# Patient Record
Sex: MALE | Race: BLACK OR AFRICAN AMERICAN | Employment: OTHER
[De-identification: names, ages, dates, MRNs, and addresses within clinical notes are randomized per-mention and may not be internally consistent; named-entity substitution may affect disease eponyms.]

---

## 2017-01-27 ENCOUNTER — TELEPHONE (OUTPATIENT)
Dept: FAMILY MEDICINE CLINIC | Facility: CLINIC | Age: 37
End: 2017-01-27

## 2017-06-01 ENCOUNTER — HOSPITAL ENCOUNTER (OUTPATIENT)
Age: 37
Setting detail: SPECIMEN
Discharge: HOME | End: 2017-06-01
Payer: MEDICARE

## 2017-06-01 ENCOUNTER — OFFICE VISIT (OUTPATIENT)
Dept: FAMILY MEDICINE CLINIC | Age: 37
End: 2017-06-01
Payer: MEDICARE

## 2017-06-01 VITALS
HEIGHT: 67 IN | DIASTOLIC BLOOD PRESSURE: 98 MMHG | BODY MASS INDEX: 45.01 KG/M2 | SYSTOLIC BLOOD PRESSURE: 147 MMHG | WEIGHT: 286.8 LBS | TEMPERATURE: 98.2 F | HEART RATE: 77 BPM

## 2017-06-01 DIAGNOSIS — E11.9 TYPE 2 DIABETES MELLITUS WITHOUT COMPLICATION, WITHOUT LONG-TERM CURRENT USE OF INSULIN (HCC): ICD-10-CM

## 2017-06-01 DIAGNOSIS — I10 ESSENTIAL HYPERTENSION: ICD-10-CM

## 2017-06-01 DIAGNOSIS — L98.9 SCALP LESION: Primary | ICD-10-CM

## 2017-06-01 LAB
CHOLESTEROL/HDL RATIO: 2.7
CHOLESTEROL: 150 MG/DL
CREATININE URINE POCT: 300
HBA1C MFR BLD: 8 %
HDLC SERPL-MCNC: 56 MG/DL
LDL CHOLESTEROL: 70 MG/DL (ref 0–130)
MICROALBUMIN/CREAT 24H UR: 80 MG/G{CREAT}
MICROALBUMIN/CREAT UR-RTO: ABNORMAL
TRIGL SERPL-MCNC: 122 MG/DL
VLDLC SERPL CALC-MCNC: NORMAL MG/DL (ref 1–30)

## 2017-06-01 PROCEDURE — G8427 DOCREV CUR MEDS BY ELIG CLIN: HCPCS | Performed by: FAMILY MEDICINE

## 2017-06-01 PROCEDURE — 1036F TOBACCO NON-USER: CPT | Performed by: FAMILY MEDICINE

## 2017-06-01 PROCEDURE — 99213 OFFICE O/P EST LOW 20 MIN: CPT | Performed by: FAMILY MEDICINE

## 2017-06-01 PROCEDURE — 82043 UR ALBUMIN QUANTITATIVE: CPT | Performed by: FAMILY MEDICINE

## 2017-06-01 PROCEDURE — 3045F PR MOST RECENT HEMOGLOBIN A1C LEVEL 7.0-9.0%: CPT | Performed by: FAMILY MEDICINE

## 2017-06-01 PROCEDURE — G8417 CALC BMI ABV UP PARAM F/U: HCPCS | Performed by: FAMILY MEDICINE

## 2017-06-01 PROCEDURE — 82044 UR ALBUMIN SEMIQUANTITATIVE: CPT | Performed by: FAMILY MEDICINE

## 2017-06-01 PROCEDURE — 83036 HEMOGLOBIN GLYCOSYLATED A1C: CPT | Performed by: FAMILY MEDICINE

## 2017-06-01 RX ORDER — FUROSEMIDE 20 MG/1
1 TABLET ORAL DAILY
COMMUNITY
Start: 2017-05-25 | End: 2017-06-01

## 2017-06-01 RX ORDER — LISINOPRIL AND HYDROCHLOROTHIAZIDE 12.5; 1 MG/1; MG/1
TABLET ORAL
Qty: 30 TABLET | Refills: 3 | Status: SHIPPED | OUTPATIENT
Start: 2017-06-01 | End: 2017-07-13 | Stop reason: DRUGHIGH

## 2017-06-01 RX ORDER — GLIMEPIRIDE 4 MG/1
TABLET ORAL
Qty: 30 TABLET | Refills: 2 | Status: SHIPPED | OUTPATIENT
Start: 2017-06-01 | End: 2017-07-13 | Stop reason: DRUGHIGH

## 2017-06-01 ASSESSMENT — ENCOUNTER SYMPTOMS
ROS SKIN COMMENTS: SCALP LESION
ABDOMINAL PAIN: 0
COLOR CHANGE: 0
SHORTNESS OF BREATH: 0

## 2017-07-13 ENCOUNTER — OFFICE VISIT (OUTPATIENT)
Dept: FAMILY MEDICINE CLINIC | Age: 37
End: 2017-07-13
Payer: MEDICARE

## 2017-07-13 VITALS
TEMPERATURE: 97.7 F | DIASTOLIC BLOOD PRESSURE: 100 MMHG | HEART RATE: 90 BPM | WEIGHT: 289.6 LBS | HEIGHT: 70 IN | SYSTOLIC BLOOD PRESSURE: 149 MMHG | BODY MASS INDEX: 41.46 KG/M2

## 2017-07-13 DIAGNOSIS — E11.9 TYPE 2 DIABETES MELLITUS WITHOUT COMPLICATION, WITHOUT LONG-TERM CURRENT USE OF INSULIN (HCC): Primary | ICD-10-CM

## 2017-07-13 DIAGNOSIS — L98.9 SCALP LESION: ICD-10-CM

## 2017-07-13 DIAGNOSIS — E66.01 MORBID OBESITY, UNSPECIFIED OBESITY TYPE (HCC): ICD-10-CM

## 2017-07-13 DIAGNOSIS — I10 ESSENTIAL HYPERTENSION: ICD-10-CM

## 2017-07-13 PROCEDURE — 99213 OFFICE O/P EST LOW 20 MIN: CPT | Performed by: FAMILY MEDICINE

## 2017-07-13 PROCEDURE — 99214 OFFICE O/P EST MOD 30 MIN: CPT

## 2017-07-13 PROCEDURE — G8427 DOCREV CUR MEDS BY ELIG CLIN: HCPCS | Performed by: FAMILY MEDICINE

## 2017-07-13 PROCEDURE — G8417 CALC BMI ABV UP PARAM F/U: HCPCS | Performed by: FAMILY MEDICINE

## 2017-07-13 PROCEDURE — 1036F TOBACCO NON-USER: CPT | Performed by: FAMILY MEDICINE

## 2017-07-13 PROCEDURE — 3046F HEMOGLOBIN A1C LEVEL >9.0%: CPT | Performed by: FAMILY MEDICINE

## 2017-07-13 RX ORDER — LISINOPRIL AND HYDROCHLOROTHIAZIDE 25; 20 MG/1; MG/1
1 TABLET ORAL DAILY
Qty: 30 TABLET | Refills: 3 | Status: SHIPPED | OUTPATIENT
Start: 2017-07-13 | End: 2018-05-11 | Stop reason: SDUPTHER

## 2017-07-13 RX ORDER — GLUCOSAMINE HCL/CHONDROITIN SU 500-400 MG
1 CAPSULE ORAL 3 TIMES DAILY
Qty: 30 STRIP | Refills: 3 | Status: SHIPPED | OUTPATIENT
Start: 2017-07-13 | End: 2017-11-20 | Stop reason: ALTCHOICE

## 2017-07-13 RX ORDER — BLOOD-GLUCOSE METER
KIT MISCELLANEOUS
Qty: 1 KIT | Refills: 0 | Status: SHIPPED | OUTPATIENT
Start: 2017-07-13 | End: 2017-11-20 | Stop reason: ALTCHOICE

## 2017-07-13 RX ORDER — LISINOPRIL AND HYDROCHLOROTHIAZIDE 20; 12.5 MG/1; MG/1
1 TABLET ORAL DAILY
Qty: 30 TABLET | Refills: 3 | Status: SHIPPED | OUTPATIENT
Start: 2017-07-13 | End: 2017-07-13 | Stop reason: DRUGHIGH

## 2017-07-13 RX ORDER — GLIMEPIRIDE 4 MG/1
8 TABLET ORAL EVERY MORNING
Qty: 30 TABLET | Refills: 3 | Status: SHIPPED | OUTPATIENT
Start: 2017-07-13 | End: 2018-05-11 | Stop reason: SDUPTHER

## 2017-07-13 ASSESSMENT — ENCOUNTER SYMPTOMS
ROS SKIN COMMENTS: SCALP LESION
VOMITING: 0
TROUBLE SWALLOWING: 0
ABDOMINAL PAIN: 0
DIARRHEA: 0
COUGH: 0
NAUSEA: 0
CONSTIPATION: 0
SHORTNESS OF BREATH: 0

## 2017-07-19 ENCOUNTER — INITIAL CONSULT (OUTPATIENT)
Dept: SURGERY | Age: 37
End: 2017-07-19
Payer: MEDICARE

## 2017-07-19 VITALS
HEIGHT: 70 IN | DIASTOLIC BLOOD PRESSURE: 86 MMHG | TEMPERATURE: 97.4 F | WEIGHT: 288.4 LBS | SYSTOLIC BLOOD PRESSURE: 133 MMHG | BODY MASS INDEX: 41.29 KG/M2 | HEART RATE: 81 BPM

## 2017-07-19 DIAGNOSIS — L98.9 SCALP LESION: Primary | ICD-10-CM

## 2017-07-19 PROCEDURE — 99214 OFFICE O/P EST MOD 30 MIN: CPT

## 2017-07-19 PROCEDURE — 99203 OFFICE O/P NEW LOW 30 MIN: CPT | Performed by: PODIATRIST

## 2017-08-02 ENCOUNTER — TELEPHONE (OUTPATIENT)
Dept: SURGERY | Age: 37
End: 2017-08-02

## 2017-08-07 ENCOUNTER — TELEPHONE (OUTPATIENT)
Dept: SURGERY | Age: 37
End: 2017-08-07

## 2017-08-09 ENCOUNTER — TELEPHONE (OUTPATIENT)
Dept: SURGERY | Age: 37
End: 2017-08-09

## 2017-08-15 ENCOUNTER — OFFICE VISIT (OUTPATIENT)
Dept: FAMILY MEDICINE CLINIC | Age: 37
End: 2017-08-15
Payer: MEDICARE

## 2017-08-15 VITALS
BODY MASS INDEX: 45.2 KG/M2 | HEIGHT: 67 IN | WEIGHT: 288 LBS | TEMPERATURE: 98 F | SYSTOLIC BLOOD PRESSURE: 132 MMHG | HEART RATE: 88 BPM | DIASTOLIC BLOOD PRESSURE: 74 MMHG

## 2017-08-15 DIAGNOSIS — E66.01 MORBID OBESITY, UNSPECIFIED OBESITY TYPE (HCC): ICD-10-CM

## 2017-08-15 DIAGNOSIS — E11.9 TYPE 2 DIABETES MELLITUS WITHOUT COMPLICATION, WITHOUT LONG-TERM CURRENT USE OF INSULIN (HCC): Primary | ICD-10-CM

## 2017-08-15 DIAGNOSIS — N52.9 ERECTILE DYSFUNCTION, UNSPECIFIED ERECTILE DYSFUNCTION TYPE: ICD-10-CM

## 2017-08-15 DIAGNOSIS — I10 ESSENTIAL HYPERTENSION: ICD-10-CM

## 2017-08-15 DIAGNOSIS — Z00.00 HEALTHCARE MAINTENANCE: ICD-10-CM

## 2017-08-15 PROCEDURE — 1036F TOBACCO NON-USER: CPT | Performed by: FAMILY MEDICINE

## 2017-08-15 PROCEDURE — 3046F HEMOGLOBIN A1C LEVEL >9.0%: CPT | Performed by: FAMILY MEDICINE

## 2017-08-15 PROCEDURE — 90715 TDAP VACCINE 7 YRS/> IM: CPT | Performed by: FAMILY MEDICINE

## 2017-08-15 PROCEDURE — G8427 DOCREV CUR MEDS BY ELIG CLIN: HCPCS | Performed by: FAMILY MEDICINE

## 2017-08-15 PROCEDURE — 99214 OFFICE O/P EST MOD 30 MIN: CPT | Performed by: FAMILY MEDICINE

## 2017-08-15 PROCEDURE — G8417 CALC BMI ABV UP PARAM F/U: HCPCS | Performed by: FAMILY MEDICINE

## 2017-08-15 PROCEDURE — 99213 OFFICE O/P EST LOW 20 MIN: CPT

## 2017-08-15 RX ORDER — SILDENAFIL 50 MG/1
50 TABLET, FILM COATED ORAL PRN
Qty: 10 TABLET | Refills: 0 | Status: SHIPPED | OUTPATIENT
Start: 2017-08-15 | End: 2017-11-20 | Stop reason: ALTCHOICE

## 2017-08-15 ASSESSMENT — ENCOUNTER SYMPTOMS
SHORTNESS OF BREATH: 0
COLOR CHANGE: 0
COUGH: 0
ABDOMINAL PAIN: 0
NAUSEA: 0
VOMITING: 0
CONSTIPATION: 0
DIARRHEA: 0
TROUBLE SWALLOWING: 0

## 2017-08-21 ENCOUNTER — ANESTHESIA EVENT (OUTPATIENT)
Dept: OPERATING ROOM | Age: 37
End: 2017-08-21
Payer: MEDICARE

## 2017-08-22 ENCOUNTER — ANESTHESIA (OUTPATIENT)
Dept: OPERATING ROOM | Age: 37
End: 2017-08-22
Payer: MEDICARE

## 2017-08-22 ENCOUNTER — HOSPITAL ENCOUNTER (OUTPATIENT)
Age: 37
Setting detail: OUTPATIENT SURGERY
Discharge: HOME | End: 2017-08-22
Attending: SURGERY | Admitting: SURGERY
Payer: MEDICARE

## 2017-08-22 VITALS
WEIGHT: 292.77 LBS | RESPIRATION RATE: 14 BRPM | SYSTOLIC BLOOD PRESSURE: 127 MMHG | DIASTOLIC BLOOD PRESSURE: 85 MMHG | OXYGEN SATURATION: 99 % | HEIGHT: 67 IN | TEMPERATURE: 97 F | HEART RATE: 80 BPM | BODY MASS INDEX: 45.95 KG/M2

## 2017-08-22 VITALS — SYSTOLIC BLOOD PRESSURE: 128 MMHG | OXYGEN SATURATION: 95 % | DIASTOLIC BLOOD PRESSURE: 93 MMHG

## 2017-08-22 LAB
GFR NON-AFRICAN AMERICAN: >60 ML/MIN
GFR SERPL CREATININE-BSD FRML MDRD: >60 ML/MIN
GFR SERPL CREATININE-BSD FRML MDRD: NORMAL ML/MIN/{1.73_M2}
GLUCOSE BLD-MCNC: 181 MG/DL (ref 75–110)
GLUCOSE BLD-MCNC: 254 MG/DL (ref 74–100)
POC CHLORIDE: 103 MMOL/L (ref 98–107)
POC CREATININE: 0.87 MG/DL (ref 0.51–1.19)
POC HEMATOCRIT: 49 % (ref 41–53)
POC HEMOGLOBIN: 16.6 G/DL (ref 13.5–17.5)
POC IONIZED CALCIUM: 1.17 MMOL/L (ref 1.15–1.33)
POC POTASSIUM: 4.6 MMOL/L (ref 3.5–4.5)
POC SODIUM: 140 MMOL/L (ref 138–146)

## 2017-08-22 PROCEDURE — 85014 HEMATOCRIT: CPT

## 2017-08-22 PROCEDURE — 84132 ASSAY OF SERUM POTASSIUM: CPT

## 2017-08-22 PROCEDURE — 3700000000 HC ANESTHESIA ATTENDED CARE: Performed by: SURGERY

## 2017-08-22 PROCEDURE — 2580000003 HC RX 258: Performed by: ANESTHESIOLOGY

## 2017-08-22 PROCEDURE — 7100000010 HC PHASE II RECOVERY - FIRST 15 MIN: Performed by: SURGERY

## 2017-08-22 PROCEDURE — 2500000003 HC RX 250 WO HCPCS: Performed by: ANESTHESIOLOGY

## 2017-08-22 PROCEDURE — 3700000001 HC ADD 15 MINUTES (ANESTHESIA): Performed by: SURGERY

## 2017-08-22 PROCEDURE — 3600000002 HC SURGERY LEVEL 2 BASE: Performed by: SURGERY

## 2017-08-22 PROCEDURE — A4364 ADHESIVE, LIQUID OR EQUAL: HCPCS | Performed by: SURGERY

## 2017-08-22 PROCEDURE — 82435 ASSAY OF BLOOD CHLORIDE: CPT

## 2017-08-22 PROCEDURE — 93005 ELECTROCARDIOGRAM TRACING: CPT

## 2017-08-22 PROCEDURE — 2500000003 HC RX 250 WO HCPCS: Performed by: NURSE ANESTHETIST, CERTIFIED REGISTERED

## 2017-08-22 PROCEDURE — 3600000012 HC SURGERY LEVEL 2 ADDTL 15MIN: Performed by: SURGERY

## 2017-08-22 PROCEDURE — 82947 ASSAY GLUCOSE BLOOD QUANT: CPT

## 2017-08-22 PROCEDURE — 82330 ASSAY OF CALCIUM: CPT

## 2017-08-22 PROCEDURE — 2580000003 HC RX 258: Performed by: SURGERY

## 2017-08-22 PROCEDURE — 82565 ASSAY OF CREATININE: CPT

## 2017-08-22 PROCEDURE — 2500000003 HC RX 250 WO HCPCS: Performed by: SURGERY

## 2017-08-22 PROCEDURE — 84295 ASSAY OF SERUM SODIUM: CPT

## 2017-08-22 PROCEDURE — 7100000011 HC PHASE II RECOVERY - ADDTL 15 MIN: Performed by: SURGERY

## 2017-08-22 PROCEDURE — 88305 TISSUE EXAM BY PATHOLOGIST: CPT

## 2017-08-22 PROCEDURE — 6360000002 HC RX W HCPCS: Performed by: NURSE ANESTHETIST, CERTIFIED REGISTERED

## 2017-08-22 RX ORDER — SODIUM CHLORIDE 0.9 % (FLUSH) 0.9 %
10 SYRINGE (ML) INJECTION PRN
Status: DISCONTINUED | OUTPATIENT
Start: 2017-08-22 | End: 2017-08-22 | Stop reason: HOSPADM

## 2017-08-22 RX ORDER — DIPHENHYDRAMINE HYDROCHLORIDE 50 MG/ML
12.5 INJECTION INTRAMUSCULAR; INTRAVENOUS
Status: DISCONTINUED | OUTPATIENT
Start: 2017-08-22 | End: 2017-08-22 | Stop reason: HOSPADM

## 2017-08-22 RX ORDER — SODIUM CHLORIDE, SODIUM LACTATE, POTASSIUM CHLORIDE, CALCIUM CHLORIDE 600; 310; 30; 20 MG/100ML; MG/100ML; MG/100ML; MG/100ML
INJECTION, SOLUTION INTRAVENOUS CONTINUOUS
Status: DISCONTINUED | OUTPATIENT
Start: 2017-08-22 | End: 2017-08-22 | Stop reason: HOSPADM

## 2017-08-22 RX ORDER — SODIUM CHLORIDE 0.9 % (FLUSH) 0.9 %
10 SYRINGE (ML) INJECTION EVERY 12 HOURS SCHEDULED
Status: DISCONTINUED | OUTPATIENT
Start: 2017-08-22 | End: 2017-08-22 | Stop reason: HOSPADM

## 2017-08-22 RX ORDER — LIDOCAINE HYDROCHLORIDE 10 MG/ML
INJECTION, SOLUTION EPIDURAL; INFILTRATION; INTRACAUDAL; PERINEURAL PRN
Status: DISCONTINUED | OUTPATIENT
Start: 2017-08-22 | End: 2017-08-22 | Stop reason: SDUPTHER

## 2017-08-22 RX ORDER — BUPIVACAINE HYDROCHLORIDE 2.5 MG/ML
INJECTION, SOLUTION EPIDURAL; INFILTRATION; INTRACAUDAL PRN
Status: DISCONTINUED | OUTPATIENT
Start: 2017-08-22 | End: 2017-08-22 | Stop reason: HOSPADM

## 2017-08-22 RX ORDER — PROMETHAZINE HYDROCHLORIDE 25 MG/ML
6.25 INJECTION, SOLUTION INTRAMUSCULAR; INTRAVENOUS
Status: DISCONTINUED | OUTPATIENT
Start: 2017-08-22 | End: 2017-08-22 | Stop reason: HOSPADM

## 2017-08-22 RX ORDER — IBUPROFEN 800 MG/1
800 TABLET ORAL EVERY 6 HOURS PRN
Qty: 40 TABLET | Refills: 0 | Status: SHIPPED | OUTPATIENT
Start: 2017-08-22 | End: 2017-11-20 | Stop reason: SDUPTHER

## 2017-08-22 RX ORDER — PROPOFOL 10 MG/ML
INJECTION, EMULSION INTRAVENOUS PRN
Status: DISCONTINUED | OUTPATIENT
Start: 2017-08-22 | End: 2017-08-22 | Stop reason: SDUPTHER

## 2017-08-22 RX ORDER — MAGNESIUM HYDROXIDE 1200 MG/15ML
LIQUID ORAL CONTINUOUS PRN
Status: DISCONTINUED | OUTPATIENT
Start: 2017-08-22 | End: 2017-08-22 | Stop reason: HOSPADM

## 2017-08-22 RX ORDER — ONDANSETRON 2 MG/ML
4 INJECTION INTRAMUSCULAR; INTRAVENOUS
Status: DISCONTINUED | OUTPATIENT
Start: 2017-08-22 | End: 2017-08-22 | Stop reason: HOSPADM

## 2017-08-22 RX ORDER — FENTANYL CITRATE 50 UG/ML
INJECTION, SOLUTION INTRAMUSCULAR; INTRAVENOUS PRN
Status: DISCONTINUED | OUTPATIENT
Start: 2017-08-22 | End: 2017-08-22 | Stop reason: SDUPTHER

## 2017-08-22 RX ORDER — MIDAZOLAM HYDROCHLORIDE 1 MG/ML
INJECTION INTRAMUSCULAR; INTRAVENOUS PRN
Status: DISCONTINUED | OUTPATIENT
Start: 2017-08-22 | End: 2017-08-22 | Stop reason: SDUPTHER

## 2017-08-22 RX ORDER — LIDOCAINE HYDROCHLORIDE 10 MG/ML
1 INJECTION, SOLUTION EPIDURAL; INFILTRATION; INTRACAUDAL; PERINEURAL
Status: COMPLETED | OUTPATIENT
Start: 2017-08-22 | End: 2017-08-22

## 2017-08-22 RX ADMIN — PROPOFOL 20 MG: 10 INJECTION, EMULSION INTRAVENOUS at 13:32

## 2017-08-22 RX ADMIN — PROPOFOL 20 MG: 10 INJECTION, EMULSION INTRAVENOUS at 13:41

## 2017-08-22 RX ADMIN — MIDAZOLAM HYDROCHLORIDE 2 MG: 1 INJECTION, SOLUTION INTRAMUSCULAR; INTRAVENOUS at 13:15

## 2017-08-22 RX ADMIN — SODIUM CHLORIDE, POTASSIUM CHLORIDE, SODIUM LACTATE AND CALCIUM CHLORIDE: 600; 310; 30; 20 INJECTION, SOLUTION INTRAVENOUS at 11:55

## 2017-08-22 RX ADMIN — LIDOCAINE HYDROCHLORIDE 0.4 ML: 10 INJECTION, SOLUTION INFILTRATION; PERINEURAL at 11:44

## 2017-08-22 RX ADMIN — LIDOCAINE HYDROCHLORIDE 50 MG: 10 INJECTION, SOLUTION EPIDURAL; INFILTRATION; INTRACAUDAL; PERINEURAL at 13:25

## 2017-08-22 RX ADMIN — PROPOFOL 20 MG: 10 INJECTION, EMULSION INTRAVENOUS at 13:55

## 2017-08-22 RX ADMIN — FENTANYL CITRATE 50 MCG: 50 INJECTION INTRAMUSCULAR; INTRAVENOUS at 13:27

## 2017-08-22 RX ADMIN — PROPOFOL 20 MG: 10 INJECTION, EMULSION INTRAVENOUS at 13:34

## 2017-08-22 RX ADMIN — PROPOFOL 20 MG: 10 INJECTION, EMULSION INTRAVENOUS at 13:30

## 2017-08-22 RX ADMIN — PROPOFOL 20 MG: 10 INJECTION, EMULSION INTRAVENOUS at 14:00

## 2017-08-22 ASSESSMENT — PAIN SCALES - GENERAL
PAINLEVEL_OUTOF10: 0

## 2017-08-22 ASSESSMENT — PAIN - FUNCTIONAL ASSESSMENT: PAIN_FUNCTIONAL_ASSESSMENT: 0-10

## 2017-08-22 NOTE — BRIEF OP NOTE
Brief Postoperative Note  ______________________________________________________________    Patient: Geovany Iyer  YOB: 1980  MRN: [id number]  Date of Procedure: 8/22/2017    Pre-Op Diagnosis: RECURRENT SCALP LESION RIGHT TEMPORAL AREA    Post-Op Diagnosis: Same       Procedure(s):  EXCISION SKIN LESION SCALP RIGHT TEMPORAL AREA    Anesthesia: Monitor Anesthesia Care    Surgeon(s):  Shila Mott DO    Resident  Clem mcdonald'Caio PGY1    Staff:  Scrub Person First: Dimas Osorio     Estimated Blood Loss: less than 5ml    Complications: None    Specimens:     ID Type Source Tests Collected by Time Destination   A : RIGHT SCALP LESION Tissue Head [address], DO 8/22/2017 1342        Implants:  * No implants in log *      Drains:           Findings: surgical excision of right temporal scalp lesion, The lesion was apporxiately 5mm in height and 2mm in width on a stalk, layered closure with 4-0 Monocryl deep dermal, 4-0 Monocryl subcuticular and 5-0 simple interrupted nylon    Clem Villalobos DO  Date: 8/22/2017  Time: 2:26 PM

## 2017-08-22 NOTE — H&P
History and Physical        PATIENT NAME: Annie Kimbrough OF BIRTH: 1980      TODAY'S DATE: 8/30/17     CHIEF COMPLAINT:  Mole on my head.      HISTORY OF PRESENT ILLNESS:  This is a 40 y.o. 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 Pt states that he had the same lesion removed in 2015 and it came back approximately 9 months later. Patient states that it is non-painful. Pt states that occasionally he picks at it and it will bleed. Pt denies any purulent drainage. Pt states that \"he would like the lesion removed ASAP\".  Pt denies any N/V/F/C.      Past Medical History:     Past Medical History              Diagnosis Date    Diabetes mellitus ([facility]      Hypertension      Morbid obesity ([facility] 4/28/2016    Type 2 diabetes mellitus without complication ([facility] 7/88/6197            Past Surgical History:     Past Surgical History              Procedure Laterality Date    TONSILLECTOMY                Medications:     Current Medication       Current Outpatient Prescriptions:     glimepiride (AMARYL) 4 MG tablet, Take 2 tablets by mouth every morning, Disp: 30 tablet, Rfl: 3    SOFT TOUCH LANCETS MISC, Twice daily, Disp: 100 each, Rfl: 3    Glucose Blood (BLOOD GLUCOSE TEST STRIPS) STRP, 1 each by In Vitro route 3 times daily, Disp: 30 strip, Rfl: 3    glucose monitoring kit (FREESTYLE) monitoring kit, Check blood sugar three times daily, Disp: 1 kit, Rfl: 0    lisinopril-hydrochlorothiazide (PRINZIDE;ZESTORETIC) 20-25 MG per tablet, Take 1 tablet by mouth daily, Disp: 30 tablet, Rfl: 3    SITagliptin (JANUVIA) 100 MG tablet, take 1 tablet by mouth once daily, Disp: 30 tablet, Rfl: 3    RA ASPIRIN EC 81 MG EC tablet, take 1 tablet by mouth once daily, Disp: 30 tablet, Rfl: 3    ibuprofen (ADVIL;MOTRIN) 800 MG tablet, Take 1 tablet by mouth every 8 hours as needed for Pain, Disp: 30 tablet, Rfl: 0    acetaminophen-codeine

## 2017-08-22 NOTE — IP AVS SNAPSHOT
After Visit Summary  (Discharge Instructions)    Medication List for Home    Based on the information you provided to us as well as any changes during this visit, the following is your updated medication list.  Compare this with your prescription bottles at home. If you have any questions or concerns, contact your primary care physician's office. Daily Medication List (This medication list can be shared with any healthcare provider who is helping you manage your medications)      There are NEW medications for you. START taking them after you leave the hospital        Last Dose    Next Dose Due AM NOON PM NIGHT    ibuprofen 800 MG tablet   Commonly known as:  ADVIL   Take 1 tablet by mouth every 6 hours as needed for Pain                                           You told us you were taking these medications at home, but the amount or how often you take this medication has CHANGED        Last Dose    Next Dose Due AM NOON PM NIGHT    RA ASPIRIN EC 81 MG EC tablet   Generic drug:  aspirin   take 1 tablet by mouth once daily   What changed:  See the new instructions.                                            These are medications you told us you were taking at home, CONTINUE taking them after you leave the hospital        Last Dose    Next Dose Due AM NOON PM NIGHT    BLOOD GLUCOSE TEST STRIPS Strp   1 each by In Vitro route 3 times daily                                         glimepiride 4 MG tablet   Commonly known as:  AMARYL   Take 2 tablets by mouth every morning                                         Lancets Misc   Use once daily                                         SOFT TOUCH LANCETS Misc   Twice daily                                         LDR BLOOD GLUCOSE TRUETEST w/Device Kit   Use once daily                                         glucose monitoring kit monitoring kit   Check blood sugar three times daily lisinopril-hydrochlorothiazide 20-25 MG per tablet   Commonly known as:  PRINZIDE;ZESTORETIC   Take 1 tablet by mouth daily                                         sildenafil 50 MG tablet   Commonly known as:  VIAGRA   Take 1 tablet by mouth as needed for Erectile Dysfunction                                         SITagliptin 100 MG tablet   Commonly known as:  Per Soradha   take 1 tablet by mouth once daily                                              Where to Get Your Medications      You can get these medications from any pharmacy     Bring a paper prescription for each of these medications     ibuprofen 800 MG tablet               Allergies as of 2017     No Known Allergies      Immunizations as of 2017     Name Date Dose VIS Date Route    Pneumococcal Polysaccharide (Avtpcyggx54) 2016 0.5 mL 2015 Intramuscular    Tdap (Boostrix, Adacel) 8/15/2017 0.5 mL 2015 Intramuscular      Last Vitals          Most Recent Value    Temp  97 °F (36.1 °C)    Pulse  97    Resp  16    BP  129/83         After Visit Summary    This summary was created for you. Thank you for entrusting your care to us. The following information includes details about your hospital/visit stay along with steps you should take to help with your recovery once you leave the hospital.  In this packet, you will find information about the topics listed below:    · Instructions about your medications including a list of your home medications  · A summary of your hospital visit  · Follow-up appointments once you have left the hospital  · Your care plan at home      You may receive a survey regarding the care you received during your stay. Your input is valuable to us. We encourage you to complete and return your survey in the envelope provided. We hope you will choose us in the future for your healthcare needs.           Patient Information     Patient Name MARY Fisher 1980      Care Provided at: Name Address Phone       [address] [place] [address] [facility] [phone]            Your Visit    Here you will find information about your visit, including the reason for your visit. Please take this sheet with you when you visit your doctor or other health care provider in the future. It will help determine the best possible medical care for you at that time. If you have any questions once you leave the hospital, please call the department phone number listed below. Why you were here     Your primary diagnosis was:  Not on File      Visit Information     Date & Time Provider Department Dept. Phone    [facility] [address] [phone]       Follow-up Appointments    Below is a list of your follow-up and future appointments. This may not be a complete list as you may have made appointments directly with providers that we are not aware of or your providers may have made some for you. Please call your providers to confirm appointments. It is important to keep your appointments. Please bring your current insurance card, photo ID, co-pay, and all medication bottles to your appointment. If self-pay, payment is expected at the time of service. Follow-up Information     Follow up with Isis Antunez DO In 10 days.     Specialty:  General Surgery    Contact information:    [place] [phone]        Future Appointments     9/6/2017 9:00 AM     Appointment with TRACY Lundy[address] at [facility] ([phone])   [place] [phone]         Preventive Care        Date Due    Yearly Flu Vaccine (1) 11/15/2017 (Originally 8/1/2017)    Diabetic Foot Exam 12/15/2017 (Originally 5/26/2017)    Eye Exam By An Eye Doctor 2/15/2018 (Originally 5/26/2017)    Hemoglobin A1C (Test For Long-Term Glucose Control) 6/1/2018    Urine Check For Kidney Problems 6/1/2018 Cholesterol Screening 6/1/2018    Tetanus Combination Vaccine (2 - Td) 8/15/2027                 Care Plan Once You Return Home    This section includes instructions you will need to follow once you leave the hospital.  Your care team will discuss these with you, so you and those caring for you know how to best care for your health needs at home. This section may also include educational information about certain health topics that may be of help to you. Discharge Instructions       Patient Discharge Instructions  Discharge Date:  8/22/2017    HYGEINE: Manolo Hernan to shower, no soaking in a tub/pool until seen at your follow up appointment. Leave Skin glue on, do not peel off, let fall off on its own. Sutures to be removed in 10 days at office visit    DRIVING: No driving while taking narcotic medications or while in pain    ACTIVITY: activity as tolerated     DIET: Resume your normal diet as advised by your PCP. MEDICATIONS: Take all medications as prescribed. SPECIAL INSTRUCTIONS:     Follow up with Dr. Erik Mclain in 10-14 days, call the clinic for appointment. Call sooner if fever above 101.4 degrees Celsius, increase in swelling or redness, thick purulent discharge or pain not controlled with medications. No alcoholic beverages, no driving or operating machinery, no making important decisions for 24 hours. Children should maintain quiet play ( games, movies, books ) for 24 hours. You may have a normal diet but should eat lightly day of surgery. Drink plenty of fluids. Urinate within 8 hours after surgery, if unable to urinate call your doctor        Sumaya Davis allows you to send messages to your doctor, view your test results, renew your prescriptions, schedule appointments, view visit notes, and more. How Do I Sign Up? 1. In your Internet browser, go to [email or web address]  2. Click on the Sign Up Now link in the Sign In box.  You will see the New Member Sign Up page. 3. Enter your Yodle Access Code exactly as it appears below. You will not need to use this code after youve completed the sign-up process. If you do not sign up before the expiration date, you must request a new code. Yodle Access Code: [email or web address]  Expires: 10/14/2017  9:48 AM    4. Enter your Social Security Number ([id number]) and Date of Birth (mm/dd/yyyy) as indicated and click Submit. You will be taken to the next sign-up page. 5. Create a Platypus TVt ID. This will be your Yodle login ID and cannot be changed, so think of one that is secure and easy to remember. 6. Create a Yodle password. You can change your password at any time. 7. Enter your Password Reset Question and Answer. This can be used at a later time if you forget your password. 8. Enter your e-mail address. You will receive e-mail notification when new information is available in [address] 9. Click Sign Up. You can now view your medical record. Additional Information  If you have questions, please contact the physician practice where you receive care. Remember, [employer]le is NOT to be used for urgent needs. For medical emergencies, dial 911. For questions regarding your Yodle account call [phone]. If you have a clinical question, please call your doctor's office. View your information online  ? Review your current list of  medications, immunization, and allergies. ? Review your future test results online . ? Review your discharge instructions provided by your caregivers at discharge    Certain functionality such as prescription refills, scheduling appointments or sending messages to your provider are not activated if your provider does not use [employer] in his/her office    For questions regarding your Yodle account call [phone]. If you have a clinical question, please call your doctor's office.          The information on all pages of the After Visit Summary has been reviewed with me, the patient and/or responsible adult, by my health care provider(s). I had the opportunity to ask questions regarding this information. I understand I should dispose of my armband safely at home to protect my health information. A complete copy of the After Visit Summary has been given to me, the patient and/or responsible adult.            Patient Signature/Responsible Adult:____________________    Clinician Signature:_____________________    Date:_____________________    Time:_____________________

## 2017-08-22 NOTE — IP AVS SNAPSHOT
Patient Information     Patient Name MARY [facility] 1980         This is your updated medication list to keep with you all times      TAKE these medications     BLOOD GLUCOSE TEST STRIPS Strp   1 each by In Vitro route 3 times daily       glimepiride 4 MG tablet   Commonly known as:  AMARYL   Take 2 tablets by mouth every morning       ibuprofen 800 MG tablet   Commonly known as:  ADVIL   Take 1 tablet by mouth every 6 hours as needed for Pain       * Lancets Misc   Use once daily       * SOFT TOUCH LANCETS Misc   Twice daily       * LDR BLOOD GLUCOSE TRUETEST w/Device Kit   Use once daily       * glucose monitoring kit monitoring kit   Check blood sugar three times daily       lisinopril-hydrochlorothiazide 20-25 MG per tablet   Commonly known as:  PRINZIDE;ZESTORETIC   Take 1 tablet by mouth daily       RA ASPIRIN EC 81 MG EC tablet   Generic drug:  aspirin   take 1 tablet by mouth once daily       sildenafil 50 MG tablet   Commonly known as:  VIAGRA   Take 1 tablet by mouth as needed for Erectile Dysfunction       SITagliptin 100 MG tablet   Commonly known as:  JANUVIA   take 1 tablet by mouth once daily       * Notice: This list has 4 medication(s) that are the same as other medications prescribed for you. Read the directions carefully, and ask your doctor or other care provider to review them with you.

## 2017-08-24 LAB — DERMATOLOGY PATHOLOGY REPORT: NORMAL

## 2017-08-24 NOTE — OP NOTE
[address] [facility]                               OPERATIVE REPORT    PATIENT NAME: Altagracia Steen                              :       1980  MED REC NO:   [id number]                                        ROOM:  ACCOUNT NO:   [de-identified]                                      ADMISSION  DATE:  2017  PROVIDER:     Juan Kelley DO    DATE OF PROCEDURE:  2017    PREOPERATIVE DIAGNOSIS:  Scalp lesion right temporal area. POSTOPERATIVE DIAGNOSIS:  Scalp lesion right temporal area. OPERATION PERFORMED:  Excision, skin lesion scalp. SURGEON:  Oumar Zurita DO    ASSISTANT:  Juan Kelley DO    ANESTHESIA:  MAC    EBL:  Less than 5 mL. COMPLICATIONS:  None. WOUND CLASS:  I    SPECIMEN:  Collected and sent for pathology. 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 The patient  states that he had the same lesion removed in    in the office by his PCP and it came back  approximately nine months later. The patient states that this is not  painful. The patient states he occasionally picks at it and it will  bleed. The patient denies any purulent drainage. The patient states  that he would like to have the lesion removed ASAP. The patient  denies any nausea, vomiting, fevers, or chills. Surgical consent was  obtained. The patient was given time and all questions were answered. The patient agreed to proceed with surgery and consent was signed. OPERATIVE PROCEDURE:  The patient was wheeled back to the OR suite,  placed in supine position. Proper telemetry was applied. Proper SCDs  were applied. MAC anesthesia was induced. A timeout was completed. The proper patient, procedure to be performed, and date of birth was  confirmed. All in the OR were in agreement.

## 2017-08-25 LAB
EKG ATRIAL RATE: 95 BPM
EKG P AXIS: 51 DEGREES
EKG P-R INTERVAL: 168 MS
EKG Q-T INTERVAL: 338 MS
EKG QRS DURATION: 86 MS
EKG QTC CALCULATION (BAZETT): 424 MS
EKG R AXIS: 29 DEGREES
EKG T AXIS: 19 DEGREES
EKG VENTRICULAR RATE: 95 BPM

## 2017-09-06 ENCOUNTER — OFFICE VISIT (OUTPATIENT)
Dept: SURGERY | Age: 37
End: 2017-09-06
Payer: MEDICARE

## 2017-09-06 VITALS
DIASTOLIC BLOOD PRESSURE: 102 MMHG | BODY MASS INDEX: 46.99 KG/M2 | SYSTOLIC BLOOD PRESSURE: 144 MMHG | WEIGHT: 300 LBS | HEART RATE: 75 BPM | TEMPERATURE: 98.1 F

## 2017-09-06 DIAGNOSIS — D23.9 ECCRINE POROMA: ICD-10-CM

## 2017-09-06 DIAGNOSIS — L98.9 SCALP LESION: Primary | ICD-10-CM

## 2017-09-06 PROCEDURE — 99024 POSTOP FOLLOW-UP VISIT: CPT | Performed by: STUDENT IN AN ORGANIZED HEALTH CARE EDUCATION/TRAINING PROGRAM

## 2017-09-06 PROCEDURE — 99213 OFFICE O/P EST LOW 20 MIN: CPT

## 2017-10-04 DIAGNOSIS — E11.9 TYPE 2 DIABETES MELLITUS WITHOUT COMPLICATION (HCC): ICD-10-CM

## 2017-10-04 RX ORDER — ACETAMINOPHEN/DIPHENHYDRAMINE 500MG-25MG
TABLET ORAL
Qty: 30 TABLET | Refills: 3 | OUTPATIENT
Start: 2017-10-04

## 2017-10-04 NOTE — TELEPHONE ENCOUNTER
Medication is on med list please review and address    Please address the medication refill and close the encounter. If I can be of assistance, please route to the applicable pool. Thank you. Health Maintenance   Topic Date Due    Flu vaccine (1) 11/15/2017 (Originally 9/1/2017)    Diabetic foot exam  12/15/2017 (Originally 5/26/2017)    Diabetic retinal exam  02/15/2018 (Originally 5/26/2017)    Diabetic hemoglobin A1C test  06/01/2018    Diabetic microalbuminuria test  06/01/2018    Lipid screen  06/01/2018    DTaP/Tdap/Td vaccine (2 - Td) 08/15/2027    Pneumococcal med risk  Completed    HIV screen  Completed       Hemoglobin A1C (%)   Date Value   06/01/2017 8.0   04/28/2016 10.2   10/09/2015 7.5             ( goal A1C is < 7)   Microalb/Crt. Ratio (mcg/mg creat)   Date Value   04/28/2016 36 (H)     LDL Cholesterol (mg/dL)   Date Value   06/01/2017 70       (goal LDL is <100)   AST (U/L)   Date Value   04/28/2016 21     ALT (U/L)   Date Value   04/28/2016 36     BUN (mg/dL)   Date Value   04/28/2016 6     BP Readings from Last 3 Encounters:   09/06/17 (!) 144/102   08/22/17 127/85   08/22/17 (!) 128/93          (goal 120/80)    All Future Testing planned in [facility]      Next Visit Date:  No future appointments.          Patient Active Problem List:     Type 2 diabetes mellitus without complication ([address]     Essential hypertension     Morbid obesity ([address]     Scalp lesion     Healthcare maintenance     Erectile dysfunction

## 2017-10-16 ENCOUNTER — TELEPHONE (OUTPATIENT)
Dept: FAMILY MEDICINE CLINIC | Age: 37
End: 2017-10-16

## 2017-10-16 NOTE — TELEPHONE ENCOUNTER
Patient called stating that he is required to give a urine sample every week. He said they are telling him that his urine shows positive for alcohol but he does not drink or smoke. Patient wants to know if there are any ingredients in any of his medications that would make his urine positive for traces of alcohol?

## 2017-10-16 NOTE — TELEPHONE ENCOUNTER
Called the pt and explained that urine drug screen is not very sensitive or specific test. False positive results are common. If necessary blood test or hair test is more accurate than UDS. Pt voiced understanding. No further questions.     Thank you    Dr Patricia Ryan

## 2017-11-20 ENCOUNTER — OFFICE VISIT (OUTPATIENT)
Dept: FAMILY MEDICINE CLINIC | Age: 37
End: 2017-11-20
Payer: MEDICARE

## 2017-11-20 VITALS
WEIGHT: 297.8 LBS | TEMPERATURE: 98.2 F | HEART RATE: 99 BPM | BODY MASS INDEX: 46.74 KG/M2 | HEIGHT: 67 IN | SYSTOLIC BLOOD PRESSURE: 138 MMHG | DIASTOLIC BLOOD PRESSURE: 90 MMHG

## 2017-11-20 DIAGNOSIS — E11.9 TYPE 2 DIABETES MELLITUS WITHOUT COMPLICATION, WITHOUT LONG-TERM CURRENT USE OF INSULIN (HCC): Primary | ICD-10-CM

## 2017-11-20 DIAGNOSIS — Z23 NEED FOR INFLUENZA VACCINATION: ICD-10-CM

## 2017-11-20 DIAGNOSIS — K08.89 PAIN, DENTAL: ICD-10-CM

## 2017-11-20 DIAGNOSIS — I10 ESSENTIAL HYPERTENSION: ICD-10-CM

## 2017-11-20 LAB — HBA1C MFR BLD: 8.9 %

## 2017-11-20 PROCEDURE — 83036 HEMOGLOBIN GLYCOSYLATED A1C: CPT | Performed by: FAMILY MEDICINE

## 2017-11-20 PROCEDURE — 99213 OFFICE O/P EST LOW 20 MIN: CPT | Performed by: FAMILY MEDICINE

## 2017-11-20 PROCEDURE — G8484 FLU IMMUNIZE NO ADMIN: HCPCS | Performed by: FAMILY MEDICINE

## 2017-11-20 PROCEDURE — G8417 CALC BMI ABV UP PARAM F/U: HCPCS | Performed by: FAMILY MEDICINE

## 2017-11-20 PROCEDURE — 90688 IIV4 VACCINE SPLT 0.5 ML IM: CPT

## 2017-11-20 PROCEDURE — G8427 DOCREV CUR MEDS BY ELIG CLIN: HCPCS | Performed by: FAMILY MEDICINE

## 2017-11-20 PROCEDURE — 1036F TOBACCO NON-USER: CPT | Performed by: FAMILY MEDICINE

## 2017-11-20 PROCEDURE — 3045F PR MOST RECENT HEMOGLOBIN A1C LEVEL 7.0-9.0%: CPT | Performed by: FAMILY MEDICINE

## 2017-11-20 PROCEDURE — 99214 OFFICE O/P EST MOD 30 MIN: CPT

## 2017-11-20 PROCEDURE — G0008 ADMIN INFLUENZA VIRUS VAC: HCPCS | Performed by: FAMILY MEDICINE

## 2017-11-20 RX ORDER — GLUCOSAMINE HCL/CHONDROITIN SU 500-400 MG
1 CAPSULE ORAL 3 TIMES DAILY
Qty: 30 STRIP | Refills: 3 | Status: SHIPPED | OUTPATIENT
Start: 2017-11-20 | End: 2018-01-15 | Stop reason: SDUPTHER

## 2017-11-20 RX ORDER — METFORMIN HYDROCHLORIDE 500 MG/1
500 TABLET, EXTENDED RELEASE ORAL
Qty: 30 TABLET | Refills: 3 | Status: SHIPPED | OUTPATIENT
Start: 2017-11-20 | End: 2018-03-26 | Stop reason: SDUPTHER

## 2017-11-20 RX ORDER — IBUPROFEN 800 MG/1
800 TABLET ORAL EVERY 6 HOURS PRN
Qty: 40 TABLET | Refills: 2 | Status: SHIPPED | OUTPATIENT
Start: 2017-11-20 | End: 2018-03-24 | Stop reason: SDUPTHER

## 2017-11-20 RX ORDER — AMOXICILLIN 500 MG/1
500 CAPSULE ORAL 3 TIMES DAILY
Qty: 30 CAPSULE | Refills: 0 | Status: SHIPPED | OUTPATIENT
Start: 2017-11-20 | End: 2017-11-30

## 2017-11-20 RX ORDER — BLOOD-GLUCOSE METER
KIT MISCELLANEOUS
Qty: 1 KIT | Refills: 0 | Status: SHIPPED | OUTPATIENT
Start: 2017-11-20 | End: 2018-01-15 | Stop reason: SDUPTHER

## 2017-11-20 RX ORDER — UBIQUINOL 100 MG
1 CAPSULE ORAL 3 TIMES DAILY
Qty: 100 EACH | Refills: 1 | Status: SHIPPED | OUTPATIENT
Start: 2017-11-20 | End: 2018-01-15 | Stop reason: SDUPTHER

## 2017-11-20 ASSESSMENT — ENCOUNTER SYMPTOMS
DIARRHEA: 0
VOMITING: 0
NAUSEA: 0
CONSTIPATION: 0
COUGH: 0
SHORTNESS OF BREATH: 0
ABDOMINAL PAIN: 0
COLOR CHANGE: 0

## 2017-11-20 ASSESSMENT — PATIENT HEALTH QUESTIONNAIRE - PHQ9
SUM OF ALL RESPONSES TO PHQ QUESTIONS 1-9: 0
1. LITTLE INTEREST OR PLEASURE IN DOING THINGS: 0
SUM OF ALL RESPONSES TO PHQ9 QUESTIONS 1 & 2: 0
2. FEELING DOWN, DEPRESSED OR HOPELESS: 0

## 2017-11-20 NOTE — PROGRESS NOTES
Subjective:      Asiya Goncalves is a 40 y.o. male with Hx of  has a past medical history of Diabetes mellitus ([facility] Erectile dysfunction; Hypertension; Morbid obesity ([facility] and Type 2 diabetes mellitus without complication ([facility] Presented to the office today for:     HPI    Diabetes hypertension follow-up  No home monitoring due to high co-pay for diabetes supplies. Last A1c was 8.0 June 2017. Reports polyuria. Patient is on glyburide 4 mg every morning and Januvia daily. Elevated blood pressure today. Patient relates the high blood pressure due to dental pain. Has been having dental pain for about 2 weeks. Scheduled an appointment with the dentist and the soonest available date was in 4 weeks. No compliance issues. Denies headaches or vision changes chest pain or lower extremity edema      Review of Systems   Constitutional: Negative for chills and fever. HENT: Positive for dental problem. Eyes: Negative for visual disturbance. Respiratory: Negative for cough and shortness of breath. Cardiovascular: Negative for chest pain, palpitations and leg swelling. Gastrointestinal: Negative for abdominal pain, constipation, diarrhea, nausea and vomiting. Genitourinary: Negative for difficulty urinating. Musculoskeletal: Negative for arthralgias and myalgias. Skin: Negative for color change, pallor, rash and wound. Neurological: Negative for light-headedness and headaches. Psychiatric/Behavioral: Negative for behavioral problems. Family History   Problem Relation Age of Onset    Diabetes Mother     High Cholesterol Mother     High Blood Pressure Mother     Heart Disease Mother        Social History     Social History    Marital status: Single     Spouse name: N/A    Number of children: N/A    Years of education: N/A     Occupational History    Not on file.      Social History Main Topics    Smoking status: Never Smoker    Smokeless tobacco: Never Used    Alcohol use No

## 2017-11-20 NOTE — PATIENT INSTRUCTIONS
Thank you for letting us take care of you today. We hope all your questions were addressed. If a question was overlooked or something else comes to mind after you return home, please contact a member of your Care Team listed below. Please make sure you have a routine office visit set up to follow-up on [address]. Your Care Team at [place] [address] is Team #1  Nick Rg MD (Faculty)  Peyton Diaz MD (Faculty  Charybisi Rojas MD (Resident)  Amee Siegel MD (Resident)  Arnol Hooker MD (Resident)  Katja Martin MD (Resident)  Sivakumar Arechiga MD (Resident)  Linnette Up [facility]  Giovanni Guerrero MYRNA KING, [facility], ([address])  ELMER Goldberg, ([address] [facility])  Sarah Carpenter, Ph.D., ([facility])  Camden Morrow, [address] [place] [address] (Clinical Pharmacist)     Office phone number: [phone]    If you need to get in right away due to illness, please be advised we have \"Same Day\" appointments available Monday-Friday. Please call us at [phone] option #1 to schedule your \"Same Day\" appointment.

## 2018-01-15 ENCOUNTER — HOSPITAL ENCOUNTER (OUTPATIENT)
Age: 38
Setting detail: SPECIMEN
Discharge: HOME | End: 2018-01-15
Payer: MEDICARE

## 2018-01-15 ENCOUNTER — OFFICE VISIT (OUTPATIENT)
Dept: FAMILY MEDICINE CLINIC | Age: 38
End: 2018-01-15
Payer: OTHER GOVERNMENT

## 2018-01-15 VITALS
HEART RATE: 93 BPM | TEMPERATURE: 99.4 F | BODY MASS INDEX: 47.24 KG/M2 | DIASTOLIC BLOOD PRESSURE: 86 MMHG | SYSTOLIC BLOOD PRESSURE: 122 MMHG | HEIGHT: 67 IN | WEIGHT: 301 LBS

## 2018-01-15 DIAGNOSIS — R82.5 POSITIVE URINE DRUG SCREEN: ICD-10-CM

## 2018-01-15 DIAGNOSIS — I10 ESSENTIAL HYPERTENSION: ICD-10-CM

## 2018-01-15 DIAGNOSIS — E11.9 TYPE 2 DIABETES MELLITUS WITHOUT COMPLICATION, WITHOUT LONG-TERM CURRENT USE OF INSULIN (HCC): Primary | ICD-10-CM

## 2018-01-15 LAB
ETHANOL PERCENT: <0.01 %
ETHANOL: <10 MG/DL

## 2018-01-15 PROCEDURE — G8417 CALC BMI ABV UP PARAM F/U: HCPCS | Performed by: FAMILY MEDICINE

## 2018-01-15 PROCEDURE — 1036F TOBACCO NON-USER: CPT | Performed by: FAMILY MEDICINE

## 2018-01-15 PROCEDURE — 3046F HEMOGLOBIN A1C LEVEL >9.0%: CPT | Performed by: FAMILY MEDICINE

## 2018-01-15 PROCEDURE — 99213 OFFICE O/P EST LOW 20 MIN: CPT | Performed by: FAMILY MEDICINE

## 2018-01-15 PROCEDURE — G8427 DOCREV CUR MEDS BY ELIG CLIN: HCPCS | Performed by: FAMILY MEDICINE

## 2018-01-15 PROCEDURE — G8484 FLU IMMUNIZE NO ADMIN: HCPCS | Performed by: FAMILY MEDICINE

## 2018-01-15 RX ORDER — UBIQUINOL 100 MG
1 CAPSULE ORAL 3 TIMES DAILY
Qty: 100 EACH | Refills: 1 | Status: SHIPPED | OUTPATIENT
Start: 2018-01-15

## 2018-01-15 RX ORDER — BLOOD-GLUCOSE METER
KIT MISCELLANEOUS
Qty: 1 KIT | Refills: 0 | Status: SHIPPED | OUTPATIENT
Start: 2018-01-15

## 2018-01-15 RX ORDER — GLUCOSAMINE HCL/CHONDROITIN SU 500-400 MG
1 CAPSULE ORAL 3 TIMES DAILY
Qty: 30 STRIP | Refills: 3 | Status: SHIPPED | OUTPATIENT
Start: 2018-01-15 | End: 2018-05-14 | Stop reason: SDUPTHER

## 2018-01-15 ASSESSMENT — ENCOUNTER SYMPTOMS
NAUSEA: 0
DIARRHEA: 0
COLOR CHANGE: 0
VOMITING: 0
COUGH: 0
CONSTIPATION: 0
SHORTNESS OF BREATH: 0
TROUBLE SWALLOWING: 0
ABDOMINAL PAIN: 0

## 2018-01-15 NOTE — PATIENT INSTRUCTIONS
Thank you for letting us take care of you today. We hope all your questions were addressed. If a question was overlooked or something else comes to mind after you return home, please contact a member of your Care Team listed below. Please make sure you have a routine office visit set up to follow-up on [address]. Your Care Team at [place] [address] is Team #1  Alyssa Reddy MD (Faculty)  Tessie Torres MD (Faculty  Crystal Marshall MD (Resident)  Kg Pauilno MD (Resident)  Alan Combs MD (Resident)  Penny Hurtado MD (Resident)  Neisha Ryder MD (Resident)  Dennis Alejandro [facility]  Agustina Anthony MYRNA KING, [facility], ([facility])  ELMER Phipps, ([facility])  Mer Szymanski, Ph.D., ([facility])  Bora Renae, [facility] (Clinical Pharmacist)     Office phone number: [phone]    If you need to get in right away due to illness, please be advised we have \"Same Day\" appointments available Monday-Friday. Please call us at [phone] option #1 to schedule your \"Same Day\" appointment.

## 2018-01-15 NOTE — PROGRESS NOTES
health maintenance  Continue current medications, diet and exercise. Discussed use, benefit, and side effects of prescribed medications. Barriers to medication compliance addressed. Patient given educational materials - see patient instructions  Was a self-tracking handout given in paper form or via [employer]? No    Requested Prescriptions     Signed Prescriptions Disp Refills    Glucose Blood (BLOOD GLUCOSE TEST STRIPS) STRP 30 strip 3     Si each by In Vitro route 3 times daily    glucose monitoring kit (FREESTYLE) monitoring kit 1 kit 0     Sig: Check blood sugar three times daily    SOFT TOUCH LANCETS MISC 100 each 3     Si each by Does not apply route 3 times daily    Alcohol Swabs (ALCOHOL PREP) 70 % PADS 100 each 1     Si each by Does not apply route 3 times daily       All patient questions answered. Patient voiced understanding. Quality Measures    Body mass index is 47.13 kg/m². Elevated. Weight control planned discussed Healthy diet and regular exercise. BP: 122/86 Blood pressure is normal. Treatment plan consists of No treatment change needed.     Lab Results   Component Value Date    LDLCHOLESTEROL 70 2017    (goal LDL reduction with dx if diabetes is 50% LDL reduction)      PHQ Scores 2017   PHQ2 Score 0   PHQ9 Score 0     Interpretation of Total Score Depression Severity: 1-4 = Minimal depression, 5-9 = Mild depression, 10-14 = Moderate depression, 15-19 = Moderately severe depression, 20-27 = Severe depression    Requested Prescriptions     Signed Prescriptions Disp Refills    Glucose Blood (BLOOD GLUCOSE TEST STRIPS) STRP 30 strip 3     Si each by In Vitro route 3 times daily    glucose monitoring kit (FREESTYLE) monitoring kit 1 kit 0     Sig: Check blood sugar three times daily    SOFT TOUCH LANCETS MISC 100 each 3     Si each by Does not apply route 3 times daily    Alcohol Swabs (ALCOHOL PREP) 70 % PADS 100 each 1     Si each by Does not apply route 3 times daily       Medications Discontinued During This Encounter   Medication Reason    Glucose Blood (BLOOD GLUCOSE TEST STRIPS) STRP Reorder    glucose monitoring kit (FREESTYLE) monitoring kit Reorder    SOFT TOUCH LANCETS MISC Reorder    Alcohol Swabs (ALCOHOL PREP) 70 % PADS Reorder         Ryder received counseling on the following healthy behaviors: nutrition, exercise and medication adherence    Patient given educational materials : see patient instruction     Discussed use, benefit, and side effects of prescribed medications. Barriers to medication compliance addressed. All patient questions answered. Pt voiced understanding. Return in about 2 months (around 3/15/2018) for DM. Please note that this chart was generated using voice recognition Dragon dictation software.   Although every effort was made to ensure the accuracy of this automated transcription, some errors in transcription may have occurred

## 2018-01-26 ENCOUNTER — TELEPHONE (OUTPATIENT)
Dept: FAMILY MEDICINE CLINIC | Age: 38
End: 2018-01-26

## 2018-03-24 DIAGNOSIS — E11.9 TYPE 2 DIABETES MELLITUS WITHOUT COMPLICATION, WITHOUT LONG-TERM CURRENT USE OF INSULIN (HCC): ICD-10-CM

## 2018-03-24 DIAGNOSIS — K08.89 PAIN, DENTAL: ICD-10-CM

## 2018-03-26 RX ORDER — IBUPROFEN 800 MG/1
TABLET ORAL
Qty: 40 TABLET | Refills: 0 | Status: SHIPPED | OUTPATIENT
Start: 2018-03-26 | End: 2018-12-17 | Stop reason: SDUPTHER

## 2018-03-26 RX ORDER — METFORMIN HYDROCHLORIDE 500 MG/1
500 TABLET, EXTENDED RELEASE ORAL
Qty: 30 TABLET | Refills: 3 | Status: SHIPPED | OUTPATIENT
Start: 2018-03-26 | End: 2018-05-14

## 2018-05-11 ENCOUNTER — TELEPHONE (OUTPATIENT)
Dept: ADMINISTRATIVE | Age: 38
End: 2018-05-11

## 2018-05-11 DIAGNOSIS — E11.9 TYPE 2 DIABETES MELLITUS WITHOUT COMPLICATION, WITHOUT LONG-TERM CURRENT USE OF INSULIN (HCC): ICD-10-CM

## 2018-05-11 RX ORDER — GLIMEPIRIDE 4 MG/1
8 TABLET ORAL EVERY MORNING
Qty: 30 TABLET | Refills: 0 | Status: SHIPPED | OUTPATIENT
Start: 2018-05-11 | End: 2018-05-14 | Stop reason: SDUPTHER

## 2018-05-11 RX ORDER — LISINOPRIL AND HYDROCHLOROTHIAZIDE 25; 20 MG/1; MG/1
1 TABLET ORAL DAILY
Qty: 30 TABLET | Refills: 0 | Status: SHIPPED | OUTPATIENT
Start: 2018-05-11 | End: 2018-05-14 | Stop reason: SDUPTHER

## 2018-05-14 ENCOUNTER — OFFICE VISIT (OUTPATIENT)
Dept: FAMILY MEDICINE CLINIC | Age: 38
End: 2018-05-14
Payer: MEDICARE

## 2018-05-14 VITALS
HEART RATE: 95 BPM | HEIGHT: 67 IN | TEMPERATURE: 99.1 F | BODY MASS INDEX: 47.74 KG/M2 | DIASTOLIC BLOOD PRESSURE: 90 MMHG | WEIGHT: 304.2 LBS | SYSTOLIC BLOOD PRESSURE: 140 MMHG

## 2018-05-14 DIAGNOSIS — E11.9 TYPE 2 DIABETES MELLITUS WITHOUT COMPLICATION, WITHOUT LONG-TERM CURRENT USE OF INSULIN (HCC): ICD-10-CM

## 2018-05-14 LAB — HBA1C MFR BLD: 9.5 %

## 2018-05-14 PROCEDURE — 99213 OFFICE O/P EST LOW 20 MIN: CPT | Performed by: HOSPITALIST

## 2018-05-14 PROCEDURE — 83036 HEMOGLOBIN GLYCOSYLATED A1C: CPT | Performed by: HOSPITALIST

## 2018-05-14 PROCEDURE — 1036F TOBACCO NON-USER: CPT | Performed by: HOSPITALIST

## 2018-05-14 PROCEDURE — 3046F HEMOGLOBIN A1C LEVEL >9.0%: CPT | Performed by: HOSPITALIST

## 2018-05-14 PROCEDURE — 2022F DILAT RTA XM EVC RTNOPTHY: CPT | Performed by: HOSPITALIST

## 2018-05-14 PROCEDURE — G8427 DOCREV CUR MEDS BY ELIG CLIN: HCPCS | Performed by: HOSPITALIST

## 2018-05-14 PROCEDURE — G8417 CALC BMI ABV UP PARAM F/U: HCPCS | Performed by: HOSPITALIST

## 2018-05-14 RX ORDER — GLIMEPIRIDE 4 MG/1
4 TABLET ORAL EVERY MORNING
Qty: 30 TABLET | Refills: 3 | Status: SHIPPED | OUTPATIENT
Start: 2018-05-14 | End: 2018-10-19 | Stop reason: ALTCHOICE

## 2018-05-14 RX ORDER — GLUCOSAMINE HCL/CHONDROITIN SU 500-400 MG
1 CAPSULE ORAL 3 TIMES DAILY
Qty: 30 STRIP | Refills: 3 | Status: SHIPPED | OUTPATIENT
Start: 2018-05-14

## 2018-05-14 RX ORDER — METFORMIN HYDROCHLORIDE 500 MG/1
500 TABLET, EXTENDED RELEASE ORAL
Qty: 30 TABLET | Refills: 3 | Status: CANCELLED | OUTPATIENT
Start: 2018-05-14

## 2018-05-14 RX ORDER — LISINOPRIL AND HYDROCHLOROTHIAZIDE 25; 20 MG/1; MG/1
1 TABLET ORAL DAILY
Qty: 30 TABLET | Refills: 0 | Status: SHIPPED | OUTPATIENT
Start: 2018-05-14 | End: 2018-09-04 | Stop reason: SDUPTHER

## 2018-05-14 ASSESSMENT — ENCOUNTER SYMPTOMS
ABDOMINAL DISTENTION: 0
APNEA: 0
CHEST TIGHTNESS: 0
BLURRED VISION: 0

## 2018-07-20 ENCOUNTER — HOSPITAL ENCOUNTER (OUTPATIENT)
Age: 38
Setting detail: SPECIMEN
Discharge: HOME | End: 2018-07-20
Payer: MEDICARE

## 2018-07-20 ENCOUNTER — OFFICE VISIT (OUTPATIENT)
Dept: FAMILY MEDICINE CLINIC | Age: 38
End: 2018-07-20
Payer: MEDICARE

## 2018-07-20 VITALS
SYSTOLIC BLOOD PRESSURE: 110 MMHG | TEMPERATURE: 97.8 F | HEART RATE: 100 BPM | BODY MASS INDEX: 48.18 KG/M2 | WEIGHT: 307 LBS | HEIGHT: 67 IN | DIASTOLIC BLOOD PRESSURE: 78 MMHG

## 2018-07-20 DIAGNOSIS — I10 ESSENTIAL HYPERTENSION: ICD-10-CM

## 2018-07-20 DIAGNOSIS — E11.9 TYPE 2 DIABETES MELLITUS WITHOUT COMPLICATION, WITHOUT LONG-TERM CURRENT USE OF INSULIN (HCC): Primary | ICD-10-CM

## 2018-07-20 LAB
CHOLESTEROL, FASTING: 212 MG/DL
CHOLESTEROL/HDL RATIO: 3.9
CREATININE URINE: 49.1 MG/DL (ref 39–259)
HDLC SERPL-MCNC: 54 MG/DL
LDL CHOLESTEROL: 116 MG/DL (ref 0–130)
MICROALBUMIN/CREAT 24H UR: <12 MG/L
MICROALBUMIN/CREAT UR-RTO: NORMAL MCG/MG CREAT
TRIGLYCERIDE, FASTING: 208 MG/DL
VLDLC SERPL CALC-MCNC: ABNORMAL MG/DL (ref 1–30)

## 2018-07-20 PROCEDURE — 3046F HEMOGLOBIN A1C LEVEL >9.0%: CPT | Performed by: FAMILY MEDICINE

## 2018-07-20 PROCEDURE — G8427 DOCREV CUR MEDS BY ELIG CLIN: HCPCS | Performed by: FAMILY MEDICINE

## 2018-07-20 PROCEDURE — 1036F TOBACCO NON-USER: CPT | Performed by: FAMILY MEDICINE

## 2018-07-20 PROCEDURE — G8417 CALC BMI ABV UP PARAM F/U: HCPCS | Performed by: FAMILY MEDICINE

## 2018-07-20 PROCEDURE — 99213 OFFICE O/P EST LOW 20 MIN: CPT | Performed by: FAMILY MEDICINE

## 2018-07-20 PROCEDURE — 2022F DILAT RTA XM EVC RTNOPTHY: CPT | Performed by: FAMILY MEDICINE

## 2018-07-20 ASSESSMENT — ENCOUNTER SYMPTOMS
DIARRHEA: 0
COUGH: 0
ABDOMINAL PAIN: 0
NAUSEA: 0
CONSTIPATION: 0
SHORTNESS OF BREATH: 0
VOMITING: 0

## 2018-07-20 NOTE — PROGRESS NOTES
Subjective:      Brooke Schwarz is a 45 y.o. male with Hx of  has a past medical history of Diabetes mellitus ([facility] Erectile dysfunction; Hypertension; Morbid obesity ([facility] [address] and Type 2 diabetes mellitus without complication ([facility] [address] Presented to the office today for:     HPI    Follow-up of Type 2 diabetes mellitus. Lab Results   Component Value Date    LABA1C 9.5 05/14/2018     Feels well overall and denies any symptoms related to DM including polyuria, polyphagia, polydipsia or unexpected weight changes. On metformin 1000mgQD, amaryl 4mgQD, and Januvia 100mgQD. No compliance issues. Home monitoring shows fastings of 200-300. Known diabetic complications: none  Cardiovascular risk factors: diabetes mellitus, dyslipidemia, hypertension, male gender and obesity (BMI >= 30 kg/m2)  Eye exam current (within one year): No  Weight trend: stable  Current diet: in general, a \"healthy\" diet    Current exercise: none  Any episodes of hypoglycemia? no  Fluctuating blood sugars?no  Is He on ACE inhibitor or angiotensin II receptor blocker? Yes     HTN  Presents for evaluation of hypertension. He indicates that he is feeling well and denies any symptoms referable to his elevated blood pressure. Specifically denies headache, vision changes, chest pain, palpitations, dyspnea, orthopnea, or peripheral edema. Current medication regimen include lisinopril-HCTZ 20-25mgQD. Patient denies any side effects of medication. No compliance issues. Review of Systems   Constitutional: Negative for chills and fever. Eyes: Negative for visual disturbance. Respiratory: Negative for cough and shortness of breath. Cardiovascular: Negative for chest pain, palpitations and leg swelling. Gastrointestinal: Negative for abdominal pain, constipation, diarrhea, nausea and vomiting. Genitourinary: Negative for difficulty urinating. Musculoskeletal: Negative for arthralgias and myalgias. Neurological: Negative for headaches.

## 2018-09-04 RX ORDER — LISINOPRIL AND HYDROCHLOROTHIAZIDE 20; 12.5 MG/1; MG/1
TABLET ORAL
Qty: 30 TABLET | Refills: 3 | Status: SHIPPED | OUTPATIENT
Start: 2018-09-04 | End: 2018-09-25 | Stop reason: SDUPTHER

## 2018-09-04 NOTE — TELEPHONE ENCOUNTER
Please address the medication refill and close the encounter. If I can be of assistance, please route to the applicable pool. Thank you. Last visit:  Last Med refill:    Next Visit Date:  Future Appointments  Date Time Provider Liana Davila   9/21/2018 8:30 AM John Hankins MD [address] [place] [address] Maintenance   Topic Date Due    Diabetic retinal exam  05/26/2017    A1C test (Diabetic or Prediabetic)  08/14/2018    Potassium monitoring  08/22/2018    Creatinine monitoring  08/22/2018    Flu vaccine (1) 09/01/2018    Diabetic foot exam  01/15/2019    Diabetic microalbuminuria test  07/20/2019    Lipid screen  07/20/2019    DTaP/Tdap/Td vaccine (2 - Td) 08/15/2027    Pneumococcal med risk  Completed    HIV screen  Completed       Hemoglobin A1C (%)   Date Value   05/14/2018 9.5   11/20/2017 8.9   06/01/2017 8.0             ( goal A1C is < 7)   Microalb/Crt.  Ratio (mcg/mg creat)   Date Value   07/20/2018 CANNOT BE CALCULATED     LDL Cholesterol (mg/dL)   Date Value   07/20/2018 116   06/01/2017 70       (goal LDL is <100)   AST (U/L)   Date Value   04/28/2016 21     ALT (U/L)   Date Value   04/28/2016 36     BUN (mg/dL)   Date Value   04/28/2016 6     BP Readings from Last 3 Encounters:   07/20/18 110/78   05/14/18 (!) 140/90   01/15/18 122/86          (goal 120/80)    All Future Testing planned in [facility]              Patient Active Problem List:     Type 2 diabetes mellitus without complication ([address]     Essential hypertension     Morbid obesity ([address]     Scalp lesion     Healthcare maintenance     Erectile dysfunction

## 2018-09-21 ENCOUNTER — TELEPHONE (OUTPATIENT)
Dept: FAMILY MEDICINE CLINIC | Age: 38
End: 2018-09-21

## 2018-09-24 DIAGNOSIS — E11.9 TYPE 2 DIABETES MELLITUS WITHOUT COMPLICATION, WITHOUT LONG-TERM CURRENT USE OF INSULIN (HCC): ICD-10-CM

## 2018-09-25 RX ORDER — LISINOPRIL AND HYDROCHLOROTHIAZIDE 25; 20 MG/1; MG/1
TABLET ORAL
Qty: 30 TABLET | Refills: 3 | Status: SHIPPED | OUTPATIENT
Start: 2018-09-25 | End: 2019-06-10 | Stop reason: SDUPTHER

## 2018-09-26 PROBLEM — Z00.00 HEALTHCARE MAINTENANCE: Status: RESOLVED | Noted: 2017-08-15 | Resolved: 2018-09-26

## 2018-10-14 ENCOUNTER — HOSPITAL ENCOUNTER (EMERGENCY)
Age: 38
Discharge: HOME | End: 2018-10-14
Attending: EMERGENCY MEDICINE
Payer: MEDICARE

## 2018-10-14 VITALS
DIASTOLIC BLOOD PRESSURE: 102 MMHG | HEART RATE: 84 BPM | OXYGEN SATURATION: 96 % | SYSTOLIC BLOOD PRESSURE: 148 MMHG | WEIGHT: 290 LBS | TEMPERATURE: 98.3 F | RESPIRATION RATE: 14 BRPM | BODY MASS INDEX: 45.41 KG/M2

## 2018-10-14 DIAGNOSIS — K02.9 PAIN DUE TO DENTAL CARIES: Primary | ICD-10-CM

## 2018-10-14 PROCEDURE — 99282 EMERGENCY DEPT VISIT SF MDM: CPT

## 2018-10-14 PROCEDURE — 6370000000 HC RX 637 (ALT 250 FOR IP): Performed by: EMERGENCY MEDICINE

## 2018-10-14 RX ORDER — IBUPROFEN 800 MG/1
800 TABLET ORAL EVERY 8 HOURS PRN
Qty: 30 TABLET | Refills: 0 | Status: SHIPPED | OUTPATIENT
Start: 2018-10-14 | End: 2019-01-22

## 2018-10-14 RX ORDER — PENICILLIN V POTASSIUM 500 MG/1
500 TABLET ORAL 4 TIMES DAILY
Qty: 28 TABLET | Refills: 0 | Status: SHIPPED | OUTPATIENT
Start: 2018-10-14 | End: 2018-10-21

## 2018-10-14 RX ORDER — ACETAMINOPHEN 325 MG/1
650 TABLET ORAL ONCE
Status: COMPLETED | OUTPATIENT
Start: 2018-10-14 | End: 2018-10-14

## 2018-10-14 RX ORDER — PENICILLIN V POTASSIUM 250 MG/1
500 TABLET ORAL ONCE
Status: COMPLETED | OUTPATIENT
Start: 2018-10-14 | End: 2018-10-14

## 2018-10-14 RX ADMIN — PENICILLIN V POTASSIUM 500 MG: 250 TABLET ORAL at 05:46

## 2018-10-14 RX ADMIN — BENZOCAINE 1 EACH: 220 GEL, DENTIFRICE DENTAL at 05:46

## 2018-10-14 RX ADMIN — ACETAMINOPHEN 650 MG: 325 TABLET ORAL at 05:46

## 2018-10-14 ASSESSMENT — PAIN DESCRIPTION - PAIN TYPE: TYPE: ACUTE PAIN

## 2018-10-14 ASSESSMENT — ENCOUNTER SYMPTOMS
VOMITING: 0
ABDOMINAL PAIN: 0
NAUSEA: 0
TROUBLE SWALLOWING: 0
SHORTNESS OF BREATH: 0
VOICE CHANGE: 0

## 2018-10-14 ASSESSMENT — PAIN DESCRIPTION - ORIENTATION: ORIENTATION: LEFT;UPPER

## 2018-10-14 ASSESSMENT — PAIN DESCRIPTION - DESCRIPTORS: DESCRIPTORS: ACHING

## 2018-10-14 ASSESSMENT — PAIN DESCRIPTION - LOCATION: LOCATION: TEETH

## 2018-10-14 ASSESSMENT — PAIN SCALES - GENERAL
PAINLEVEL_OUTOF10: 10
PAINLEVEL_OUTOF10: 10

## 2018-10-14 ASSESSMENT — PAIN DESCRIPTION - FREQUENCY: FREQUENCY: CONTINUOUS

## 2018-10-14 NOTE — ED PROVIDER NOTES
[facility]  Emergency Department  Faculty Attestation     I performed a history and physical examination of the patient and discussed management with the resident. I reviewed the residents note and agree with the documented findings and plan of care. Any areas of disagreement are noted on the chart. I was personally present for the key portions of any procedures. I have documented in the chart those procedures where I was not present during the key portions. I have reviewed the emergency nurses triage note. I agree with the chief complaint, past medical history, past surgical history, allergies, medications, social and family history as documented unless otherwise noted below. For Physician Assistant/ Nurse Practitioner cases/documentation I have personally evaluated this patient and have completed at least one if not all key elements of the E/M (history, physical exam, and MDM). Additional findings are as noted. Primary Care Physician:  Walter Juarez MD    Screenings:  [unfilled]    CHIEF COMPLAINT       Chief Complaint   Patient presents with    Dental Pain     x1 day, pt .[facility] [place], states unable to get into them for appointment       RECENT VITALS:   Temp: 98.3 °F (36.8 °C),  Pulse: 84, Resp: 14, BP: (!) 148/102    LABS:  Labs Reviewed - No data to display    Radiology  No orders to display           Attending Physician Additional  Notes    Patient has left upper toothache. He states his been having tooth problems there for some time and wants his teeth pulled. He is told by a dentist that the severity difficult and he might need general anesthesia. He denies facial swelling. Fevers. Trauma. Trismus. On exam he is hypertensive afebrile nontoxic. No trismus. There is gingival inflammation left upper bicuspid region. He has tooth fracture at the gumline of the first and extensive caries of the second.   There is tooth tenderness suggesting

## 2018-10-14 NOTE — ED PROVIDER NOTES
Medications:  Prior to Admission medications    Medication Sig Start Date End Date Taking? Authorizing Provider   penicillin v potassium (VEETID) 500 MG tablet Take 1 tablet by mouth 4 times daily for 7 days 10/14/18 10/21/18 Yes Marcia Copeland MD   ibuprofen (ADVIL;MOTRIN) 800 MG tablet Take 1 tablet by mouth every 8 hours as needed for Pain 10/14/18  Yes Marcia Copeland MD   benzocaine (ORAJEL) 10 % mucosal gel Take by mouth as needed. 10/14/18  Yes Marcia Copeland MD   lisinopril-hydrochlorothiazide (PRINZIDE;ZESTORETIC) 20-25 MG per tablet take 1 tablet by mouth once daily 9/25/18   Mariana Bergeron MD   insulin glargine (LANTUS SOLOSTAR) 100 UNIT/ML injection pen Inject 10 Units into the skin nightly 7/20/18   Mariana Bergeron MD   Insulin Pen Needle 31G X 6 MM MISC 1 each by Does not apply route daily 7/20/18   Mariana Bergeron MD   Glucose Blood (BLOOD GLUCOSE TEST STRIPS) STRP 1 each by In Vitro route 3 times daily 5/14/18   Quinten Ward MD   SITagliptin (JANUVIA) 100 MG tablet take 1 tablet by mouth once daily 5/14/18   Quinten Ward MD   SOFT TOUCH LANCETS [address] [facility] 1 each by Does not apply route 3 times daily 5/14/18   Quinten Ward MD   glimepiride (AMARYL) 4 MG tablet Take 1 tablet by mouth every morning 5/14/18   Quinten Ward MD   metFORMIN (GLUCOPHAGE) 1000 MG tablet Take 1 tablet by mouth 2 times daily (with meals) 5/14/18   Quinten Ward MD   ibuprofen (ADVIL;MOTRIN) 800 MG tablet take 1 tablet by mouth every 6 hours if needed for pain 3/26/18   Mariana Bergeron MD   glucose monitoring kit (FREESTYLE) monitoring kit Check blood sugar three times daily 1/15/18   Mariana Bergeron MD   Alcohol Swabs (ALCOHOL PREP) 70 % PADS 1 each by Does not apply route 3 times daily 1/15/18   Mariana Bergeron MD       REVIEW OF SYSTEMS    (2-9 systems for level 4, 10 or more for level 5)      Review of Systems   Constitutional: Negative for chills and fever. HENT: Positive for dental problem.  Negative for trouble swallowing and voice change. Respiratory: Negative for shortness of breath. Cardiovascular: Negative for chest pain. Gastrointestinal: Negative for abdominal pain, nausea and vomiting. Genitourinary: Negative for dysuria. Neurological: Negative for weakness and numbness. PHYSICAL EXAM   (up to 7 for level 4, 8 or more for level 5)      INITIAL VITALS:   BP (!) 148/102   Pulse 84   Temp 98.3 °F (36.8 °C) (Oral)   Resp 14   Wt 290 lb (131.5 kg)   SpO2 96%   BMI 45.41 kg/m²     Physical Exam   Constitutional: He appears well-developed and well-nourished. No distress. HENT:   Head: Normocephalic and atraumatic. No trismus, no periapical abscess, no tenderness over the floor of the mouth    Poor dentition   Eyes: EOM are normal.   Cardiovascular: Normal rate, regular rhythm and normal heart sounds. Exam reveals no gallop and no friction rub. No murmur heard. Pulmonary/Chest: Effort normal and breath sounds normal. No respiratory distress. He has no wheezes. He has no rales. Abdominal: Soft. He exhibits no distension. There is no tenderness. There is no rebound and no guarding. Musculoskeletal: He exhibits no tenderness. Skin: Skin is warm and dry. No erythema. DIFFERENTIAL  DIAGNOSIS     PLAN (LABS / IMAGING / EKG):  No orders of the defined types were placed in this encounter. MEDICATIONS ORDERED:  Orders Placed This Encounter   Medications    penicillin v potassium (VEETID) tablet 500 mg    acetaminophen (TYLENOL) tablet 650 mg    benzocaine (LOLLICAINE) 20 % dental swab    penicillin v potassium (VEETID) 500 MG tablet     Sig: Take 1 tablet by mouth 4 times daily for 7 days     Dispense:  28 tablet     Refill:  0    ibuprofen (ADVIL;MOTRIN) 800 MG tablet     Sig: Take 1 tablet by mouth every 8 hours as needed for Pain     Dispense:  30 tablet     Refill:  0    benzocaine (ORAJEL) 10 % mucosal gel     Sig: Take by mouth as needed.      Dispense:  5.3 g     Refill: 0       DDX: Dental caries    DIAGNOSTIC RESULTS / EMERGENCY DEPARTMENT COURSE / MDM     LABS:  No results found for this visit on 10/14/18. IMPRESSION: 40-year-old male comes in with dental pain, patient has a dentist and says that he was set up an appointment, will otherwise prescribe penicillin, give ibuprofen for pain as well as Orajel. Plan to otherwise discharge with dentist follow-up, no red flags at this time otherwise     RADIOLOGY:  None    EKG  None    All EKG's are interpreted by the Emergency Department Physician who either signs or Co-signs this chart in the absence of a cardiologist.      PROCEDURES:  None    CONSULTS:  None    CRITICAL CARE:  None    FINAL IMPRESSION      1. Pain due to dental caries          DISPOSITION / PLAN     DISPOSITION Decision To Discharge 10/14/2018 05:19:06 AM      PATIENT REFERRED TO:  Dental clinic appointment as set up. DISCHARGE MEDICATIONS:  New Prescriptions    BENZOCAINE (ORAJEL) 10 % MUCOSAL GEL    Take by mouth as needed.     IBUPROFEN (ADVIL;MOTRIN) 800 MG TABLET    Take 1 tablet by mouth every 8 hours as needed for Pain    PENICILLIN V POTASSIUM (VEETID) 500 MG TABLET    Take 1 tablet by mouth 4 times daily for 7 days       Yolande Hyatt MD  Emergency Medicine Resident    (Please note that portions of thisnote were completed with a voice recognition program.  Efforts were made to edit the dictations but occasionally words are mis-transcribed.)       Yolande Hyatt MD  10/14/18 0282

## 2018-10-19 ENCOUNTER — OFFICE VISIT (OUTPATIENT)
Dept: FAMILY MEDICINE CLINIC | Age: 38
End: 2018-10-19
Payer: MEDICARE

## 2018-10-19 VITALS
TEMPERATURE: 97 F | DIASTOLIC BLOOD PRESSURE: 80 MMHG | HEIGHT: 68 IN | BODY MASS INDEX: 45.62 KG/M2 | HEART RATE: 85 BPM | WEIGHT: 301 LBS | SYSTOLIC BLOOD PRESSURE: 130 MMHG

## 2018-10-19 DIAGNOSIS — Z79.4 TYPE 2 DIABETES MELLITUS WITHOUT COMPLICATION, WITH LONG-TERM CURRENT USE OF INSULIN (HCC): Primary | ICD-10-CM

## 2018-10-19 DIAGNOSIS — E11.9 TYPE 2 DIABETES MELLITUS WITHOUT COMPLICATION, WITH LONG-TERM CURRENT USE OF INSULIN (HCC): Primary | ICD-10-CM

## 2018-10-19 LAB — HBA1C MFR BLD: 10.9 %

## 2018-10-19 PROCEDURE — G8427 DOCREV CUR MEDS BY ELIG CLIN: HCPCS | Performed by: STUDENT IN AN ORGANIZED HEALTH CARE EDUCATION/TRAINING PROGRAM

## 2018-10-19 PROCEDURE — G8417 CALC BMI ABV UP PARAM F/U: HCPCS | Performed by: STUDENT IN AN ORGANIZED HEALTH CARE EDUCATION/TRAINING PROGRAM

## 2018-10-19 PROCEDURE — G8484 FLU IMMUNIZE NO ADMIN: HCPCS | Performed by: STUDENT IN AN ORGANIZED HEALTH CARE EDUCATION/TRAINING PROGRAM

## 2018-10-19 PROCEDURE — 99213 OFFICE O/P EST LOW 20 MIN: CPT | Performed by: STUDENT IN AN ORGANIZED HEALTH CARE EDUCATION/TRAINING PROGRAM

## 2018-10-19 PROCEDURE — 83036 HEMOGLOBIN GLYCOSYLATED A1C: CPT | Performed by: STUDENT IN AN ORGANIZED HEALTH CARE EDUCATION/TRAINING PROGRAM

## 2018-10-19 PROCEDURE — 2022F DILAT RTA XM EVC RTNOPTHY: CPT | Performed by: STUDENT IN AN ORGANIZED HEALTH CARE EDUCATION/TRAINING PROGRAM

## 2018-10-19 PROCEDURE — 3046F HEMOGLOBIN A1C LEVEL >9.0%: CPT | Performed by: STUDENT IN AN ORGANIZED HEALTH CARE EDUCATION/TRAINING PROGRAM

## 2018-10-19 PROCEDURE — 1036F TOBACCO NON-USER: CPT | Performed by: STUDENT IN AN ORGANIZED HEALTH CARE EDUCATION/TRAINING PROGRAM

## 2018-10-19 ASSESSMENT — PATIENT HEALTH QUESTIONNAIRE - PHQ9
2. FEELING DOWN, DEPRESSED OR HOPELESS: 0
SUM OF ALL RESPONSES TO PHQ QUESTIONS 1-9: 0
SUM OF ALL RESPONSES TO PHQ9 QUESTIONS 1 & 2: 0
1. LITTLE INTEREST OR PLEASURE IN DOING THINGS: 0
SUM OF ALL RESPONSES TO PHQ QUESTIONS 1-9: 0

## 2018-10-19 ASSESSMENT — ENCOUNTER SYMPTOMS
SHORTNESS OF BREATH: 0
COUGH: 0

## 2018-11-19 ENCOUNTER — OFFICE VISIT (OUTPATIENT)
Dept: FAMILY MEDICINE CLINIC | Age: 38
End: 2018-11-19
Payer: MEDICARE

## 2018-11-19 VITALS
DIASTOLIC BLOOD PRESSURE: 84 MMHG | WEIGHT: 305.4 LBS | HEART RATE: 84 BPM | BODY MASS INDEX: 46.29 KG/M2 | TEMPERATURE: 99.3 F | HEIGHT: 68 IN | SYSTOLIC BLOOD PRESSURE: 135 MMHG

## 2018-11-19 DIAGNOSIS — K04.7 TOOTH INFECTION: ICD-10-CM

## 2018-11-19 DIAGNOSIS — E11.9 TYPE 2 DIABETES MELLITUS WITHOUT COMPLICATION, WITH LONG-TERM CURRENT USE OF INSULIN (HCC): Primary | ICD-10-CM

## 2018-11-19 DIAGNOSIS — Z79.4 TYPE 2 DIABETES MELLITUS WITHOUT COMPLICATION, WITH LONG-TERM CURRENT USE OF INSULIN (HCC): Primary | ICD-10-CM

## 2018-11-19 DIAGNOSIS — Z23 NEED FOR INFLUENZA VACCINATION: ICD-10-CM

## 2018-11-19 PROCEDURE — G8482 FLU IMMUNIZE ORDER/ADMIN: HCPCS | Performed by: FAMILY MEDICINE

## 2018-11-19 PROCEDURE — 99213 OFFICE O/P EST LOW 20 MIN: CPT | Performed by: FAMILY MEDICINE

## 2018-11-19 PROCEDURE — G8417 CALC BMI ABV UP PARAM F/U: HCPCS | Performed by: FAMILY MEDICINE

## 2018-11-19 PROCEDURE — 3046F HEMOGLOBIN A1C LEVEL >9.0%: CPT | Performed by: FAMILY MEDICINE

## 2018-11-19 PROCEDURE — 1036F TOBACCO NON-USER: CPT | Performed by: FAMILY MEDICINE

## 2018-11-19 PROCEDURE — G8427 DOCREV CUR MEDS BY ELIG CLIN: HCPCS | Performed by: FAMILY MEDICINE

## 2018-11-19 PROCEDURE — 90688 IIV4 VACCINE SPLT 0.5 ML IM: CPT | Performed by: FAMILY MEDICINE

## 2018-11-19 PROCEDURE — 2022F DILAT RTA XM EVC RTNOPTHY: CPT | Performed by: FAMILY MEDICINE

## 2018-11-19 RX ORDER — AMOXICILLIN 500 MG/1
500 CAPSULE ORAL 3 TIMES DAILY
Qty: 21 CAPSULE | Refills: 0 | Status: SHIPPED | OUTPATIENT
Start: 2018-11-19 | End: 2018-11-26

## 2018-11-19 ASSESSMENT — ENCOUNTER SYMPTOMS
DIARRHEA: 0
NAUSEA: 0
COUGH: 0
CONSTIPATION: 0
SHORTNESS OF BREATH: 0
VOMITING: 0
ABDOMINAL PAIN: 0

## 2018-12-16 DIAGNOSIS — E11.9 TYPE 2 DIABETES MELLITUS WITHOUT COMPLICATION, WITH LONG-TERM CURRENT USE OF INSULIN (HCC): ICD-10-CM

## 2018-12-16 DIAGNOSIS — Z79.4 TYPE 2 DIABETES MELLITUS WITHOUT COMPLICATION, WITH LONG-TERM CURRENT USE OF INSULIN (HCC): ICD-10-CM

## 2018-12-17 ENCOUNTER — HOSPITAL ENCOUNTER (EMERGENCY)
Age: 38
Discharge: HOME | End: 2018-12-17
Attending: EMERGENCY MEDICINE
Payer: MEDICARE

## 2018-12-17 VITALS
WEIGHT: 240 LBS | TEMPERATURE: 98.2 F | HEART RATE: 87 BPM | BODY MASS INDEX: 32.51 KG/M2 | DIASTOLIC BLOOD PRESSURE: 76 MMHG | SYSTOLIC BLOOD PRESSURE: 163 MMHG | HEIGHT: 72 IN | RESPIRATION RATE: 16 BRPM | OXYGEN SATURATION: 98 %

## 2018-12-17 DIAGNOSIS — Z87.438 HISTORY OF PENILE DISCHARGE: Primary | ICD-10-CM

## 2018-12-17 DIAGNOSIS — R03.0 ELEVATED BLOOD PRESSURE READING: ICD-10-CM

## 2018-12-17 PROCEDURE — 87491 CHLMYD TRACH DNA AMP PROBE: CPT

## 2018-12-17 PROCEDURE — 6360000002 HC RX W HCPCS: Performed by: PHYSICIAN ASSISTANT

## 2018-12-17 PROCEDURE — 96372 THER/PROPH/DIAG INJ SC/IM: CPT

## 2018-12-17 PROCEDURE — 87591 N.GONORRHOEAE DNA AMP PROB: CPT

## 2018-12-17 PROCEDURE — 99283 EMERGENCY DEPT VISIT LOW MDM: CPT

## 2018-12-17 PROCEDURE — 6370000000 HC RX 637 (ALT 250 FOR IP): Performed by: PHYSICIAN ASSISTANT

## 2018-12-17 RX ORDER — CEFTRIAXONE SODIUM 250 MG/1
250 INJECTION, POWDER, FOR SOLUTION INTRAMUSCULAR; INTRAVENOUS ONCE
Status: COMPLETED | OUTPATIENT
Start: 2018-12-17 | End: 2018-12-17

## 2018-12-17 RX ORDER — GLIMEPIRIDE 4 MG/1
TABLET ORAL
Qty: 90 TABLET | Refills: 0 | Status: SHIPPED | OUTPATIENT
Start: 2018-12-17 | End: 2018-12-17 | Stop reason: ALTCHOICE

## 2018-12-17 RX ORDER — AZITHROMYCIN 250 MG/1
1000 TABLET, FILM COATED ORAL ONCE
Status: COMPLETED | OUTPATIENT
Start: 2018-12-17 | End: 2018-12-17

## 2018-12-17 RX ORDER — SITAGLIPTIN 100 MG/1
TABLET, FILM COATED ORAL
Qty: 90 TABLET | Refills: 0 | Status: SHIPPED | OUTPATIENT
Start: 2018-12-17 | End: 2019-11-21 | Stop reason: SDUPTHER

## 2018-12-17 RX ORDER — GLIMEPIRIDE 4 MG/1
TABLET ORAL
Qty: 90 TABLET | Refills: 0 | Status: SHIPPED | OUTPATIENT
Start: 2018-12-17 | End: 2018-12-17 | Stop reason: SDUPTHER

## 2018-12-17 RX ADMIN — CEFTRIAXONE SODIUM 250 MG: 250 INJECTION, POWDER, FOR SOLUTION INTRAMUSCULAR; INTRAVENOUS at 13:17

## 2018-12-17 RX ADMIN — AZITHROMYCIN 1000 MG: 250 TABLET, FILM COATED ORAL at 13:18

## 2018-12-17 NOTE — ED PROVIDER NOTES
 Morbid obesity ([facility] 2016    Type 2 diabetes mellitus without complication ([facility]        SURGICAL HISTORY           Procedure Laterality Date    PRE-MALIGNANT / BENIGN SKIN LESION EXCISION Right 2017    rt scalp lesion removed    TONSILLECTOMY           FAMILY HISTORY       Family History   Problem Relation Age of Onset    Diabetes Mother     High Cholesterol Mother     High Blood Pressure Mother     Heart Disease Mother      Family Status   Relation Status    Mother Alive    Father         SOCIAL HISTORY      reports that he has never smoked. He has never used smokeless tobacco. He reports that he does not drink alcohol or use drugs. REVIEW OFSYSTEMS    (2-9 systems for level 4, 10 or more for level 5)   Review of Systems    Except as noted above the remainder of the review of systems was reviewed and negative. PHYSICAL EXAM    (up to 7 for level 4, 8 or more for level 5)     ED Triage Vitals [18 1211]   BP Temp Temp src Pulse Resp SpO2 Height Weight   (!) 163/76 98.2 °F (36.8 °C) -- 87 16 98 % 6' (1.829 m) 240 lb (108.9 kg)     Physical Exam   Constitutional: He is oriented to person, place, and time. He appears well-developed. HENT:   Head: Normocephalic and atraumatic. Eyes: EOM are normal.   Neck: Normal range of motion. Neck supple. Cardiovascular: Normal rate. Pulmonary/Chest: Effort normal.   Abdominal: Soft. He exhibits no distension. There is no tenderness. Genitourinary:   Genitourinary Comments: Declined/deferred   Musculoskeletal: Normal range of motion. Neurological: He is alert and oriented to person, place, and time. Skin: Skin is warm. Psychiatric: He has a normal mood and affect.  His behavior is normal.               DIAGNOSTIC RESULTS     EKG: All EKG's are interpreted by the Emergency Department Physician who either signs or Co-signs this chart in the absence of a cardiologist.        RADIOLOGY:   Non-plain film images such as

## 2018-12-18 LAB
C. TRACHOMATIS DNA ,URINE: NEGATIVE
N. GONORRHOEAE DNA, URINE: NEGATIVE

## 2018-12-28 ENCOUNTER — HOSPITAL ENCOUNTER (EMERGENCY)
Age: 38
Discharge: HOME | End: 2018-12-28
Attending: EMERGENCY MEDICINE
Payer: MEDICARE

## 2018-12-28 VITALS
TEMPERATURE: 98.4 F | OXYGEN SATURATION: 97 % | RESPIRATION RATE: 16 BRPM | HEART RATE: 71 BPM | HEIGHT: 72 IN | DIASTOLIC BLOOD PRESSURE: 83 MMHG | SYSTOLIC BLOOD PRESSURE: 129 MMHG | BODY MASS INDEX: 34.01 KG/M2 | WEIGHT: 251.1 LBS

## 2018-12-28 DIAGNOSIS — N34.1 NGU (NONGONOCOCCAL URETHRITIS): Primary | ICD-10-CM

## 2018-12-28 LAB
-: ABNORMAL
AMORPHOUS: ABNORMAL
BACTERIA: ABNORMAL
BILIRUBIN URINE: NEGATIVE
CASTS UA: ABNORMAL /LPF
COLOR: YELLOW
COMMENT UA: ABNORMAL
CRYSTALS, UA: ABNORMAL /HPF
EPITHELIAL CELLS UA: ABNORMAL /HPF (ref 0–5)
GLUCOSE URINE: NEGATIVE
KETONES, URINE: NEGATIVE
LEUKOCYTE ESTERASE, URINE: ABNORMAL
MUCUS: ABNORMAL
NITRITE, URINE: NEGATIVE
OTHER OBSERVATIONS UA: ABNORMAL
PH UA: 6 (ref 5–8)
PROTEIN UA: NEGATIVE
RBC UA: ABNORMAL /HPF (ref 0–2)
RENAL EPITHELIAL, UA: ABNORMAL /HPF
SPECIFIC GRAVITY UA: 1.03 (ref 1–1.03)
TRICHOMONAS: ABNORMAL
TURBIDITY: ABNORMAL
URINE HGB: ABNORMAL
UROBILINOGEN, URINE: NORMAL
WBC UA: ABNORMAL /HPF (ref 0–5)
YEAST: ABNORMAL

## 2018-12-28 PROCEDURE — 87086 URINE CULTURE/COLONY COUNT: CPT

## 2018-12-28 PROCEDURE — 81001 URINALYSIS AUTO W/SCOPE: CPT

## 2018-12-28 PROCEDURE — 99283 EMERGENCY DEPT VISIT LOW MDM: CPT

## 2018-12-28 PROCEDURE — 87591 N.GONORRHOEAE DNA AMP PROB: CPT

## 2018-12-28 PROCEDURE — 87491 CHLMYD TRACH DNA AMP PROBE: CPT

## 2018-12-28 PROCEDURE — 87109 MYCOPLASMA: CPT

## 2018-12-28 RX ORDER — DOXYCYCLINE 100 MG/1
100 TABLET ORAL 2 TIMES DAILY
Qty: 28 TABLET | Refills: 0 | Status: SHIPPED | OUTPATIENT
Start: 2018-12-28 | End: 2019-01-07

## 2018-12-29 LAB
CULTURE: NO GROWTH
Lab: NORMAL
SPECIMEN DESCRIPTION: NORMAL
STATUS: NORMAL

## 2018-12-30 LAB
C. TRACHOMATIS DNA ,URINE: NEGATIVE
N. GONORRHOEAE DNA, URINE: NEGATIVE

## 2019-01-02 LAB — UREAP-MYCOPLASMA CUL: NORMAL

## 2019-01-22 ENCOUNTER — OFFICE VISIT (OUTPATIENT)
Dept: FAMILY MEDICINE CLINIC | Age: 39
End: 2019-01-22
Payer: MEDICARE

## 2019-01-22 ENCOUNTER — HOSPITAL ENCOUNTER (OUTPATIENT)
Age: 39
Setting detail: SPECIMEN
Discharge: HOME | End: 2019-01-22
Payer: MEDICARE

## 2019-01-22 VITALS
DIASTOLIC BLOOD PRESSURE: 87 MMHG | HEIGHT: 67 IN | BODY MASS INDEX: 49.28 KG/M2 | HEART RATE: 93 BPM | WEIGHT: 314 LBS | SYSTOLIC BLOOD PRESSURE: 136 MMHG | TEMPERATURE: 98.6 F

## 2019-01-22 DIAGNOSIS — Z79.4 TYPE 2 DIABETES MELLITUS WITHOUT COMPLICATION, WITH LONG-TERM CURRENT USE OF INSULIN (HCC): ICD-10-CM

## 2019-01-22 DIAGNOSIS — E11.9 TYPE 2 DIABETES MELLITUS WITHOUT COMPLICATION, WITH LONG-TERM CURRENT USE OF INSULIN (HCC): Primary | ICD-10-CM

## 2019-01-22 DIAGNOSIS — E11.9 TYPE 2 DIABETES MELLITUS WITHOUT COMPLICATION, WITH LONG-TERM CURRENT USE OF INSULIN (HCC): ICD-10-CM

## 2019-01-22 DIAGNOSIS — E66.01 MORBID OBESITY WITH BMI OF 45.0-49.9, ADULT (HCC): ICD-10-CM

## 2019-01-22 DIAGNOSIS — Z79.4 TYPE 2 DIABETES MELLITUS WITHOUT COMPLICATION, WITH LONG-TERM CURRENT USE OF INSULIN (HCC): Primary | ICD-10-CM

## 2019-01-22 DIAGNOSIS — I10 ESSENTIAL HYPERTENSION: ICD-10-CM

## 2019-01-22 LAB
ANION GAP SERPL CALCULATED.3IONS-SCNC: 17 MMOL/L (ref 9–17)
BUN BLDV-MCNC: 9 MG/DL (ref 6–20)
BUN/CREAT BLD: ABNORMAL (ref 9–20)
CALCIUM SERPL-MCNC: 9.2 MG/DL (ref 8.6–10.4)
CHLORIDE BLD-SCNC: 98 MMOL/L (ref 98–107)
CO2: 26 MMOL/L (ref 20–31)
CREAT SERPL-MCNC: 0.8 MG/DL (ref 0.7–1.2)
GFR AFRICAN AMERICAN: >60 ML/MIN
GFR NON-AFRICAN AMERICAN: >60 ML/MIN
GFR SERPL CREATININE-BSD FRML MDRD: ABNORMAL ML/MIN/{1.73_M2}
GFR SERPL CREATININE-BSD FRML MDRD: ABNORMAL ML/MIN/{1.73_M2}
GLUCOSE BLD-MCNC: 265 MG/DL (ref 70–99)
HBA1C MFR BLD: 10.6 %
POTASSIUM SERPL-SCNC: 3.9 MMOL/L (ref 3.7–5.3)
SODIUM BLD-SCNC: 141 MMOL/L (ref 135–144)

## 2019-01-22 PROCEDURE — 36415 COLL VENOUS BLD VENIPUNCTURE: CPT

## 2019-01-22 PROCEDURE — 99211 OFF/OP EST MAY X REQ PHY/QHP: CPT

## 2019-01-22 PROCEDURE — G8417 CALC BMI ABV UP PARAM F/U: HCPCS | Performed by: STUDENT IN AN ORGANIZED HEALTH CARE EDUCATION/TRAINING PROGRAM

## 2019-01-22 PROCEDURE — 3046F HEMOGLOBIN A1C LEVEL >9.0%: CPT | Performed by: STUDENT IN AN ORGANIZED HEALTH CARE EDUCATION/TRAINING PROGRAM

## 2019-01-22 PROCEDURE — G8482 FLU IMMUNIZE ORDER/ADMIN: HCPCS | Performed by: STUDENT IN AN ORGANIZED HEALTH CARE EDUCATION/TRAINING PROGRAM

## 2019-01-22 PROCEDURE — 80048 BASIC METABOLIC PNL TOTAL CA: CPT

## 2019-01-22 PROCEDURE — 99213 OFFICE O/P EST LOW 20 MIN: CPT | Performed by: STUDENT IN AN ORGANIZED HEALTH CARE EDUCATION/TRAINING PROGRAM

## 2019-01-22 PROCEDURE — 2022F DILAT RTA XM EVC RTNOPTHY: CPT | Performed by: STUDENT IN AN ORGANIZED HEALTH CARE EDUCATION/TRAINING PROGRAM

## 2019-01-22 PROCEDURE — G8427 DOCREV CUR MEDS BY ELIG CLIN: HCPCS | Performed by: STUDENT IN AN ORGANIZED HEALTH CARE EDUCATION/TRAINING PROGRAM

## 2019-01-22 PROCEDURE — 83036 HEMOGLOBIN GLYCOSYLATED A1C: CPT | Performed by: STUDENT IN AN ORGANIZED HEALTH CARE EDUCATION/TRAINING PROGRAM

## 2019-01-22 PROCEDURE — 1036F TOBACCO NON-USER: CPT | Performed by: STUDENT IN AN ORGANIZED HEALTH CARE EDUCATION/TRAINING PROGRAM

## 2019-01-22 RX ORDER — IBUPROFEN 800 MG/1
800 TABLET ORAL EVERY 6 HOURS PRN
Qty: 30 TABLET | Refills: 0 | Status: SHIPPED | OUTPATIENT
Start: 2019-01-22 | End: 2019-05-15 | Stop reason: ALTCHOICE

## 2019-01-22 RX ORDER — LISINOPRIL AND HYDROCHLOROTHIAZIDE 20; 12.5 MG/1; MG/1
TABLET ORAL
Refills: 0 | COMMUNITY
Start: 2018-12-16 | End: 2019-06-10 | Stop reason: DRUGHIGH

## 2019-01-22 RX ORDER — PEN NEEDLE, DIABETIC 31 GX5/16"
NEEDLE, DISPOSABLE MISCELLANEOUS
Refills: 0 | COMMUNITY
Start: 2018-12-16

## 2019-01-22 ASSESSMENT — ENCOUNTER SYMPTOMS: SHORTNESS OF BREATH: 0

## 2019-03-22 RX ORDER — LISINOPRIL AND HYDROCHLOROTHIAZIDE 20; 12.5 MG/1; MG/1
TABLET ORAL
Qty: 30 TABLET | Refills: 3 | Status: SHIPPED | OUTPATIENT
Start: 2019-03-22 | End: 2019-06-10 | Stop reason: DRUGHIGH

## 2019-04-07 ENCOUNTER — HOSPITAL ENCOUNTER (EMERGENCY)
Age: 39
Discharge: HOME | End: 2019-04-07
Attending: EMERGENCY MEDICINE
Payer: MEDICARE

## 2019-04-07 VITALS
SYSTOLIC BLOOD PRESSURE: 167 MMHG | DIASTOLIC BLOOD PRESSURE: 101 MMHG | RESPIRATION RATE: 23 BRPM | TEMPERATURE: 98.5 F | OXYGEN SATURATION: 95 % | BODY MASS INDEX: 47.44 KG/M2 | HEART RATE: 106 BPM | WEIGHT: 313 LBS | HEIGHT: 68 IN

## 2019-04-07 DIAGNOSIS — E11.9 TYPE 2 DIABETES MELLITUS WITHOUT COMPLICATION, WITH LONG-TERM CURRENT USE OF INSULIN (HCC): ICD-10-CM

## 2019-04-07 DIAGNOSIS — Z79.4 TYPE 2 DIABETES MELLITUS WITHOUT COMPLICATION, WITH LONG-TERM CURRENT USE OF INSULIN (HCC): ICD-10-CM

## 2019-04-07 DIAGNOSIS — E11.9 TYPE 2 DIABETES MELLITUS WITHOUT COMPLICATION, WITHOUT LONG-TERM CURRENT USE OF INSULIN (HCC): ICD-10-CM

## 2019-04-07 DIAGNOSIS — R73.9 HYPERGLYCEMIA: Primary | ICD-10-CM

## 2019-04-07 LAB
ABSOLUTE EOS #: 0.1 K/UL (ref 0–0.44)
ABSOLUTE IMMATURE GRANULOCYTE: 0.05 K/UL (ref 0–0.3)
ABSOLUTE LYMPH #: 2.05 K/UL (ref 1.1–3.7)
ABSOLUTE MONO #: 0.78 K/UL (ref 0.1–1.2)
ALBUMIN SERPL-MCNC: 4.2 G/DL (ref 3.5–5.2)
ALBUMIN/GLOBULIN RATIO: 1.2 (ref 1–2.5)
ALLEN TEST: ABNORMAL
ALP BLD-CCNC: 100 U/L (ref 40–129)
ALT SERPL-CCNC: 98 U/L (ref 5–41)
ANION GAP SERPL CALCULATED.3IONS-SCNC: 14 MMOL/L (ref 9–17)
ANION GAP: 10 MMOL/L (ref 7–16)
AST SERPL-CCNC: 71 U/L
BASOPHILS # BLD: 0 % (ref 0–2)
BASOPHILS ABSOLUTE: 0.03 K/UL (ref 0–0.2)
BETA-HYDROXYBUTYRATE: 0.23 MMOL/L (ref 0.02–0.27)
BILIRUB SERPL-MCNC: 0.47 MG/DL (ref 0.3–1.2)
BILIRUBIN URINE: NEGATIVE
BUN BLDV-MCNC: 7 MG/DL (ref 6–20)
BUN/CREAT BLD: ABNORMAL (ref 9–20)
CALCIUM SERPL-MCNC: 9.7 MG/DL (ref 8.6–10.4)
CHLORIDE BLD-SCNC: 97 MMOL/L (ref 98–107)
CO2: 21 MMOL/L (ref 20–31)
COLOR: YELLOW
COMMENT UA: ABNORMAL
CREAT SERPL-MCNC: 0.85 MG/DL (ref 0.7–1.2)
DIFFERENTIAL TYPE: ABNORMAL
EOSINOPHILS RELATIVE PERCENT: 1 % (ref 1–4)
FIO2: ABNORMAL
GFR AFRICAN AMERICAN: >60 ML/MIN
GFR NON-AFRICAN AMERICAN: >60 ML/MIN
GFR NON-AFRICAN AMERICAN: >60 ML/MIN
GFR SERPL CREATININE-BSD FRML MDRD: >60 ML/MIN
GFR SERPL CREATININE-BSD FRML MDRD: ABNORMAL ML/MIN/{1.73_M2}
GFR SERPL CREATININE-BSD FRML MDRD: ABNORMAL ML/MIN/{1.73_M2}
GFR SERPL CREATININE-BSD FRML MDRD: NORMAL ML/MIN/{1.73_M2}
GLUCOSE BLD-MCNC: 413 MG/DL (ref 75–110)
GLUCOSE BLD-MCNC: 451 MG/DL (ref 75–110)
GLUCOSE BLD-MCNC: 473 MG/DL (ref 70–99)
GLUCOSE BLD-MCNC: 484 MG/DL (ref 75–110)
GLUCOSE BLD-MCNC: 488 MG/DL (ref 74–100)
GLUCOSE URINE: ABNORMAL
HCO3 VENOUS: 27.5 MMOL/L (ref 22–29)
HCT VFR BLD CALC: 49.2 % (ref 40.7–50.3)
HEMOGLOBIN: 16.8 G/DL (ref 13–17)
IMMATURE GRANULOCYTES: 1 %
KETONES, URINE: NEGATIVE
LEUKOCYTE ESTERASE, URINE: NEGATIVE
LIPASE: 39 U/L (ref 13–60)
LYMPHOCYTES # BLD: 23 % (ref 24–43)
MCH RBC QN AUTO: 30.9 PG (ref 25.2–33.5)
MCHC RBC AUTO-ENTMCNC: 34.1 G/DL (ref 28.4–34.8)
MCV RBC AUTO: 90.4 FL (ref 82.6–102.9)
MODE: ABNORMAL
MONOCYTES # BLD: 9 % (ref 3–12)
NEGATIVE BASE EXCESS, VEN: ABNORMAL (ref 0–2)
NITRITE, URINE: NEGATIVE
NRBC AUTOMATED: 0 PER 100 WBC
O2 DEVICE/FLOW/%: ABNORMAL
O2 SAT, VEN: 89 % (ref 60–85)
PATIENT TEMP: ABNORMAL
PCO2, VEN: 42.5 MM HG (ref 41–51)
PDW BLD-RTO: 11.8 % (ref 11.8–14.4)
PH UA: 5 (ref 5–8)
PH VENOUS: 7.42 (ref 7.32–7.43)
PLATELET # BLD: 252 K/UL (ref 138–453)
PLATELET ESTIMATE: ABNORMAL
PMV BLD AUTO: 11.7 FL (ref 8.1–13.5)
PO2, VEN: 55.3 MM HG (ref 30–50)
POC CHLORIDE: 99 MMOL/L (ref 98–107)
POC CREATININE: 0.91 MG/DL (ref 0.51–1.19)
POC HEMATOCRIT: 53 % (ref 41–53)
POC HEMOGLOBIN: 18.2 G/DL (ref 13.5–17.5)
POC IONIZED CALCIUM: 1.24 MMOL/L (ref 1.15–1.33)
POC LACTIC ACID: 1.95 MMOL/L (ref 0.56–1.39)
POC PCO2 TEMP: ABNORMAL MM HG
POC PH TEMP: ABNORMAL
POC PO2 TEMP: ABNORMAL MM HG
POC POTASSIUM: 3.7 MMOL/L (ref 3.5–4.5)
POC SODIUM: 136 MMOL/L (ref 138–146)
POSITIVE BASE EXCESS, VEN: 2 (ref 0–3)
POTASSIUM SERPL-SCNC: 3.9 MMOL/L (ref 3.7–5.3)
PROTEIN UA: NEGATIVE
RBC # BLD: 5.44 M/UL (ref 4.21–5.77)
RBC # BLD: ABNORMAL 10*6/UL
SAMPLE SITE: ABNORMAL
SEG NEUTROPHILS: 66 % (ref 36–65)
SEGMENTED NEUTROPHILS ABSOLUTE COUNT: 6 K/UL (ref 1.5–8.1)
SERUM OSMOLALITY: 305 MOSM/KG (ref 275–295)
SODIUM BLD-SCNC: 132 MMOL/L (ref 135–144)
SPECIFIC GRAVITY UA: 1.04 (ref 1–1.03)
TOTAL CO2, VENOUS: 29 MMOL/L (ref 23–30)
TOTAL PROTEIN: 7.7 G/DL (ref 6.4–8.3)
TURBIDITY: CLEAR
URINE HGB: NEGATIVE
UROBILINOGEN, URINE: NORMAL
WBC # BLD: 9 K/UL (ref 3.5–11.3)
WBC # BLD: ABNORMAL 10*3/UL

## 2019-04-07 PROCEDURE — 83930 ASSAY OF BLOOD OSMOLALITY: CPT

## 2019-04-07 PROCEDURE — 81003 URINALYSIS AUTO W/O SCOPE: CPT

## 2019-04-07 PROCEDURE — 85014 HEMATOCRIT: CPT

## 2019-04-07 PROCEDURE — 82565 ASSAY OF CREATININE: CPT

## 2019-04-07 PROCEDURE — 6370000000 HC RX 637 (ALT 250 FOR IP): Performed by: EMERGENCY MEDICINE

## 2019-04-07 PROCEDURE — 83690 ASSAY OF LIPASE: CPT

## 2019-04-07 PROCEDURE — 99284 EMERGENCY DEPT VISIT MOD MDM: CPT

## 2019-04-07 PROCEDURE — 85025 COMPLETE CBC W/AUTO DIFF WBC: CPT

## 2019-04-07 PROCEDURE — 84132 ASSAY OF SERUM POTASSIUM: CPT

## 2019-04-07 PROCEDURE — 82947 ASSAY GLUCOSE BLOOD QUANT: CPT

## 2019-04-07 PROCEDURE — 83605 ASSAY OF LACTIC ACID: CPT

## 2019-04-07 PROCEDURE — 82435 ASSAY OF BLOOD CHLORIDE: CPT

## 2019-04-07 PROCEDURE — 2580000003 HC RX 258: Performed by: EMERGENCY MEDICINE

## 2019-04-07 PROCEDURE — 6360000002 HC RX W HCPCS: Performed by: EMERGENCY MEDICINE

## 2019-04-07 PROCEDURE — 96372 THER/PROPH/DIAG INJ SC/IM: CPT

## 2019-04-07 PROCEDURE — 80053 COMPREHEN METABOLIC PANEL: CPT

## 2019-04-07 PROCEDURE — 84295 ASSAY OF SERUM SODIUM: CPT

## 2019-04-07 PROCEDURE — 82803 BLOOD GASES ANY COMBINATION: CPT

## 2019-04-07 PROCEDURE — 82010 KETONE BODYS QUAN: CPT

## 2019-04-07 PROCEDURE — 82330 ASSAY OF CALCIUM: CPT

## 2019-04-07 RX ORDER — 0.9 % SODIUM CHLORIDE 0.9 %
1000 INTRAVENOUS SOLUTION INTRAVENOUS ONCE
Status: COMPLETED | OUTPATIENT
Start: 2019-04-07 | End: 2019-04-07

## 2019-04-07 RX ORDER — DICYCLOMINE HYDROCHLORIDE 10 MG/1
10 CAPSULE ORAL EVERY 6 HOURS PRN
Qty: 20 CAPSULE | Refills: 0 | Status: SHIPPED | OUTPATIENT
Start: 2019-04-07 | End: 2019-11-04

## 2019-04-07 RX ORDER — DICYCLOMINE HYDROCHLORIDE 10 MG/ML
20 INJECTION INTRAMUSCULAR ONCE
Status: COMPLETED | OUTPATIENT
Start: 2019-04-07 | End: 2019-04-07

## 2019-04-07 RX ADMIN — DICYCLOMINE HYDROCHLORIDE 20 MG: 10 INJECTION INTRAMUSCULAR at 18:03

## 2019-04-07 RX ADMIN — INSULIN LISPRO 6 UNITS: 100 INJECTION, SOLUTION INTRAVENOUS; SUBCUTANEOUS at 18:16

## 2019-04-07 RX ADMIN — SODIUM CHLORIDE 1000 ML: 9 INJECTION, SOLUTION INTRAVENOUS at 17:35

## 2019-04-07 ASSESSMENT — PAIN DESCRIPTION - DESCRIPTORS: DESCRIPTORS: CRAMPING

## 2019-04-07 ASSESSMENT — PAIN DESCRIPTION - FREQUENCY: FREQUENCY: CONTINUOUS

## 2019-04-07 ASSESSMENT — PAIN DESCRIPTION - ORIENTATION: ORIENTATION: RIGHT;LOWER

## 2019-04-07 ASSESSMENT — PAIN SCALES - GENERAL: PAINLEVEL_OUTOF10: 7

## 2019-04-07 ASSESSMENT — PAIN DESCRIPTION - LOCATION: LOCATION: ABDOMEN

## 2019-04-07 NOTE — ED PROVIDER NOTES
[facility] ED  Emergency Department Encounter  Emergency Medicine Resident Note     Pt Name: Elisa Drake  MRN: [id number]  [place] 1980   Date of evaluation: 4/7/2019  PCP:  Evette Galicia       Chief Complaint   Patient presents with    Hyperglycemia     out of medications for 2 weeks       HISTORY OF PRESENT ILLNESS  (Location/Symptom, Timing/Onset, Context/Setting, Quality, Duration, Modifying Factors, Severity.)      Elisa Drake is a 44 y.o. male who presents with concern for hyperglycemia. States that he's been out of his diabetic medications for approximately 2 weeks. He states he currently takes something that starts in the G. Has had his other medications and has been taking those as prescribed. States that he has a sensation of a knot in his stomach and cramping sensation. Also had multiple loose watery stools. No blood in his stools. Some nausea without vomiting. Also has had polyuria and polydipsia. Has had no fevers or chills. No new chest pain or shortness of breath. Patient denies any reflux like symptoms. Denies any history of alcohol or drug use. PAST MEDICAL / SURGICAL / SOCIAL / FAMILY HISTORY     Past Medical History:  has a past medical history of Diabetes mellitus ([address] Erectile dysfunction, Hypertension, Morbid obesity ([address] and Type 2 diabetes mellitus without complication ([address] Past Surgical History:  has a past surgical history that includes Tonsillectomy and pre-malignant / benign skin lesion excision (Right, 08/22/2017). Allergies:  Patient has no known allergies. Home Meds:   Prior to Visit Medications    Medication Sig Taking?  Authorizing Provider   metFORMIN (GLUCOPHAGE) 1000 MG tablet Take 1 tablet by mouth 2 times daily (with meals) Yes Silvana Quick MD   dicyclomine (BENTYL) 10 MG capsule Take 1 capsule by mouth every 6 hours as needed (cramps) Yes Silvana Quick MD lisinopril-hydrochlorothiazide (PRINZIDE;ZESTORETIC) 20-12.5 MG per tablet take 1 tablet by mouth once daily  Jas Becker MD   B-D UF III MINI PEN NEEDLES 31G X 5 MM MISC USE ONCE DAILY  Historical Provider, MD   lisinopril-hydrochlorothiazide (PRINZIDE;ZESTORETIC) 20-12.5 MG per tablet   Historical Provider, MD   ibuprofen (ADVIL;MOTRIN) 800 MG tablet Take 1 tablet by mouth every 6 hours as needed for Pain  Jas Becker MD   JANUVIA 100 MG tablet take 1 tablet by mouth once daily  Lisa Ortiz MD   benzocaine (ORAJEL) 10 % mucosal gel Take by mouth as needed. Suri Hebert MD   lisinopril-hydrochlorothiazide (PRINZIDE;ZESTORETIC) 20-25 MG per tablet take 1 tablet by mouth once daily  Dimas Barahona MD   insulin glargine (LANTUS SOLOSTAR) 100 UNIT/ML injection pen Inject 10 Units into the skin nightly  Mary Ann Justin MD   Insulin Pen Needle 31G X 6 MM MISC 1 each by Does not apply route daily  Mary Ann Justin MD   Glucose Blood (BLOOD GLUCOSE TEST STRIPS) STRP 1 each by In Vitro route 3 times daily  Khoa Mendez MD   SOFT TOUCH LANCETS MISC 1 each by Does not apply route 3 times daily  Khoa Mendez MD   glucose monitoring kit (FREESTYLE) monitoring kit Check blood sugar three times daily  Mary Ann Justin MD   Alcohol Swabs (ALCOHOL PREP) 70 % PADS 1 each by Does not apply route 3 times daily  Mary Ann Justin MD     Please note that medications prescribed at discharge will auto-populate into this medication list when note is refreshed. Please look at prescription date andprescriber to clarify. Family History:family history includes Diabetes in his mother; Heart Disease in his mother; High Blood Pressure in his mother; High Cholesterol in his mother. Social History: He reports that he has never smoked. He has never used smokeless tobacco. He reports that he does not drink alcohol or use drugs. He has no sexual activity history on file.     REVIEW OF SYSTEMS    (2-9 systems for level 4, 10 or behavior is normal. Judgment and thought content normal.   Nursing note and vitals reviewed. DIFFERENTIAL DIAGNOSIS/IMPRESSION     DDX: Hyperglycemia, DKA, HHS, medication noncompliance, osmotic diarrhea, infectious diarrhea, UTI, hepatitis test, gastritis, gastric enteritis    Impression: 44 y.o. male who presents with concerns for hyperglycemia as well as abdominal discomfort and diarrhea. Patient has not been to use medications. Patient per chart review he is on metformin, and provided a prescription as a generic last time under the name Glucophage which is why patient thought he was no longer taking metformin. He has not been checking his sugars at home. They are slightly elevated at 400. We'll check a point of care to make sure that he is not acidotic. Patient's abdominal exam is benign and he likely has osmotic diarrhea from the hyperglycemia as well as polyuria and polydipsia from hyperglycemia. We'll check basic labs and IV fluid bolus. We'll give a one-time dose of insulin. If workup is negative plan for discharge with prescription for metformin     DIAGNOSTIC RESULTS     EKG: All EKG's are interpreted by the Emergency Department Physician who either signs or Co-signs this chart in the absence of a cardiologist.    Not clinically indicated at this time.       LABS: Labswere reviewed by me and abnormal results are displayed above     Labs Reviewed   OSMOLALITY - Abnormal; Notable for the following components:       Result Value    Serum Osmolality 305 (*)     All other components within normal limits   COMPREHENSIVE METABOLIC PANEL - Abnormal; Notable for the following components:    Glucose 473 (*)     Sodium 132 (*)     Chloride 97 (*)     ALT 98 (*)     AST 71 (*)     All other components within normal limits   CBC WITH AUTO DIFFERENTIAL - Abnormal; Notable for the following components:    Seg Neutrophils 66 (*)     Lymphocytes 23 (*)     Immature Granulocytes 1 (*)     All other components within normal limits   URINE RT REFLEX TO CULTURE - Abnormal; Notable for the following components:    Glucose, Ur 3+ (*)     Specific Gravity, UA 1.039 (*)     All other components within normal limits   HGB/HCT - Abnormal; Notable for the following components:    POC Hemoglobin 18.2 (*)     All other components within normal limits   SODIUM (POC) - Abnormal; Notable for the following components:    POC Sodium 136 (*)     All other components within normal limits   POC GLUCOSE FINGERSTICK - Abnormal; Notable for the following components:    POC Glucose 484 (*)     All other components within normal limits   VENOUS BLOOD GAS, POINT OF CARE - Abnormal; Notable for the following components:    pO2, Dashawn 55.3 (*)     O2 Sat, Dashawn 89 (*)     All other components within normal limits   LACTIC ACID,POINT OF CARE - Abnormal; Notable for the following components:    POC Lactic Acid 1.95 (*)     All other components within normal limits   POCT GLUCOSE - Abnormal; Notable for the following components:    POC Glucose 488 (*)     All other components within normal limits   POC GLUCOSE FINGERSTICK - Abnormal; Notable for the following components:    POC Glucose 413 (*)     All other components within normal limits   POC GLUCOSE FINGERSTICK - Abnormal; Notable for the following components:    POC Glucose 451 (*)     All other components within normal limits   BETA-HYDROXYBUTYRATE   LIPASE   POTASSIUM (POC)   CHLORIDE (POC)   CALCIUM, IONIC (POC)   POCT GLUCOSE   POC BLOOD GAS AND CHEMISTRY   POCT GLUCOSE   CREATININE W/GFR POINT OF CARE   ANION GAP (CALC) POC       RADIOLOGY: All plain film, CT, MRI, and formal ultrasound images (except ED bedside ultrasound) are read by the radiologist, see reports below, unless otherwise noted in ED Course, MDM or here. No results found. BEDSIDE ULTRASOUND:  Not clinically indicated at this time.       ED COURSE      ED Medication Orders (From admission, onward)    Start Ordered     Status Ordering Provider    04/07/19 1745 04/07/19 1741  dicyclomine (BENTYL) injection 20 mg  ONCE      Last MAR action:  Given - by Lindsey Ochoa on 04/07/19 at [address] [facility] E    04/07/19 1715 04/07/19 1710  0.9 % sodium chloride bolus  ONCE      Last MAR action:  Stopped - by Lui Durham on 04/07/19 at [facility] E    04/07/19 1715 04/07/19 1710  insulin lispro (HUMALOG) injection vial 6 Units  ONCE      Last MAR action:  Given - by Lindsey Ochoa on 04/07/19 at [facility]          EMERGENCYDEPARTMENT COURSE:  ED Course as of Apr 08 0249   Sun Apr 07, 2019 1727 POC labs show a pH of 7.418. With no anion gap. [CK]      ED Course User Index  [CK] Billy Valerio MD     Patient lab work was unremarkable at this time. Discharged with a prescription for metformin and return precautions. PROCEDURES:  None    CONSULTS:  None    CRITICAL CARE:  0 mins not including procedure time, please also see attending note    FINAL IMPRESSION      1. Hyperglycemia    2. Type 2 diabetes mellitus without complication, without long-term current use of insulin [address] [address]    3.  Type 2 diabetes mellitus without complication, with long-term current use of insulin ([facility] [address] [facility]        DISPOSITION / PLAN     DISPOSITION        PATIENT REFERRED TO:  Anthony Arriola  [address] [facility] [place] [address]    Schedule an appointment as soon as possible for a visit in 1 week      [facility] ED  [address] [facility] [place] [address] [phone]  Go to   As needed, If symptoms worsen      DISCHARGE MEDICATIONS:  Discharge Medication List as of 4/7/2019  7:19 PM      START taking these medications    Details   dicyclomine (BENTYL) 10 MG capsule Take 1 capsule by mouth every 6 hours as needed (cramps), Disp-20 capsule, R-0Print             Billy Valerio MD  Emergency Medicine Resident      (Please note that portions of this note were completed with a voice recognition program.  Efforts were made to edit the dictations but occasionallywords are mis-transcribed.)         Sofy Weber MD  Resident  04/08/19 0636

## 2019-04-07 NOTE — ED PROVIDER NOTES
[facility]     Emergency Department     Faculty Note/ Attestation      Pt Name: Shanell Argueta                                       MRN: [id number]  [place] 1980  Date of evaluation: 4/7/2019    Patients PCP:    Connor Aparicio MD    Attestation  I performed a history and physical examination of the patient and discussed management with the resident. I reviewed the residents note and agree with the documented findings and plan of care. Any areas of disagreement are noted on the chart. I was personally present for the key portions of any procedures. I have documented in the chart those procedures where I was not present during the key portions. I have reviewed the emergency nurses triage note. I agree with the chief complaint, past medical history, past surgical history, allergies, medications, social and family history as documented unless otherwise noted below. For Physician Assistant/ Nurse Practitioner cases/documentation I have personally evaluated this patient and have completed at least one if not all key elements of the E/M (history, physical exam, and MDM). Additional findings are as noted. Initial Screens:             Vitals:    Vitals:    04/07/19 1707   BP: (!) 167/101   Pulse: 115   Resp: 19   Temp: 98.5 °F (36.9 °C)   TempSrc: Oral   SpO2: 95%       CHIEF COMPLAINT       Chief Complaint   Patient presents with    Hyperglycemia     out of medications for 2 weeks       The pt has not been taking meds for 2 weeks the pt has been having some epigastric pains and diarrhea.       DIAGNOSTIC RESULTS     RADIOLOGY:   No orders to display       LABS:  Labs Reviewed   OSMOLALITY   COMPREHENSIVE METABOLIC PANEL   CBC WITH AUTO DIFFERENTIAL   BETA-HYDROXYBUTYRATE   LIPASE   URINE RT REFLEX TO CULTURE   POCT GLUCOSE   POC BLOOD GAS AND CHEMISTRY   POCT GLUCOSE       EMERGENCY DEPARTMENT COURSE:     -------------------------  BP: (!) 167/101, Temp: 98.5 °F (36.9 °C), Pulse: 115, Resp: 19  Physical Exam __  Constitutional:  oriented to person, place, and time. appears well-developed and well-nourished. HENT: no facial swelling or edema  Head: Normocephalic and atraumatic. Eyes: Right eye exhibits no discharge. Left eye exhibits no discharge. No scleral icterus. Neck: No JVD present. No tracheal deviation present. Cardiovascular: pulses present in extremities  Pulmonary/Chest: Effort normal. No respiratory distress. Musculoskeletal: Normal range of motion. exhibits no edema. Neurological: they are alert and oriented to person, place, and time. Skin: Skin is warm and dry. Psychiatric: has a normal mood and affect. behavior is normal.      Comments  The pt presents for chief complaint of hyperglycemia. The patients complaint could be concerning for:  Diabetic Ketoacidosis  Hypertonic Hyperosmolar Nonketotic state  Hypterglycemia  Underlying stress to medical condition    Based on the patients history and physical the patient is unlikely to suffer from [facility] or dka. The pt hyperglycemia will be treated will assess for UTI no other signs of infection      Nelson DO, RDMS.   Attending Emergency Physician          Colonel Muriel DO  04/07/19 8079

## 2019-04-07 NOTE — ED NOTES
Glucose 473 call from lab, resident notified.      Madhuri Diaz RN  04/07/19 101 Dates ADRIAN Mari  04/07/19 8105

## 2019-04-08 ASSESSMENT — ENCOUNTER SYMPTOMS
DIARRHEA: 1
CHEST TIGHTNESS: 0
CONSTIPATION: 0
NAUSEA: 0
SINUS PAIN: 0
ABDOMINAL PAIN: 1
VOMITING: 0
RHINORRHEA: 0
SORE THROAT: 0
EYE PAIN: 0
SHORTNESS OF BREATH: 0
COUGH: 0
BACK PAIN: 0

## 2019-05-15 ENCOUNTER — HOSPITAL ENCOUNTER (EMERGENCY)
Age: 39
Discharge: HOME | End: 2019-05-15
Attending: EMERGENCY MEDICINE
Payer: COMMERCIAL

## 2019-05-15 VITALS
OXYGEN SATURATION: 96 % | HEIGHT: 68 IN | WEIGHT: 310 LBS | HEART RATE: 85 BPM | SYSTOLIC BLOOD PRESSURE: 165 MMHG | BODY MASS INDEX: 46.98 KG/M2 | DIASTOLIC BLOOD PRESSURE: 101 MMHG | TEMPERATURE: 98.4 F

## 2019-05-15 DIAGNOSIS — K08.89 PAIN, DENTAL: Primary | ICD-10-CM

## 2019-05-15 PROCEDURE — G0381 LEV 2 HOSP TYPE B ED VISIT: HCPCS

## 2019-05-15 RX ORDER — PENICILLIN V POTASSIUM 250 MG/1
500 TABLET ORAL ONCE
Status: DISCONTINUED | OUTPATIENT
Start: 2019-05-15 | End: 2019-05-15 | Stop reason: HOSPADM

## 2019-05-15 RX ORDER — PENICILLIN V POTASSIUM 500 MG/1
500 TABLET ORAL 4 TIMES DAILY
Qty: 28 TABLET | Refills: 0 | Status: SHIPPED | OUTPATIENT
Start: 2019-05-15 | End: 2019-05-22

## 2019-05-15 RX ORDER — ACETAMINOPHEN 325 MG/1
650 TABLET ORAL ONCE
Status: DISCONTINUED | OUTPATIENT
Start: 2019-05-15 | End: 2019-05-15 | Stop reason: HOSPADM

## 2019-05-15 RX ORDER — ACETAMINOPHEN 325 MG/1
650 TABLET ORAL EVERY 6 HOURS PRN
Qty: 30 TABLET | Refills: 0 | Status: SHIPPED | OUTPATIENT
Start: 2019-05-15 | End: 2019-11-04

## 2019-05-15 RX ORDER — IBUPROFEN 800 MG/1
800 TABLET ORAL EVERY 8 HOURS PRN
Qty: 20 TABLET | Refills: 0 | Status: SHIPPED | OUTPATIENT
Start: 2019-05-15 | End: 2019-06-10 | Stop reason: SDUPTHER

## 2019-05-15 ASSESSMENT — ENCOUNTER SYMPTOMS
EYE ITCHING: 0
SORE THROAT: 0
COUGH: 0
BACK PAIN: 0
VOMITING: 0
NAUSEA: 0
WHEEZING: 0
SHORTNESS OF BREATH: 0
EYE PAIN: 0
EYE DISCHARGE: 0
RHINORRHEA: 0

## 2019-05-15 ASSESSMENT — PAIN SCALES - GENERAL: PAINLEVEL_OUTOF10: 9

## 2019-05-15 NOTE — ED PROVIDER NOTES
I performed a history and physical examination of the patient and discussed management with the resident. I reviewed the residents note and agree with the documented findings and plan of care. Any areas of disagreement are noted on the chart. I was personally present for the key portions of any procedures. I have documented in the chart those procedures where I was not present during the key portions. I have reviewed the emergency nurses triage note. I agree with the chief complaint, past medical history, past surgical history, allergies, medications, social and family history as documented unless otherwise noted below. Documentation of the HPI, Physical Exam and Medical Decision Making performed by medical students or scribes is based on my personal performance of the HPI, PE and MDM. For Phys Assistant/ Nurse Practitioner cases/documentation I have personally evaluated this patient and have completed at least one if not all key elements of the E/M (history, physical exam, and MDM). I find the patient's history and physical exam are consistent with the NP/PA documentation. I agree with the care provided, treatment rendered, disposition and followup plan. Additional findings are as noted. Lolita Viramontes. Nacho Rojas MD  Attending Emergency  Physician    C/O LEFT UPPER TOOTHACHE FOR PAST WEEK. NO SWELLING, FEVER, CHILLS. AWAKE, ALERT, COOP, RESP. ORAL CAV-CARIOUS, TENDER LEFT MAX SECOND PREMOLAR, FIRST MOLAR. MULT OTHER CARIOUS TEETH. NO LYMPHADENOPATHY, SWELLING, PURULENCE, FLUCTUANCE. IMP-DENTAL CARIES. PLAN-DISCHARGE, CALL [facility] ASAP TO ARRANGE APPT. RX PCN, IBUPROFEN. RETURN IF SX WORSEN, PROGRESS.           Anne Paredes MD  05/15/19 1945

## 2019-05-15 NOTE — ED TRIAGE NOTES
Pt c/o left upper molar pain x 1 week. \"I just need antibiotics. \"    Dental caries noted, afebrile

## 2019-05-15 NOTE — ED PROVIDER NOTES
[facility]  ED  Emergency Department Encounter  Advanced Practice Provider     Pt Name: Miguel Daniel  MRN: [id number]  [place] 1980  Date of evaluation: 5/15/19  PCP:  Evette Galicia       Chief Complaint   Patient presents with    Dental Pain       HISTORY OF PRESENT ILLNESS  (Location/Symptom, Timing/Onset,Context/Setting, Quality, Duration, Modifying Factors, Severity.)      Miguel Daniel is a 44 y.o. male who presents with dental pain. Patient states that this dental pain has been present for several months intermittently. Patient has followed up with the dental center. He states that he does not wish to return there and instead wishes to go to [facility].  He did take ibuprofen earlier today and about one hour prior to arrival, 800 mg. He states that it does help with this pain. No difficulty breathing or swallowing. He states that he has had known dental caries and fractures in the left upper dentition for several months. PAST MEDICAL /SURGICAL / SOCIAL / FAMILY HISTORY      has a past medical history of Diabetes mellitus ([facility] [address] Erectile dysfunction, Hypertension, Morbid obesity ([facility] [address] and Type 2 diabetes mellitus without complication ([facility] [address] has a past surgical history that includes Tonsillectomy and pre-malignant / benign skin lesion excision (Right, 08/22/2017).     Social History     Socioeconomic History    Marital status: Single     Spouse name: Not on file    Number of children: Not on file    Years of education: Not on file    Highest education level: Not on file   Occupational History    Not on file   Social Needs    Financial resource strain: Not on file    Food insecurity:     Worry: Not on file     Inability: Not on file    Transportation needs:     Medical: Not on file     Non-medical: Not on file   Tobacco Use    Smoking status: Never Smoker    Smokeless tobacco: Never Used   Substance and Sexual Activity    Alcohol use: No    Drug use: No    Sexual activity: Not on file   Lifestyle    Physical activity:     Days per week: Not on file     Minutes per session: Not on file    Stress: Not on file   Relationships    Social connections:     Talks on phone: Not on file     Gets together: Not on file     Attends [employer] service: Not on file     Active member of club or organization: Not on file     Attends meetings of clubs or organizations: Not on file     Relationship status: Not on file    Intimate partner violence:     Fear of current or ex partner: Not on file     Emotionally abused: Not on file     Physically abused: Not on file     Forced sexual activity: Not on file   Other Topics Concern    Not on file   Social History Narrative    Not on file       Family History   Problem Relation Age of Onset    Diabetes Mother     High Cholesterol Mother     High Blood Pressure Mother     Heart Disease Mother        Allergies:  Patient has no known allergies. Home Medications:  Prior to Admission medications    Medication Sig Start Date End Date Taking?  Authorizing Provider   penicillin v potassium (VEETID) 500 MG tablet Take 1 tablet by mouth 4 times daily for 7 days 5/15/19 5/22/19 Yes Javier Molina PA-C   ibuprofen (ADVIL;MOTRIN) 800 MG tablet Take 1 tablet by mouth every 8 hours as needed for Pain 5/15/19  Yes Javier Molina PA-C   acetaminophen (TYLENOL) 325 MG tablet Take 2 tablets by mouth every 6 hours as needed for Pain 5/15/19  Yes Javier Molina PA-C   metFORMIN (GLUCOPHAGE) 1000 MG tablet Take 1 tablet by mouth 2 times daily (with meals) 4/7/19   Isabella Hansen MD   dicyclomine (BENTYL) 10 MG capsule Take 1 capsule by mouth every 6 hours as needed (cramps) 4/7/19   Isabella Hansen MD   lisinopril-hydrochlorothiazide (PRINZIDE;ZESTORETIC) 20-12.5 MG per tablet take 1 tablet by mouth once daily 3/22/19   Jaciel Gonzalez MD   B-D UF III MINI PEN NEEDLES 31G X 5 MM MISC USE ONCE DAILY 12/16/18   Historical 98.4 °F (36.9 °C). His blood pressure is 165/101 (abnormal) and his pulse is 85. His oxygen saturation is 96%. Physical Exam   Constitutional: He is oriented to person, place, and time. He appears well-developed and well-nourished. HENT:   Head: Normocephalic and atraumatic. Right Ear: External ear normal.   Left Ear: External ear normal.   Mouth/Throat:       Eyes: Right eye exhibits no discharge. Left eye exhibits no discharge. No scleral icterus. Neck: No tracheal deviation present. Pulmonary/Chest: Effort normal. No stridor. No respiratory distress. Musculoskeletal: Normal range of motion. He exhibits no edema. Neurological: He is alert and oriented to person, place, and time. Coordination normal.   Skin: Skin is warm and dry. He is not diaphoretic. Psychiatric: He has a normal mood and affect. His behavior is normal.       DIFFERENTIAL  DIAGNOSIS       Dental caries, abscess    PLAN (LABS / IMAGING / EKG):  No orders of the defined types were placed in this encounter. MEDICATIONS ORDERED:  Orders Placed This Encounter   Medications    penicillin v potassium (VEETID) tablet 500 mg    acetaminophen (TYLENOL) tablet 650 mg    penicillin v potassium (VEETID) 500 MG tablet     Sig: Take 1 tablet by mouth 4 times daily for 7 days     Dispense:  28 tablet     Refill:  0    ibuprofen (ADVIL;MOTRIN) 800 MG tablet     Sig: Take 1 tablet by mouth every 8 hours as needed for Pain     Dispense:  20 tablet     Refill:  0    acetaminophen (TYLENOL) 325 MG tablet     Sig: Take 2 tablets by mouth every 6 hours as needed for Pain     Dispense:  30 tablet     Refill:  0       Controlled Substances Monitoring:      DIAGNOSTIC RESULTS / EMERGENCY DEPARTMENT COURSE / MDM     He is afebrile, no signs of Elias angina. Patient declines dental center appointment.   Will start on antibiotics, have him follow-up with [facility] dental as that is his dentist of choice    RADIOLOGY:   I directly visualized (with the attending physician) the following  imagesand reviewed the radiologist interpretations:  No results found. No orders to display       LABS:  No results found for this visit on 05/15/19. CONSULTS:  None    PROCEDURES:  None    FINAL IMPRESSION      1.  Pain, dental          DISPOSITION / PLAN     DISPOSITION Decision To Discharge    PATIENT REFERRED TO:    with [facility], call tomorrow to schedule follow up          DISCHARGE MEDICATIONS:  Discharge Medication List as of 5/15/2019  5:42 PM      START taking these medications    Details   penicillin v potassium (VEETID) 500 MG tablet Take 1 tablet by mouth 4 times daily for 7 days, Disp-28 tablet, R-0Print      ibuprofen (ADVIL;MOTRIN) 800 MG tablet Take 1 tablet by mouth every 8 hours as needed for Pain, Disp-20 tablet, R-0Print      acetaminophen (TYLENOL) 325 MG tablet Take 2 tablets by mouth every 6 hours as needed for Pain, Disp-30 tablet, R-0Print             Cecilia Alcaraz PA-C   Emergency Medicine Physician Assistant    (Please note that portions of this note were completed with a voice recognition program.  Efforts were made to edit thedictations but occasionally words are mis-transcribed.)        Cecilia Alcaraz PA-C  05/15/19 0790

## 2019-06-06 ENCOUNTER — TELEPHONE (OUTPATIENT)
Dept: FAMILY MEDICINE CLINIC | Age: 39
End: 2019-06-06

## 2019-06-06 NOTE — TELEPHONE ENCOUNTER
Susan Shook From [facility] Pt is overdue for diabetic retinal exam and Hgb A1C. States pt hung up on her. Call placed to pt he was transferred ro scheduling to make appointment.

## 2019-06-09 ENCOUNTER — HOSPITAL ENCOUNTER (EMERGENCY)
Age: 39
Discharge: HOME | End: 2019-06-09
Attending: EMERGENCY MEDICINE
Payer: COMMERCIAL

## 2019-06-09 VITALS
WEIGHT: 303 LBS | HEART RATE: 85 BPM | RESPIRATION RATE: 20 BRPM | TEMPERATURE: 98.1 F | DIASTOLIC BLOOD PRESSURE: 102 MMHG | BODY MASS INDEX: 45.92 KG/M2 | SYSTOLIC BLOOD PRESSURE: 152 MMHG | OXYGEN SATURATION: 95 % | HEIGHT: 68 IN

## 2019-06-09 DIAGNOSIS — R51.9 NONINTRACTABLE HEADACHE, UNSPECIFIED CHRONICITY PATTERN, UNSPECIFIED HEADACHE TYPE: ICD-10-CM

## 2019-06-09 DIAGNOSIS — K04.7 DENTAL INFECTION: Primary | ICD-10-CM

## 2019-06-09 PROCEDURE — 99283 EMERGENCY DEPT VISIT LOW MDM: CPT

## 2019-06-09 PROCEDURE — 6370000000 HC RX 637 (ALT 250 FOR IP): Performed by: EMERGENCY MEDICINE

## 2019-06-09 PROCEDURE — 6360000002 HC RX W HCPCS: Performed by: EMERGENCY MEDICINE

## 2019-06-09 PROCEDURE — 96372 THER/PROPH/DIAG INJ SC/IM: CPT

## 2019-06-09 RX ORDER — PENICILLIN V POTASSIUM 500 MG/1
500 TABLET ORAL 3 TIMES DAILY
Qty: 30 TABLET | Refills: 0 | Status: SHIPPED | OUTPATIENT
Start: 2019-06-09 | End: 2019-06-19

## 2019-06-09 RX ORDER — IBUPROFEN 800 MG/1
800 TABLET ORAL EVERY 8 HOURS PRN
Qty: 30 TABLET | Refills: 0 | Status: SHIPPED | OUTPATIENT
Start: 2019-06-09 | End: 2019-11-04

## 2019-06-09 RX ORDER — KETOROLAC TROMETHAMINE 30 MG/ML
30 INJECTION, SOLUTION INTRAMUSCULAR; INTRAVENOUS ONCE
Status: DISCONTINUED | OUTPATIENT
Start: 2019-06-09 | End: 2019-06-09

## 2019-06-09 RX ORDER — PENICILLIN V POTASSIUM 250 MG/1
500 TABLET ORAL ONCE
Status: COMPLETED | OUTPATIENT
Start: 2019-06-09 | End: 2019-06-09

## 2019-06-09 RX ORDER — OXYCODONE HYDROCHLORIDE AND ACETAMINOPHEN 5; 325 MG/1; MG/1
1 TABLET ORAL EVERY 6 HOURS PRN
Qty: 20 TABLET | Refills: 0 | Status: SHIPPED | OUTPATIENT
Start: 2019-06-09 | End: 2019-06-16

## 2019-06-09 RX ORDER — KETOROLAC TROMETHAMINE 30 MG/ML
30 INJECTION, SOLUTION INTRAMUSCULAR; INTRAVENOUS ONCE
Status: COMPLETED | OUTPATIENT
Start: 2019-06-09 | End: 2019-06-09

## 2019-06-09 RX ADMIN — PENICILLIN V POTASIUM 500 MG: 250 TABLET ORAL at 01:52

## 2019-06-09 RX ADMIN — KETOROLAC TROMETHAMINE 30 MG: 30 INJECTION, SOLUTION INTRAMUSCULAR; INTRAVENOUS at 02:07

## 2019-06-09 ASSESSMENT — PAIN SCALES - GENERAL
PAINLEVEL_OUTOF10: 7
PAINLEVEL_OUTOF10: 7

## 2019-06-09 NOTE — ED PROVIDER NOTES
[address] [place] [address] ED  eMERGENCY dEPARTMENT eNCOUnter      Pt Name: Roly Leahy  MRN: [id number]  [place] 1980  Date of evaluation: 6/9/2019  Provider: Abel Ellison MD    CHIEF COMPLAINT       Chief Complaint   Patient presents with    Headache         HISTORY OF PRESENT ILLNESS  (Location/Symptom, Timing/Onset, Context/Setting, Quality, Duration, Modifying Factors, Severity.)   Roly Leahy is a 44 y.o. male who presents to the emergency department due to dental pain and headache symptoms. Patient has issues with chronic dental infection involving the left upper molar region. He has dental follow-up scheduled. He is not currently on antibiotic coverage. He has had increasing pain to the site. Nursing Notes were reviewed. ALLERGIES     Patient has no known allergies. CURRENT MEDICATIONS       Previous Medications    ACETAMINOPHEN (TYLENOL) 325 MG TABLET    Take 2 tablets by mouth every 6 hours as needed for Pain    ALCOHOL SWABS (ALCOHOL PREP) 70 % PADS    1 each by Does not apply route 3 times daily    B-D UF III MINI PEN NEEDLES 31G X 5 MM MISC    USE ONCE DAILY    BENZOCAINE (ORAJEL) 10 % MUCOSAL GEL    Take by mouth as needed.     DICYCLOMINE (BENTYL) 10 MG CAPSULE    Take 1 capsule by mouth every 6 hours as needed (cramps)    GLUCOSE BLOOD (BLOOD GLUCOSE TEST STRIPS) STRP    1 each by In Vitro route 3 times daily    GLUCOSE MONITORING KIT (FREESTYLE) MONITORING KIT    Check blood sugar three times daily    IBUPROFEN (ADVIL;MOTRIN) 800 MG TABLET    Take 1 tablet by mouth every 8 hours as needed for Pain    INSULIN GLARGINE (LANTUS SOLOSTAR) 100 UNIT/ML INJECTION PEN    Inject 10 Units into the skin nightly    INSULIN PEN NEEDLE 31G X 6 MM MISC    1 each by Does not apply route daily    JANUVIA 100 MG TABLET    take 1 tablet by mouth once daily    LISINOPRIL-HYDROCHLOROTHIAZIDE (PRINZIDE;ZESTORETIC) 20-12.5 MG PER TABLET        LISINOPRIL-HYDROCHLOROTHIAZIDE (PRINZIDE;ZESTORETIC) 20-12.5 MG PER TABLET    take 1 tablet by mouth once daily    LISINOPRIL-HYDROCHLOROTHIAZIDE (PRINZIDE;ZESTORETIC) 20-25 MG PER TABLET    take 1 tablet by mouth once daily    METFORMIN (GLUCOPHAGE) 1000 MG TABLET    Take 1 tablet by mouth 2 times daily (with meals)    SOFT TOUCH LANCETS MISC    1 each by Does not apply route 3 times daily       PAST MEDICAL HISTORY         Diagnosis Date    Diabetes mellitus ([facility]     Erectile dysfunction 8/15/2017    Hypertension     Morbid obesity ([facility] 2016    Type 2 diabetes mellitus without complication ([facility] 6964       SURGICAL HISTORY           Procedure Laterality Date    PRE-MALIGNANT / BENIGN SKIN LESION EXCISION Right 2017    rt scalp lesion removed    TONSILLECTOMY           FAMILY HISTORY           Problem Relation Age of Onset    Diabetes Mother     High Cholesterol Mother     High Blood Pressure Mother     Heart Disease Mother      Family Status   Relation Name Status    Mother  Alive    Father          SOCIAL HISTORY      reports that he has never smoked. He has never used smokeless tobacco. He reports that he does not drink alcohol or use drugs. REVIEW OF SYSTEMS    (2-9 systems for level 4, 10 or more for level 5)     Review of Systems   All other systems reviewed and are negative. Except as noted above the remainder of the review of systems was reviewed and negative. PHYSICAL EXAM    (up to 7 for level 4, 8 or more for level 5)     Vitals:    19 0106 19 0115   BP: (!) 152/102    Pulse: 85    Resp: 20    Temp: 98.1 °F (36.7 °C)    TempSrc: Oral    SpO2: 95%    Weight:  (!) 303 lb (137.4 kg)   Height:  5' 8\" (1.727 m)       Physical exam reflects a well-nourished well-hydrated male. He is afebrile with stable vital signs to include pulse ox of 95% on room air. He is not hypoxic. He is alert conversive and appropriate in behavior. Dentition shows multiple chronic areas of dental decay and caries.   Left upper molar region does demonstrate area of active infection. No focal abscess. No difficulty breathing speaking swallowing. Scattered cervical lymphadenopathy noted. Heart regular rate and rhythm normal S1-S2. Lungs are clear to auscultation. Integument without rash or lesion. No neurovascular deficits are noted      DIAGNOSTIC RESULTS       EMERGENCY DEPARTMENT COURSE and DIFFERENTIAL DIAGNOSIS/MDM:   Vitals:    Vitals:    06/09/19 0106 06/09/19 0115   BP: (!) 152/102    Pulse: 85    Resp: 20    Temp: 98.1 °F (36.7 °C)    TempSrc: Oral    SpO2: 95%    Weight:  (!) 303 lb (137.4 kg)   Height:  5' 8\" (1.727 m)     Patient is evaluated. Headache is secondary to his ongoing issues with dental infection. He is placed on medications for pain inflammation and antibiotic coverage. He will follow-up with his dentist on June 20 as scheduled    CONSULTS:  None    PROCEDURES:  None    FINAL IMPRESSION      1. Dental infection    2. Nonintractable headache, unspecified chronicity pattern, unspecified headache type          DISPOSITION/PLAN   DISPOSITION Decision To Discharge 06/09/2019 01:41:19 AM      PATIENT REFERRED TO:   Amita Malcolm  [address] [place], [place] [id number]          Followup with Dentist on 6/20 as scheduled          [facility] ED  [address] [facility]  [phone]    As needed, If symptoms worsen      DISCHARGE MEDICATIONS:     New Prescriptions    IBUPROFEN (ADVIL;MOTRIN) 800 MG TABLET    Take 1 tablet by mouth every 8 hours as needed for Pain    OXYCODONE-ACETAMINOPHEN (PERCOCET) 5-325 MG PER TABLET    Take 1 tablet by mouth every 6 hours as needed for Pain for up to 7 days.     PENICILLIN V POTASSIUM (VEETID) 500 MG TABLET    Take 1 tablet by mouth 3 times daily for 10 days       Controlled Substance Monitoring:    Acute and Chronic Pain Monitoring:   RX Monitoring 6/9/2019   Periodic Controlled Substance Monitoring No signs of potential drug abuse or diversion

## 2019-06-10 ENCOUNTER — OFFICE VISIT (OUTPATIENT)
Dept: FAMILY MEDICINE CLINIC | Age: 39
End: 2019-06-10
Payer: COMMERCIAL

## 2019-06-10 VITALS
WEIGHT: 304.2 LBS | DIASTOLIC BLOOD PRESSURE: 104 MMHG | BODY MASS INDEX: 46.1 KG/M2 | HEIGHT: 68 IN | SYSTOLIC BLOOD PRESSURE: 143 MMHG | TEMPERATURE: 97.2 F | HEART RATE: 100 BPM

## 2019-06-10 DIAGNOSIS — E11.9 TYPE 2 DIABETES MELLITUS WITHOUT COMPLICATION, WITH LONG-TERM CURRENT USE OF INSULIN (HCC): Primary | ICD-10-CM

## 2019-06-10 DIAGNOSIS — Z79.4 TYPE 2 DIABETES MELLITUS WITHOUT COMPLICATION, WITH LONG-TERM CURRENT USE OF INSULIN (HCC): Primary | ICD-10-CM

## 2019-06-10 DIAGNOSIS — Z00.00 HEALTH CARE MAINTENANCE: ICD-10-CM

## 2019-06-10 DIAGNOSIS — E66.01 MORBID OBESITY WITH BMI OF 45.0-49.9, ADULT (HCC): ICD-10-CM

## 2019-06-10 DIAGNOSIS — I10 ESSENTIAL HYPERTENSION: ICD-10-CM

## 2019-06-10 LAB — HBA1C MFR BLD: 10.8 %

## 2019-06-10 PROCEDURE — 99213 OFFICE O/P EST LOW 20 MIN: CPT | Performed by: FAMILY MEDICINE

## 2019-06-10 PROCEDURE — 1036F TOBACCO NON-USER: CPT | Performed by: FAMILY MEDICINE

## 2019-06-10 PROCEDURE — G8417 CALC BMI ABV UP PARAM F/U: HCPCS | Performed by: FAMILY MEDICINE

## 2019-06-10 PROCEDURE — 83036 HEMOGLOBIN GLYCOSYLATED A1C: CPT | Performed by: FAMILY MEDICINE

## 2019-06-10 PROCEDURE — 3046F HEMOGLOBIN A1C LEVEL >9.0%: CPT | Performed by: FAMILY MEDICINE

## 2019-06-10 PROCEDURE — 2022F DILAT RTA XM EVC RTNOPTHY: CPT | Performed by: FAMILY MEDICINE

## 2019-06-10 PROCEDURE — G8427 DOCREV CUR MEDS BY ELIG CLIN: HCPCS | Performed by: FAMILY MEDICINE

## 2019-06-10 RX ORDER — LISINOPRIL AND HYDROCHLOROTHIAZIDE 25; 20 MG/1; MG/1
1 TABLET ORAL DAILY
Qty: 30 TABLET | Refills: 0 | Status: SHIPPED | OUTPATIENT
Start: 2019-06-10 | End: 2019-08-10 | Stop reason: SDUPTHER

## 2019-06-10 ASSESSMENT — ENCOUNTER SYMPTOMS
COUGH: 0
NAUSEA: 0
VOMITING: 0
DIARRHEA: 0
ABDOMINAL PAIN: 0
CONSTIPATION: 0
SHORTNESS OF BREATH: 0

## 2019-06-10 ASSESSMENT — PATIENT HEALTH QUESTIONNAIRE - PHQ9
1. LITTLE INTEREST OR PLEASURE IN DOING THINGS: 0
2. FEELING DOWN, DEPRESSED OR HOPELESS: 0
SUM OF ALL RESPONSES TO PHQ QUESTIONS 1-9: 0
SUM OF ALL RESPONSES TO PHQ9 QUESTIONS 1 & 2: 0
SUM OF ALL RESPONSES TO PHQ QUESTIONS 1-9: 0

## 2019-06-10 NOTE — LETTER
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 If you have any questions or concerns, please don't hesitate to call.     Sincerely,        León Krueger MD

## 2019-06-10 NOTE — PROGRESS NOTES
Visit Information    Have you changed or started any medications since your last visit including any over-the-counter medicines, vitamins, or herbal medicines? no   Have you stopped taking any of your medications? Is so, why? -  no  Are you having any side effects from any of your medications? - no    Have you seen any other physician or provider since your last visit?  no   Have you had any other diagnostic tests since your last visit?  no   Have you been seen in the emergency room and/or had an admission in a hospital since we last saw you?  yes - [facility]    Have you had your routine dental cleaning in the past 6 months?  no     Do you have an active MyChart account? If no, what is the barrier?   Yes    Patient Care Team:  Anastacio Koyanagi as PCP - [address] [place] [address],  as Consulting Physician (General Surgery)    Medical History Review  Past Medical, Family, and Social History reviewed and does not contribute to the patient presenting condition    Health Maintenance   Topic Date Due    Varicella Vaccine (1 of 2 - 13+ 2-dose series) 03/08/1993    Hepatitis B Vaccine (1 of 3 - Risk 3-dose series) 03/08/1999    Diabetic retinal exam  05/26/2017    Diabetic foot exam  01/15/2019    A1C test (Diabetic or Prediabetic)  04/22/2019    Diabetic microalbuminuria test  07/20/2019    Lipid screen  07/20/2019    Potassium monitoring  04/07/2020    Creatinine monitoring  04/07/2020    DTaP/Tdap/Td vaccine (2 - Td) 08/15/2027    Flu vaccine  Completed    Pneumococcal 0-64 years Vaccine  Completed    HIV screen  Completed

## 2019-06-10 NOTE — PROGRESS NOTES
Subjective:      Rajat Platt is a 44 y.o. male with Hx of  has a past medical history of Diabetes mellitus ([facility] Erectile dysfunction, Hypertension, Morbid obesity ([facility] and Type 2 diabetes mellitus without complication ([facility] Presented to the office today for:     HPI    Follow-up of Type 2 diabetes mellitus. Meds - lantus 14 U QHS, metformin 1000mgBID, Januvia 100mgQD  Compliance questionable   Home blood sugar records: fasting range: average 300  Any episodes of hypoglycemia? no  Fluctuating blood sugars? yes  Weight trend: stable  Current diet: in general, a \"healthy\" diet   but eats a lot of bread and juice   Current exercise: walking  Known diabetic complications: none    Hgb A1c -   Lab Results   Component Value Date    LABA1C 10.8 06/10/2019     BP -   BP Readings from Last 1 Encounters:   06/10/19 (!) 143/104     Lipid Panel -   Lab Results   Component Value Date    HDL 54 07/20/2018    TRIG 122 06/01/2017     Albumin to creatinine Ratio - NL 07/2018  Is He on ACE inhibitor or angiotensin II receptor blocker? Yes  Is He on Aspirin? no  Eye exam current (within one year): no  Foot exam current (within one year): no  Is He Smoker? No  Did He receive influenza vaccine within the last 12 months? Yes  Did He receive Pneumococcal vaccine? Yes      Presents for evaluation of hypertension. He indicates that he is feeling well and denies any symptoms referable to his elevated blood pressure. Specifically denies headache, vision changes, chest pain, palpitations, dyspnea, orthopnea, or peripheral edema. Current medication regimen include lisinopril/HCTZ 20/12.5mgQD. Patient denies any side effects of medication. No compliance issues. Review of Systems   Constitutional: Negative for chills and fever. Eyes: Negative for visual disturbance. Respiratory: Negative for cough and shortness of breath. Cardiovascular: Negative for chest pain, palpitations and leg swelling.    Gastrointestinal: Negative for abdominal pain, constipation, diarrhea, nausea and vomiting. Neurological: Negative for headaches.        Family History   Problem Relation Age of Onset    Diabetes Mother     High Cholesterol Mother     High Blood Pressure Mother     Heart Disease Mother        Social History     Socioeconomic History    Marital status: Single     Spouse name: Not on file    Number of children: Not on file    Years of education: Not on file    Highest education level: Not on file   Occupational History    Not on file   Social Needs    Financial resource strain: Not on file    Food insecurity:     Worry: Not on file     Inability: Not on file    Transportation needs:     Medical: Not on file     Non-medical: Not on file   Tobacco Use    Smoking status: Never Smoker    Smokeless tobacco: Never Used   Substance and Sexual Activity    Alcohol use: No    Drug use: No    Sexual activity: Not on file   Lifestyle    Physical activity:     Days per week: Not on file     Minutes per session: Not on file    Stress: Not on file   Relationships    Social connections:     Talks on phone: Not on file     Gets together: Not on file     Attends Yarsanism service: Not on file     Active member of club or organization: Not on file     Attends meetings of clubs or organizations: Not on file     Relationship status: Not on file    Intimate partner violence:     Fear of current or ex partner: Not on file     Emotionally abused: Not on file     Physically abused: Not on file     Forced sexual activity: Not on file   Other Topics Concern    Not on file   Social History Narrative    Not on file       Objective:      BP (!) 143/104 (Site: Right Upper Arm, Position: Sitting, Cuff Size: Large Adult)   Pulse 100   Temp 97.2 °F (36.2 °C) (Temporal)   Ht 5' 8\" (1.727 m)   Wt (!) 304 lb 3.2 oz (138 kg)   BMI 46.25 kg/m²    BP Readings from Last 3 Encounters:   06/10/19 (!) 143/104   06/09/19 (!) 152/102   05/15/19 (!) 165/101       Physical Exam     General Appearance:  A&Ox3, NAD   Lungs:  Clear to auscultation B/L.  Cardiovascular:  NL S1/S2, RRR, no m,g,r.  [facility] Skin: Intact, no bruising or bleeding on exposed skin area   MSK: No extremity cyanosis, clubbing or edema    Assessment:     1. Type 2 diabetes mellitus without complication, with long-term current use of insulin ([facility]    2. Essential hypertension    3. Morbid obesity with BMI of 45.0-49.9, adult ([facility]    4. Health care maintenance    5. Type 2 diabetes mellitus without complication, without long-term current use of insulin ([facility]        Plan:      1. Type 2 diabetes mellitus without complication, with long-term current use of insulin ([facility])  - uncontrolled. Increase lantus by 3 units. Pt was instructed to increase lantus to 20 in on week if fasting still >150.   - has upcoming cindy for eye exam  - foot exam on next visit     2. Essential hypertension  - increase HCTZ to 25mg  - decrease salt and NSAIDs    3. Morbid obesity with BMI of 45.0-49.9, adult ([facility]  - pt missed one cindy in the past with weight management but interested in weight loss and motivated.  Will send back   - Anna Miranda MD, Weight Management and [address] [place] [address] Prescriptions     Signed Prescriptions Disp Refills    insulin glargine (LANTUS SOLOSTAR) 100 UNIT/ML injection pen 5 pen 3     Sig: Inject 17 Units into the skin nightly    lisinopril-hydrochlorothiazide (PRINZIDE;ZESTORETIC) 20-25 MG per tablet 30 tablet 0     Sig: Take 1 tablet by mouth daily       Medications Discontinued During This Encounter   Medication Reason    lisinopril-hydrochlorothiazide (PRINZIDE;ZESTORETIC) 20-25 MG per tablet DUPLICATE    ibuprofen (ADVIL;MOTRIN) 519 MG tablet DUPLICATE    lisinopril-hydrochlorothiazide (PRINZIDE;ZESTORETIC) 20-12.5 MG per tablet DOSE ADJUSTMENT    lisinopril-hydrochlorothiazide (PRINZIDE;ZESTORETIC) 20-12.5 MG per tablet DOSE ADJUSTMENT    insulin glargine (LANTUS SOLOSTAR) 100 UNIT/ML injection pen          Ryder received counseling on the following healthy behaviors: nutrition, exercise and medication adherence    Patient given educational materials : see patient instruction     Discussed use, benefit, and side effects of prescribed medications. Barriers to medication compliance addressed. All patient questions answered. Pt voiced understanding. Return in about 1 month (around 7/8/2019) for DM. Please note that this chart was generated using voice recognition Dragon dictation software.   Although every effort was made to ensure the accuracy of this automated transcription, some errors in transcription may have occurred

## 2019-06-17 NOTE — PROGRESS NOTES
I have reviewed and discussed key elements of [facility] the resident including plan of care and follow up and agree with the care mitch plan.

## 2019-07-12 ENCOUNTER — OFFICE VISIT (OUTPATIENT)
Dept: FAMILY MEDICINE CLINIC | Age: 39
End: 2019-07-12
Payer: COMMERCIAL

## 2019-07-12 VITALS
HEIGHT: 68 IN | TEMPERATURE: 97.8 F | BODY MASS INDEX: 45.83 KG/M2 | DIASTOLIC BLOOD PRESSURE: 89 MMHG | WEIGHT: 302.4 LBS | SYSTOLIC BLOOD PRESSURE: 135 MMHG | HEART RATE: 84 BPM

## 2019-07-12 DIAGNOSIS — E11.9 TYPE 2 DIABETES MELLITUS WITHOUT COMPLICATION, WITH LONG-TERM CURRENT USE OF INSULIN (HCC): Primary | ICD-10-CM

## 2019-07-12 DIAGNOSIS — Z79.4 TYPE 2 DIABETES MELLITUS WITHOUT COMPLICATION, WITH LONG-TERM CURRENT USE OF INSULIN (HCC): Primary | ICD-10-CM

## 2019-07-12 PROCEDURE — 2022F DILAT RTA XM EVC RTNOPTHY: CPT | Performed by: STUDENT IN AN ORGANIZED HEALTH CARE EDUCATION/TRAINING PROGRAM

## 2019-07-12 PROCEDURE — 99211 OFF/OP EST MAY X REQ PHY/QHP: CPT | Performed by: STUDENT IN AN ORGANIZED HEALTH CARE EDUCATION/TRAINING PROGRAM

## 2019-07-12 PROCEDURE — G8427 DOCREV CUR MEDS BY ELIG CLIN: HCPCS | Performed by: STUDENT IN AN ORGANIZED HEALTH CARE EDUCATION/TRAINING PROGRAM

## 2019-07-12 PROCEDURE — 99213 OFFICE O/P EST LOW 20 MIN: CPT | Performed by: STUDENT IN AN ORGANIZED HEALTH CARE EDUCATION/TRAINING PROGRAM

## 2019-07-12 PROCEDURE — 3046F HEMOGLOBIN A1C LEVEL >9.0%: CPT | Performed by: STUDENT IN AN ORGANIZED HEALTH CARE EDUCATION/TRAINING PROGRAM

## 2019-07-12 PROCEDURE — G8417 CALC BMI ABV UP PARAM F/U: HCPCS | Performed by: STUDENT IN AN ORGANIZED HEALTH CARE EDUCATION/TRAINING PROGRAM

## 2019-07-12 PROCEDURE — 1036F TOBACCO NON-USER: CPT | Performed by: STUDENT IN AN ORGANIZED HEALTH CARE EDUCATION/TRAINING PROGRAM

## 2019-07-12 RX ORDER — AMOXICILLIN 500 MG/1
500 CAPSULE ORAL 2 TIMES DAILY
Qty: 14 CAPSULE | Refills: 0 | Status: SHIPPED | OUTPATIENT
Start: 2019-07-12 | End: 2019-07-19

## 2019-07-12 ASSESSMENT — ENCOUNTER SYMPTOMS
EYE PAIN: 0
TROUBLE SWALLOWING: 0
BACK PAIN: 0
COUGH: 0
SORE THROAT: 0
CHEST TIGHTNESS: 0
SHORTNESS OF BREATH: 0
NAUSEA: 0
VOMITING: 0
DIARRHEA: 0
RHINORRHEA: 0
ABDOMINAL PAIN: 0
SINUS PAIN: 0
ABDOMINAL DISTENTION: 0
WHEEZING: 0
EYE REDNESS: 0
CONSTIPATION: 0

## 2019-07-27 ENCOUNTER — HOSPITAL ENCOUNTER (EMERGENCY)
Age: 39
Discharge: HOME | End: 2019-07-27
Attending: EMERGENCY MEDICINE
Payer: COMMERCIAL

## 2019-07-27 VITALS
SYSTOLIC BLOOD PRESSURE: 140 MMHG | DIASTOLIC BLOOD PRESSURE: 101 MMHG | BODY MASS INDEX: 47.76 KG/M2 | OXYGEN SATURATION: 97 % | RESPIRATION RATE: 16 BRPM | HEART RATE: 86 BPM | TEMPERATURE: 98.2 F | WEIGHT: 304.3 LBS | HEIGHT: 67 IN

## 2019-07-27 DIAGNOSIS — K08.89 DENTALGIA: Primary | ICD-10-CM

## 2019-07-27 PROCEDURE — 99283 EMERGENCY DEPT VISIT LOW MDM: CPT

## 2019-07-27 RX ORDER — TRAMADOL HYDROCHLORIDE 50 MG/1
50 TABLET ORAL EVERY 8 HOURS PRN
Qty: 15 TABLET | Refills: 0 | Status: SHIPPED | OUTPATIENT
Start: 2019-07-27 | End: 2019-08-01

## 2019-07-27 RX ORDER — PENICILLIN V POTASSIUM 500 MG/1
500 TABLET ORAL 4 TIMES DAILY
Qty: 40 TABLET | Refills: 0 | Status: SHIPPED | OUTPATIENT
Start: 2019-07-27 | End: 2019-11-04

## 2019-07-27 ASSESSMENT — PAIN DESCRIPTION - LOCATION: LOCATION: TEETH

## 2019-07-27 ASSESSMENT — PAIN DESCRIPTION - ORIENTATION: ORIENTATION: LEFT;UPPER

## 2019-07-27 ASSESSMENT — ENCOUNTER SYMPTOMS
COLOR CHANGE: 0
WHEEZING: 0
VOMITING: 0
NAUSEA: 0
SHORTNESS OF BREATH: 0
CONSTIPATION: 0
SORE THROAT: 0
ABDOMINAL PAIN: 0
DIARRHEA: 0
COUGH: 0
SINUS PRESSURE: 0
RHINORRHEA: 0

## 2019-07-27 ASSESSMENT — PAIN SCALES - GENERAL: PAINLEVEL_OUTOF10: 10

## 2019-07-27 ASSESSMENT — PAIN DESCRIPTION - FREQUENCY: FREQUENCY: CONTINUOUS

## 2019-07-27 ASSESSMENT — PAIN DESCRIPTION - DESCRIPTORS: DESCRIPTORS: STABBING

## 2019-07-27 NOTE — ED PROVIDER NOTES
[facility] ED  eMERGENCY dEPARTMENT eNCOUnter      Pt Name: Familia Smith  MRN: [id number]  [place] 1980  Date of evaluation: 7/27/2019  Provider: Rosalinda Cardona NP, [facility] [address]       Chief Complaint   Patient presents with    Dental Pain     left upper, dental appt 8/19    Headache         HISTORY OF PRESENT ILLNESS  (Location/Symptom, Timing/Onset, Context/Setting, Quality, Duration, Modifying Factors, Severity.)   Familia Smith is a 44 y.o. male who presents to the emergency department by private vehicle for evaluation of left upper dental pain. Patient states that for the last 3 days he has been having pain to the left upper gumline. He has an appointment in August.  He states he has been having problems with these teeth before. He states it has been intermittent in nature but it has been pretty constant for the last 3 days. Nursing Notes were reviewed. ALLERGIES     Patient has no known allergies.     CURRENT MEDICATIONS       Discharge Medication List as of 7/27/2019 12:30 PM      CONTINUE these medications which have NOT CHANGED    Details   insulin glargine (LANTUS SOLOSTAR) 100 UNIT/ML injection pen Inject 20 Units into the skin nightly, Disp-5 pen, R-3Normal      lisinopril-hydrochlorothiazide (PRINZIDE;ZESTORETIC) 20-25 MG per tablet Take 1 tablet by mouth daily, Disp-30 tablet, R-0Normal      metFORMIN (GLUCOPHAGE) 1000 MG tablet Take 1 tablet by mouth 2 times daily (with meals), Disp-60 tablet, R-0Print      JANUVIA 100 MG tablet take 1 tablet by mouth once daily, Disp-90 tablet, R-0Normal      ibuprofen (ADVIL;MOTRIN) 800 MG tablet Take 1 tablet by mouth every 8 hours as needed for Pain, Disp-30 tablet, R-0Print      acetaminophen (TYLENOL) 325 MG tablet Take 2 tablets by mouth every 6 hours as needed for Pain, Disp-30 tablet, R-0Print      dicyclomine (BENTYL) 10 MG capsule Take 1 capsule by mouth every 6 hours as needed (cramps), Disp-20 capsule, Cardiovascular: Negative for chest pain and palpitations. Gastrointestinal: Negative for abdominal pain, constipation, diarrhea, nausea and vomiting. Genitourinary: Negative for dysuria and hematuria. Musculoskeletal: Negative for arthralgias and myalgias. Skin: Negative for color change and rash. Neurological: Negative for dizziness, weakness and headaches. Hematological: Negative for adenopathy. Except as noted above the remainder of the review of systems was reviewed and negative. PHYSICAL EXAM    (up to 7 for level 4, 8 or more for level 5)     ED Triage Vitals [07/27/19 1223]   BP Temp Temp Source Pulse Resp SpO2 Height Weight   (!) 140/101 98.2 °F (36.8 °C) Oral 86 16 97 % 5' 7\" (1.702 m) (!) 304 lb 4.8 oz (138 kg)       Physical Exam   Constitutional: He is oriented to person, place, and time. He appears well-developed and well-nourished. HENT:   Head: Normocephalic and atraumatic. Mouth/Throat: Oropharynx is clear and moist.       No Swelling to the floor of the mouth   Eyes: Pupils are equal, round, and reactive to light. Conjunctivae are normal.   Neck: Normal range of motion. Neck supple. Cardiovascular: Normal rate and regular rhythm. Pulmonary/Chest: Effort normal and breath sounds normal. No stridor. No respiratory distress. Abdominal: Soft. Bowel sounds are normal.   Musculoskeletal: Normal range of motion. Lymphadenopathy:     He has no cervical adenopathy. Neurological: He is alert and oriented to person, place, and time. Skin: Skin is warm and dry. No rash noted. Psychiatric: He has a normal mood and affect. Vitals reviewed. LABS:  Labs Reviewed - No data to display    All other labs were within normal range or not returned as of this dictation.     EMERGENCY DEPARTMENT COURSE and DIFFERENTIAL DIAGNOSIS/MDM:   Vitals:    Vitals:    07/27/19 1223   BP: (!) 140/101   Pulse: 86   Resp: 16   Temp: 98.2 °F (36.8 °C)   TempSrc: Oral   SpO2: 97%

## 2019-08-13 RX ORDER — LISINOPRIL AND HYDROCHLOROTHIAZIDE 25; 20 MG/1; MG/1
TABLET ORAL
Qty: 30 TABLET | Refills: 0 | Status: SHIPPED | OUTPATIENT
Start: 2019-08-13 | End: 2019-10-27 | Stop reason: SDUPTHER

## 2019-08-19 DIAGNOSIS — E11.9 TYPE 2 DIABETES MELLITUS WITHOUT COMPLICATION, WITHOUT LONG-TERM CURRENT USE OF INSULIN (HCC): ICD-10-CM

## 2019-08-25 RX ORDER — PEN NEEDLE, DIABETIC 31 GX5/16"
NEEDLE, DISPOSABLE MISCELLANEOUS
Qty: 100 EACH | Refills: 3 | Status: SHIPPED | OUTPATIENT
Start: 2019-08-25 | End: 2020-10-02 | Stop reason: SDUPTHER

## 2019-09-19 NOTE — TELEPHONE ENCOUNTER
Patient is taking medication differently. He is injecting 25 units QD. Please adjust refill. Please address the medication refill and close the encounter. If I can be of assistance, please route to the applicable pool. Thank you. Last visit: 07/12/19  Last Med refill: 07/12/19  Does patient have enough medication for 72 hours: No: Please see above note    Next Visit Date:  No future appointments. Health Maintenance   Topic Date Due    Varicella Vaccine (1 of 2 - 13+ 2-dose series) 03/08/1993    Hepatitis B Vaccine (1 of 3 - Risk 3-dose series) 03/08/1999    Diabetic retinal exam  05/26/2017    Annual Wellness Visit (AWV)  06/19/2019    Diabetic microalbuminuria test  07/20/2019    Lipid screen  07/20/2019    Flu vaccine (1) 09/01/2019    A1C test (Diabetic or Prediabetic)  09/10/2019    Potassium monitoring  04/07/2020    Creatinine monitoring  04/07/2020    Diabetic foot exam  07/12/2020    DTaP/Tdap/Td vaccine (2 - Td) 08/15/2027    Pneumococcal 0-64 years Vaccine  Completed    HIV screen  Completed       Hemoglobin A1C (%)   Date Value   06/10/2019 10.8   01/22/2019 10.6   10/19/2018 10.9             ( goal A1C is < 7)   Microalb/Crt.  Ratio (mcg/mg creat)   Date Value   07/20/2018 CANNOT BE CALCULATED     LDL Cholesterol (mg/dL)   Date Value   07/20/2018 116   06/01/2017 70       (goal LDL is <100)   AST (U/L)   Date Value   04/07/2019 71 (H)     ALT (U/L)   Date Value   04/07/2019 98 (H)     BUN (mg/dL)   Date Value   04/07/2019 7     BP Readings from Last 3 Encounters:   07/27/19 (!) 140/101   07/12/19 135/89   06/10/19 (!) 143/104          (goal 120/80)    All Future Testing planned in [facility]              Patient Active Problem List:     Type 2 diabetes mellitus without complication, with long-term current use of insulin (HCC)     Essential hypertension     Morbid obesity (HCC)     Scalp lesion     Erectile dysfunction     Morbid obesity with BMI of 45.0-49.9, adult ([facility]

## 2019-10-22 ENCOUNTER — TELEPHONE (OUTPATIENT)
Dept: FAMILY MEDICINE CLINIC | Age: 39
End: 2019-10-22

## 2019-10-28 RX ORDER — LISINOPRIL AND HYDROCHLOROTHIAZIDE 25; 20 MG/1; MG/1
TABLET ORAL
Qty: 30 TABLET | Refills: 0 | Status: SHIPPED | OUTPATIENT
Start: 2019-10-28 | End: 2019-12-23

## 2019-11-04 ENCOUNTER — HOSPITAL ENCOUNTER (EMERGENCY)
Age: 39
Discharge: HOME | End: 2019-11-04
Attending: EMERGENCY MEDICINE
Payer: COMMERCIAL

## 2019-11-04 VITALS
WEIGHT: 297 LBS | RESPIRATION RATE: 17 BRPM | BODY MASS INDEX: 46.62 KG/M2 | OXYGEN SATURATION: 95 % | HEIGHT: 67 IN | DIASTOLIC BLOOD PRESSURE: 96 MMHG | HEART RATE: 90 BPM | TEMPERATURE: 98 F | SYSTOLIC BLOOD PRESSURE: 138 MMHG

## 2019-11-04 DIAGNOSIS — K08.89 PAIN, DENTAL: Primary | ICD-10-CM

## 2019-11-04 PROCEDURE — 99282 EMERGENCY DEPT VISIT SF MDM: CPT

## 2019-11-04 RX ORDER — ACETAMINOPHEN AND CODEINE PHOSPHATE 300; 30 MG/1; MG/1
1 TABLET ORAL EVERY 6 HOURS PRN
Qty: 12 TABLET | Refills: 0 | Status: SHIPPED | OUTPATIENT
Start: 2019-11-04 | End: 2019-11-07

## 2019-11-04 RX ORDER — COVID-19 ANTIGEN TEST
KIT MISCELLANEOUS
COMMUNITY
End: 2020-01-04

## 2019-11-04 RX ORDER — PENICILLIN V POTASSIUM 500 MG/1
500 TABLET ORAL 4 TIMES DAILY
Qty: 40 TABLET | Refills: 0 | Status: SHIPPED | OUTPATIENT
Start: 2019-11-04 | End: 2020-01-04

## 2019-11-04 ASSESSMENT — ENCOUNTER SYMPTOMS
COLOR CHANGE: 0
CONSTIPATION: 0
NAUSEA: 0
DIARRHEA: 0
SINUS PRESSURE: 0
RHINORRHEA: 0
SORE THROAT: 0
SHORTNESS OF BREATH: 0
ABDOMINAL PAIN: 0
COUGH: 0
WHEEZING: 0
VOMITING: 0

## 2019-11-04 ASSESSMENT — PAIN DESCRIPTION - DESCRIPTORS: DESCRIPTORS: ACHING

## 2019-11-04 ASSESSMENT — PAIN DESCRIPTION - LOCATION: LOCATION: TEETH

## 2019-11-04 ASSESSMENT — PAIN SCALES - GENERAL: PAINLEVEL_OUTOF10: 10

## 2019-11-04 ASSESSMENT — PAIN DESCRIPTION - ORIENTATION: ORIENTATION: LEFT;LOWER

## 2019-11-04 ASSESSMENT — PAIN DESCRIPTION - PAIN TYPE: TYPE: ACUTE PAIN

## 2019-11-21 DIAGNOSIS — E11.9 TYPE 2 DIABETES MELLITUS WITHOUT COMPLICATION, WITH LONG-TERM CURRENT USE OF INSULIN (HCC): ICD-10-CM

## 2019-11-21 DIAGNOSIS — Z79.4 TYPE 2 DIABETES MELLITUS WITHOUT COMPLICATION, WITH LONG-TERM CURRENT USE OF INSULIN (HCC): ICD-10-CM

## 2019-11-21 RX ORDER — SITAGLIPTIN 100 MG/1
TABLET, FILM COATED ORAL
Qty: 90 TABLET | Refills: 0 | Status: SHIPPED | OUTPATIENT
Start: 2019-11-21 | End: 2020-03-16 | Stop reason: SDUPTHER

## 2019-12-05 ENCOUNTER — OFFICE VISIT (OUTPATIENT)
Dept: FAMILY MEDICINE CLINIC | Age: 39
End: 2019-12-05
Payer: COMMERCIAL

## 2019-12-05 ENCOUNTER — HOSPITAL ENCOUNTER (OUTPATIENT)
Age: 39
Setting detail: SPECIMEN
Discharge: HOME | End: 2019-12-05
Payer: COMMERCIAL

## 2019-12-05 VITALS
BODY MASS INDEX: 45.74 KG/M2 | HEIGHT: 67 IN | WEIGHT: 291.4 LBS | HEART RATE: 93 BPM | SYSTOLIC BLOOD PRESSURE: 127 MMHG | DIASTOLIC BLOOD PRESSURE: 89 MMHG

## 2019-12-05 DIAGNOSIS — Z13.220 SCREENING FOR HYPERLIPIDEMIA: ICD-10-CM

## 2019-12-05 DIAGNOSIS — E11.9 TYPE 2 DIABETES MELLITUS WITHOUT COMPLICATION, WITH LONG-TERM CURRENT USE OF INSULIN (HCC): ICD-10-CM

## 2019-12-05 DIAGNOSIS — E11.9 TYPE 2 DIABETES MELLITUS WITHOUT COMPLICATION, WITH LONG-TERM CURRENT USE OF INSULIN (HCC): Primary | ICD-10-CM

## 2019-12-05 DIAGNOSIS — Z79.4 TYPE 2 DIABETES MELLITUS WITHOUT COMPLICATION, WITH LONG-TERM CURRENT USE OF INSULIN (HCC): ICD-10-CM

## 2019-12-05 DIAGNOSIS — Z79.4 TYPE 2 DIABETES MELLITUS WITHOUT COMPLICATION, WITH LONG-TERM CURRENT USE OF INSULIN (HCC): Primary | ICD-10-CM

## 2019-12-05 LAB
CHOLESTEROL/HDL RATIO: 3.7
CHOLESTEROL: 223 MG/DL
CREATININE URINE: 107.8 MG/DL (ref 39–259)
HBA1C MFR BLD: 14 %
HDLC SERPL-MCNC: 61 MG/DL
LDL CHOLESTEROL: 130 MG/DL (ref 0–130)
MICROALBUMIN/CREAT 24H UR: 66 MG/L
MICROALBUMIN/CREAT UR-RTO: 61 MCG/MG CREAT
TRIGL SERPL-MCNC: 158 MG/DL
VLDLC SERPL CALC-MCNC: ABNORMAL MG/DL (ref 1–30)

## 2019-12-05 PROCEDURE — 99213 OFFICE O/P EST LOW 20 MIN: CPT | Performed by: STUDENT IN AN ORGANIZED HEALTH CARE EDUCATION/TRAINING PROGRAM

## 2019-12-05 PROCEDURE — 99211 OFF/OP EST MAY X REQ PHY/QHP: CPT | Performed by: FAMILY MEDICINE

## 2019-12-05 PROCEDURE — 83036 HEMOGLOBIN GLYCOSYLATED A1C: CPT | Performed by: STUDENT IN AN ORGANIZED HEALTH CARE EDUCATION/TRAINING PROGRAM

## 2019-12-05 ASSESSMENT — ENCOUNTER SYMPTOMS
PHOTOPHOBIA: 0
CHEST TIGHTNESS: 0

## 2019-12-23 RX ORDER — LISINOPRIL AND HYDROCHLOROTHIAZIDE 25; 20 MG/1; MG/1
TABLET ORAL
Qty: 30 TABLET | Refills: 0 | Status: SHIPPED | OUTPATIENT
Start: 2019-12-23 | End: 2020-02-19 | Stop reason: SDUPTHER

## 2020-01-03 VITALS
OXYGEN SATURATION: 96 % | SYSTOLIC BLOOD PRESSURE: 152 MMHG | WEIGHT: 303.2 LBS | HEART RATE: 97 BPM | BODY MASS INDEX: 47.59 KG/M2 | HEIGHT: 67 IN | RESPIRATION RATE: 16 BRPM | DIASTOLIC BLOOD PRESSURE: 106 MMHG | TEMPERATURE: 98.2 F

## 2020-01-03 ASSESSMENT — PAIN DESCRIPTION - LOCATION: LOCATION: TEETH

## 2020-01-03 ASSESSMENT — PAIN SCALES - GENERAL: PAINLEVEL_OUTOF10: 10

## 2020-01-03 ASSESSMENT — PAIN DESCRIPTION - FREQUENCY: FREQUENCY: CONTINUOUS

## 2020-01-03 ASSESSMENT — PAIN DESCRIPTION - PAIN TYPE: TYPE: ACUTE PAIN

## 2020-01-03 ASSESSMENT — PAIN DESCRIPTION - DESCRIPTORS: DESCRIPTORS: ACHING

## 2020-01-04 ENCOUNTER — HOSPITAL ENCOUNTER (EMERGENCY)
Age: 40
Discharge: HOME | End: 2020-01-04
Attending: EMERGENCY MEDICINE
Payer: COMMERCIAL

## 2020-01-04 PROCEDURE — 99282 EMERGENCY DEPT VISIT SF MDM: CPT

## 2020-01-04 RX ORDER — ACETAMINOPHEN AND CODEINE PHOSPHATE 300; 30 MG/1; MG/1
1-2 TABLET ORAL EVERY 6 HOURS PRN
Qty: 12 TABLET | Refills: 0 | Status: SHIPPED | OUTPATIENT
Start: 2020-01-04 | End: 2020-01-07

## 2020-01-04 RX ORDER — NAPROXEN 500 MG/1
500 TABLET ORAL
Qty: 21 TABLET | Refills: 0 | Status: SHIPPED | OUTPATIENT
Start: 2020-01-04 | End: 2020-01-25

## 2020-01-04 RX ORDER — PENICILLIN V POTASSIUM 500 MG/1
500 TABLET ORAL 4 TIMES DAILY
Qty: 40 TABLET | Refills: 0 | Status: SHIPPED | OUTPATIENT
Start: 2020-01-04 | End: 2020-01-25

## 2020-01-04 NOTE — ED PROVIDER NOTES
SURGICAL HISTORY           Procedure Laterality Date    PRE-MALIGNANT / BENIGN SKIN LESION EXCISION Right 2017    rt scalp lesion removed    TONSILLECTOMY           FAMILY HISTORY           Problem Relation Age of Onset    Diabetes Mother     High Cholesterol Mother     High Blood Pressure Mother     Heart Disease Mother      Family Status   Relation Name Status    Mother  Alive    Father          SOCIAL HISTORY      reports that he has never smoked. He has never used smokeless tobacco. He reports that he does not drink alcohol or use drugs. REVIEW OFSYSTEMS    (2-9 systems for level 4, 10 or more for level 5)   Review of Systems    Except as noted above the remainder of the review of systems was reviewed and negative. PHYSICAL EXAM    (up to 7 for level 4, 8 or more for level 5)     ED Triage Vitals [20 2257]   BP Temp Temp Source Pulse Resp SpO2 Height Weight   (!) 152/106 98.2 °F (36.8 °C) Oral 97 16 96 % 5' 7\" (1.702 m) (!) 303 lb 3.2 oz (137.5 kg)      Physical Exam  Constitutional:       Appearance: He is well-developed. HENT:      Head: Normocephalic and atraumatic. Mouth/Throat:      Dentition: Abnormal dentition. Dental caries present. No gingival swelling, dental abscesses or gum lesions. Musculoskeletal: Normal range of motion. Skin:     General: Skin is warm. Neurological:      Mental Status: He is alert and oriented to person, place, and time.    Psychiatric:         Behavior: Behavior normal.                 DIAGNOSTIC RESULTS     EKG: All EKG's are interpreted by the Emergency Department Physician who either signs or Co-signs this chart in the absence of a cardiologist.        RADIOLOGY:   Non-plain film images such as CT, Ultrasound and MRI are read by the radiologist. Plain radiographic images arevisualized and preliminarily interpreted by the emergency physician with the below findings:        Interpretation per the Radiologist below, if available at thetime of this note:          ED BEDSIDE ULTRASOUND:   Performed by ED Physician - none    LABS:  Labs Reviewed - No data to display    All other labs were within normal range or not returned as of this dictation. EMERGENCY DEPARTMENT COURSE and DIFFERENTIAL DIAGNOSIS/MDM:   Vitals:    Vitals:    01/03/20 2257   BP: (!) 152/106   Pulse: 97   Resp: 16   Temp: 98.2 °F (36.8 °C)   TempSrc: Oral   SpO2: 96%   Weight: (!) 303 lb 3.2 oz (137.5 kg)   Height: 5' 7\" (1.702 m)     No dental abscess. Will DC home    Pre-hypertension/Hypertension: The patient has been informed that they may have pre-hypertension or Hypertension based on a blood pressure reading in the emergency department. I recommend that the patient call the primary care provider listed on their discharge instructions or a physician of their choice this week to arrange follow up for further evaluation of possible pre-hypertension or Hypertension. CONSULTS:  None    PROCEDURES:  Procedures        FINAL IMPRESSION      1. Dentalgia    2. Elevated blood pressure reading          DISPOSITION/PLAN   DISPOSITION Decision To Discharge 01/04/2020 01:09:50 AM      PATIENTREFERRED TO:   Jim Hull MD  [facility] [address]  [phone]    In 3 days        DISCHARGE MEDICATIONS:     New Prescriptions    ACETAMINOPHEN-CODEINE (TYLENOL/CODEINE #3) 300-30 MG PER TABLET    Take 1-2 tablets by mouth every 6 hours as needed for Pain for up to 3 days.     NAPROXEN (NAPROSYN) 500 MG TABLET    Take 1 tablet by mouth 3 times daily (with meals)    PENICILLIN V POTASSIUM (VEETID) 500 MG TABLET    Take 1 tablet by mouth 4 times daily           (Please note that portions of this note were completed with a voice recognition program.  Efforts were made to edit thedictations but occasionally words are mis-transcribed.)    MAYKEL Metcalf PA-C  01/04/20 0111       Cooper Bush Chante Berry PA-C  01/04/20 [facility]  01/04/20 0207

## 2020-01-04 NOTE — ED NOTES
Pt presents to er with c/o dental pain. Pt states he has an appointment with oral surgeon in February. Pt a&ox3. Skin warm and dry. Respirations even and non-labored.       Yuridia Strauss RN  01/04/20 2004

## 2020-01-25 ENCOUNTER — HOSPITAL ENCOUNTER (EMERGENCY)
Age: 40
Discharge: HOME | End: 2020-01-25
Attending: EMERGENCY MEDICINE
Payer: COMMERCIAL

## 2020-01-25 VITALS
HEART RATE: 107 BPM | DIASTOLIC BLOOD PRESSURE: 97 MMHG | RESPIRATION RATE: 18 BRPM | SYSTOLIC BLOOD PRESSURE: 147 MMHG | TEMPERATURE: 98.1 F | OXYGEN SATURATION: 93 %

## 2020-01-25 PROCEDURE — 96372 THER/PROPH/DIAG INJ SC/IM: CPT

## 2020-01-25 PROCEDURE — 6360000002 HC RX W HCPCS: Performed by: PHYSICIAN ASSISTANT

## 2020-01-25 PROCEDURE — 99282 EMERGENCY DEPT VISIT SF MDM: CPT

## 2020-01-25 RX ORDER — KETOROLAC TROMETHAMINE 30 MG/ML
30 INJECTION, SOLUTION INTRAMUSCULAR; INTRAVENOUS ONCE
Status: COMPLETED | OUTPATIENT
Start: 2020-01-25 | End: 2020-01-25

## 2020-01-25 RX ORDER — NAPROXEN 500 MG/1
500 TABLET ORAL 2 TIMES DAILY
Qty: 20 TABLET | Refills: 0 | Status: SHIPPED | OUTPATIENT
Start: 2020-01-25 | End: 2021-08-17

## 2020-01-25 RX ORDER — PENICILLIN V POTASSIUM 500 MG/1
500 TABLET ORAL 4 TIMES DAILY
Qty: 28 TABLET | Refills: 0 | Status: SHIPPED | OUTPATIENT
Start: 2020-01-25 | End: 2020-02-01

## 2020-01-25 RX ADMIN — KETOROLAC TROMETHAMINE 30 MG: 30 INJECTION, SOLUTION INTRAMUSCULAR; INTRAVENOUS at 16:24

## 2020-01-25 ASSESSMENT — ENCOUNTER SYMPTOMS
ABDOMINAL PAIN: 0
VOMITING: 0
COUGH: 0
SHORTNESS OF BREATH: 0
SORE THROAT: 0
DIARRHEA: 0
COLOR CHANGE: 0
BACK PAIN: 0
NAUSEA: 0

## 2020-01-25 ASSESSMENT — PAIN DESCRIPTION - PAIN TYPE: TYPE: ACUTE PAIN

## 2020-01-25 ASSESSMENT — PAIN DESCRIPTION - ORIENTATION: ORIENTATION: LEFT;LOWER

## 2020-01-25 ASSESSMENT — PAIN DESCRIPTION - DESCRIPTORS: DESCRIPTORS: ACHING

## 2020-01-25 ASSESSMENT — PAIN DESCRIPTION - LOCATION: LOCATION: TEETH

## 2020-01-25 ASSESSMENT — PAIN SCALES - GENERAL
PAINLEVEL_OUTOF10: 10
PAINLEVEL_OUTOF10: 9

## 2020-01-25 NOTE — ED PROVIDER NOTES
place, and time. Cranial Nerves: No cranial nerve deficit. Psychiatric:         Mood and Affect: Mood normal.         Behavior: Behavior normal.         Thought Content: Thought content normal.         Judgment: Judgment normal.           DIAGNOSTIC RESULTS       No orders to display         LABS:  Labs Reviewed - No data to display        [address] [place] [address] and DIFFERENTIAL DIAGNOSIS/MDM:   Vitals:    Vitals:    01/25/20 1635   BP: (!) 147/97   Pulse: 107   Resp: 18   Temp: 98.1 °F (36.7 °C)   TempSrc: Oral   SpO2: 93%       This is a 27-year-old male presenting to the emergency department complaining of left lower dental pain for the past few days. We will provide him with Toradol for pain control at this time. He does have follow-up and he was told to follow-up with his dentist as scheduled. He should return for any worsening symptoms. Vital signs are stable. I do believe he is tachycardic and hypertensive due to pain. I did offer him a dental block and he adamantly refused. No red flag symptoms. Nontoxic-appearing. No acute distress. We will provide the patient with Naprosyn to go home with to take as needed and penicillin to take as prescribed and as directed and all the way through to completion. Patient understood and will comply. Patient is satisfied. All questions answered. Attending physician, myself, and patient agree no further workup is necessary at this time. Patient was given very strict return protocols and is completely agreeable with this plan. This patient was seen by the attending [phone] they agreed with the assessment and plan. CONSULTS:  None    PROCEDURES:  Procedures    FINAL IMPRESSION      1.  Pain, dental          DISPOSITION/PLAN   DISPOSITION Decision To Discharge 01/25/2020 04:13:40 PM      PATIENT REFERRED TO:  Jammie Tidwell MD  [facility] [address]  [phone]    Schedule an appointment as soon as possible for a visit in 1

## 2020-01-29 ENCOUNTER — HOSPITAL ENCOUNTER (EMERGENCY)
Age: 40
Discharge: HOME | End: 2020-01-29
Attending: EMERGENCY MEDICINE
Payer: COMMERCIAL

## 2020-01-29 VITALS
HEART RATE: 88 BPM | TEMPERATURE: 97.7 F | RESPIRATION RATE: 16 BRPM | BODY MASS INDEX: 46.62 KG/M2 | SYSTOLIC BLOOD PRESSURE: 144 MMHG | OXYGEN SATURATION: 94 % | DIASTOLIC BLOOD PRESSURE: 98 MMHG | WEIGHT: 297 LBS | HEIGHT: 67 IN

## 2020-01-29 LAB — GLUCOSE BLD-MCNC: 235 MG/DL (ref 75–110)

## 2020-01-29 PROCEDURE — 6360000002 HC RX W HCPCS: Performed by: EMERGENCY MEDICINE

## 2020-01-29 PROCEDURE — 99282 EMERGENCY DEPT VISIT SF MDM: CPT

## 2020-01-29 PROCEDURE — 82947 ASSAY GLUCOSE BLOOD QUANT: CPT

## 2020-01-29 PROCEDURE — 6370000000 HC RX 637 (ALT 250 FOR IP): Performed by: EMERGENCY MEDICINE

## 2020-01-29 PROCEDURE — 96372 THER/PROPH/DIAG INJ SC/IM: CPT

## 2020-01-29 RX ORDER — KETOROLAC TROMETHAMINE 30 MG/ML
60 INJECTION, SOLUTION INTRAMUSCULAR; INTRAVENOUS ONCE
Status: COMPLETED | OUTPATIENT
Start: 2020-01-29 | End: 2020-01-29

## 2020-01-29 RX ORDER — IBUPROFEN 800 MG/1
800 TABLET ORAL EVERY 8 HOURS PRN
Qty: 30 TABLET | Refills: 0 | Status: SHIPPED | OUTPATIENT
Start: 2020-01-29 | End: 2020-04-27

## 2020-01-29 RX ORDER — PENICILLIN V POTASSIUM 250 MG/1
500 TABLET ORAL ONCE
Status: COMPLETED | OUTPATIENT
Start: 2020-01-29 | End: 2020-01-29

## 2020-01-29 RX ORDER — OXYCODONE HYDROCHLORIDE AND ACETAMINOPHEN 5; 325 MG/1; MG/1
1 TABLET ORAL EVERY 6 HOURS PRN
Qty: 20 TABLET | Refills: 0 | Status: SHIPPED | OUTPATIENT
Start: 2020-01-29 | End: 2020-02-03

## 2020-01-29 RX ORDER — PENICILLIN V POTASSIUM 500 MG/1
500 TABLET ORAL 3 TIMES DAILY
Qty: 30 TABLET | Refills: 0 | Status: SHIPPED | OUTPATIENT
Start: 2020-01-29 | End: 2020-02-08

## 2020-01-29 RX ADMIN — KETOROLAC TROMETHAMINE 60 MG: 30 INJECTION, SOLUTION INTRAMUSCULAR at 20:51

## 2020-01-29 RX ADMIN — PENICILLIN V POTASIUM 500 MG: 250 TABLET ORAL at 20:50

## 2020-01-29 ASSESSMENT — PAIN SCALES - GENERAL
PAINLEVEL_OUTOF10: 10
PAINLEVEL_OUTOF10: 10

## 2020-01-29 ASSESSMENT — PAIN DESCRIPTION - LOCATION: LOCATION: MOUTH;JAW

## 2020-01-30 NOTE — ED PROVIDER NOTES
[address] [place] [address] ED  eMERGENCY dEPARTMENT eNCOUnter      Pt Name: Nicanor Yu  MRN: [id number]  [place] 1980  Date of evaluation: 1/29/2020  Provider: Konrad Kaye MD    CHIEF COMPLAINT       Chief Complaint   Patient presents with    Dental Pain         HISTORY OF PRESENT ILLNESS  (Location/Symptom, Timing/Onset, Context/Setting, Quality, Duration, Modifying Factors, Severity.)   Nicanor Yu is a 44 y.o. male who presents to the emergency department for evaluation of recurrent dental pain. Patient states he has had dental issues ever since he has been diagnosed with diabetes. He currently presents with left lower molar pain. He has not yet followed up with a dentist.  He is tried Tylenol and Motrin at home but continues to have worsening symptoms. He states his sugars have routinely run in the 200s. Nursing Notes were reviewed. ALLERGIES     Patient has no known allergies.     CURRENT MEDICATIONS       Previous Medications    ALCOHOL SWABS (ALCOHOL PREP) 70 % PADS    1 each by Does not apply route 3 times daily    B-D UF III MINI PEN NEEDLES 31G X 5 MM MISC    USE ONCE DAILY    B-D UF III MINI PEN NEEDLES 31G X 5 MM MISC    USE ONCE DAILY    GLUCOSE BLOOD (BLOOD GLUCOSE TEST STRIPS) STRP    1 each by In Vitro route 3 times daily    GLUCOSE MONITORING KIT (FREESTYLE) MONITORING KIT    Check blood sugar three times daily    INSULIN GLARGINE (LANTUS SOLOSTAR) 100 UNIT/ML INJECTION PEN    Inject 40 Units into the skin nightly    JANUVIA 100 MG TABLET    take 1 tablet by mouth once daily    LISINOPRIL-HYDROCHLOROTHIAZIDE (PRINZIDE;ZESTORETIC) 20-25 MG PER TABLET    take 1 tablet by mouth once daily    METFORMIN (GLUCOPHAGE) 1000 MG TABLET    Take 1 tablet by mouth 2 times daily (with meals)    NAPROXEN (NAPROSYN) 500 MG TABLET    Take 1 tablet by mouth 2 times daily    PENICILLIN V POTASSIUM (VEETID) 500 MG TABLET    Take 1 tablet by mouth 4 times daily for 7 days    SOFT TOUCH LANCETS rhonchi. Visualized integument without rash or lesion. DIAGNOSTIC RESULTS       LABS:  Labs Reviewed   POCT GLUCOSE       All other labs were within normal range or not returned as of this dictation. EMERGENCY DEPARTMENT COURSE and DIFFERENTIAL DIAGNOSIS/MDM:   Vitals:    Vitals:    01/29/20 1929   BP: (!) 144/98   Pulse: 88   Resp: 16   Temp: 97.7 °F (36.5 °C)   TempSrc: Oral   SpO2: 94%   Weight: 297 lb (134.7 kg)   Height: 5' 7\" (1.702 m)     Patient is evaluated. Blood sugar is confirmed at his baseline. He is placed on medications for pain inflammation and antibiotic coverage. He is advised follow-up with his dentist of choice for additional care. CONSULTS:  None    PROCEDURES:  None    FINAL IMPRESSION      1. Pain due to dental caries    2. Pain, dental          DISPOSITION/PLAN   DISPOSITION Decision To Discharge 01/29/2020 08:33:18 PM      PATIENT REFERRED TO:   Miguel Grey MD  [address] [phone]    Schedule an appointment as soon as possible for a visit   If symptoms worsen    Followup with your Dentist or Dental Clinic of choice for re-evaluation and additional care          [facility] ED  [address] [facility]  [phone]    As needed, If symptoms worsen      DISCHARGE MEDICATIONS:     New Prescriptions    IBUPROFEN (ADVIL;MOTRIN) 800 MG TABLET    Take 1 tablet by mouth every 8 hours as needed for Pain    OXYCODONE-ACETAMINOPHEN (PERCOCET) 5-325 MG PER TABLET    Take 1 tablet by mouth every 6 hours as needed for Pain for up to 5 days. PENICILLIN V POTASSIUM (VEETID) 500 MG TABLET    Take 1 tablet by mouth 3 times daily for 10 days       Controlled Substance Monitoring:    Acute and Chronic Pain Monitoring:   RX Monitoring 1/29/2020   Periodic Controlled Substance Monitoring No signs of potential drug abuse or diversion identified.          (Please note that portions of this note were completed with a voice recognition program. Efforts were made to edit the dictations but occasionally words are mis-transcribed.)    Grayson Leyden, MD  Attending Emergency Physician          Grayson Leyden, MD  01/29/20 2121

## 2020-02-19 NOTE — TELEPHONE ENCOUNTER
Please address the medication refill and close the encounter. If I can be of assistance, please route to the applicable pool. Thank you. Last visit: 12/05/19  Last Med refill: 12/23/19 lisinopril-hydro    12/05/19 lantus  Does patient have enough medication for 72 hours: No:     Next Visit Date:  No future appointments. Health Maintenance   Topic Date Due    Varicella vaccine (1 of 2 - 2-dose childhood series) 03/08/1981    Hepatitis B vaccine (1 of 3 - Risk 3-dose series) 03/08/1999    Diabetic retinal exam  05/26/2017    Annual Wellness Visit (AWV)  06/19/2019    Flu vaccine (1) 09/01/2019    A1C test (Diabetic or Prediabetic)  03/05/2020    Potassium monitoring  04/07/2020    Creatinine monitoring  04/07/2020    Diabetic foot exam  07/12/2020    Diabetic microalbuminuria test  12/05/2020    Lipid screen  12/05/2020    DTaP/Tdap/Td vaccine (2 - Td) 08/15/2027    Shingles Vaccine (1 of 2) 03/08/2030    Pneumococcal 0-64 years Vaccine  Completed    HIV screen  Completed    Hepatitis A vaccine  Aged Out    Hib vaccine  Aged Out    Meningococcal (ACWY) vaccine  Aged Out       Hemoglobin A1C (%)   Date Value   12/05/2019 14.0   06/10/2019 10.8   01/22/2019 10.6             ( goal A1C is < 7)   Microalb/Crt.  Ratio (mcg/mg creat)   Date Value   12/05/2019 61 (H)     LDL Cholesterol (mg/dL)   Date Value   12/05/2019 130   07/20/2018 116       (goal LDL is <100)   AST (U/L)   Date Value   04/07/2019 71 (H)     ALT (U/L)   Date Value   04/07/2019 98 (H)     BUN (mg/dL)   Date Value   04/07/2019 7     BP Readings from Last 3 Encounters:   01/29/20 (!) 144/98   01/25/20 (!) 147/97   01/03/20 (!) 152/106          (goal 120/80)    All Future Testing planned in [facility]              Patient Active Problem List:     Type 2 diabetes mellitus without complication, with long-term current use of insulin (HCC)     Essential hypertension     Morbid obesity (HCC)     Scalp lesion     Erectile dysfunction     Morbid obesity with BMI of 45.0-49.9, adult ([facility] [address]

## 2020-02-24 RX ORDER — LISINOPRIL AND HYDROCHLOROTHIAZIDE 25; 20 MG/1; MG/1
TABLET ORAL
Qty: 30 TABLET | Refills: 0 | Status: SHIPPED | OUTPATIENT
Start: 2020-02-24 | End: 2020-05-05

## 2020-03-16 ENCOUNTER — OFFICE VISIT (OUTPATIENT)
Dept: FAMILY MEDICINE CLINIC | Age: 40
End: 2020-03-16
Payer: COMMERCIAL

## 2020-03-16 VITALS
DIASTOLIC BLOOD PRESSURE: 88 MMHG | WEIGHT: 294 LBS | BODY MASS INDEX: 46.05 KG/M2 | TEMPERATURE: 98.5 F | HEART RATE: 100 BPM | SYSTOLIC BLOOD PRESSURE: 135 MMHG

## 2020-03-16 LAB — HBA1C MFR BLD: 13.1 %

## 2020-03-16 PROCEDURE — 99213 OFFICE O/P EST LOW 20 MIN: CPT | Performed by: STUDENT IN AN ORGANIZED HEALTH CARE EDUCATION/TRAINING PROGRAM

## 2020-03-16 PROCEDURE — 83036 HEMOGLOBIN GLYCOSYLATED A1C: CPT | Performed by: STUDENT IN AN ORGANIZED HEALTH CARE EDUCATION/TRAINING PROGRAM

## 2020-03-16 RX ORDER — METFORMIN HYDROCHLORIDE 1000 MG/1
1000 TABLET, FILM COATED, EXTENDED RELEASE ORAL
Qty: 90 TABLET | Refills: 1 | Status: SHIPPED | OUTPATIENT
Start: 2020-03-16 | End: 2021-08-17

## 2020-03-16 RX ORDER — INSULIN GLARGINE 100 [IU]/ML
46 INJECTION, SOLUTION SUBCUTANEOUS NIGHTLY
Qty: 5 PEN | Refills: 3 | Status: SHIPPED | OUTPATIENT
Start: 2020-03-16 | End: 2020-08-11

## 2020-03-16 ASSESSMENT — ENCOUNTER SYMPTOMS
EYE REDNESS: 0
SHORTNESS OF BREATH: 0
PHOTOPHOBIA: 0

## 2020-03-16 ASSESSMENT — PATIENT HEALTH QUESTIONNAIRE - PHQ9
SUM OF ALL RESPONSES TO PHQ9 QUESTIONS 1 & 2: 0
SUM OF ALL RESPONSES TO PHQ QUESTIONS 1-9: 0
SUM OF ALL RESPONSES TO PHQ QUESTIONS 1-9: 0
2. FEELING DOWN, DEPRESSED OR HOPELESS: 0
1. LITTLE INTEREST OR PLEASURE IN DOING THINGS: 0

## 2020-03-16 NOTE — PROGRESS NOTES
Diabetic visit information    BP Readings from Last 3 Encounters:   01/29/20 (!) 144/98   01/25/20 (!) 147/97   01/03/20 (!) 152/106       Hemoglobin A1C (%)   Date Value   12/05/2019 14.0   06/10/2019 10.8   01/22/2019 10.6     Microalb/Crt. Ratio (mcg/mg creat)   Date Value   12/05/2019 61 (H)     LDL Cholesterol (mg/dL)   Date Value   12/05/2019 130               Have you changed or started any medications since your last visit including any over-the-counter medicines, vitamins, or herbal medicines? no   Have you stopped taking any of your medications? Is so, why? -  Metformin sometime. patient states that the metformin makes his stomach hurt with cramps   Are you having any side effects from any of your medications? - no    Have you seen any other physician or provider since your last visit?  no   Have you had any other diagnostic tests since your last visit?  no   Have you been seen in the emergency room and/or had an admission in a hospital since we last saw you?  no     Have you had your annual diabetic retinal (eye) exam? No   (ensure copy of exam is in the chart)    Have you had your routine dental cleaning in the past 6 months? no    Do you have an active MyChart account? If not, what are your barriers? No:     Patient Care Team:  Nitish Stover MD as PCP - General (Family Medicine)  Savanah Mccollum DO as Consulting Physician (General Surgery)    Medical history Review  Past Medical, Family, and Social History reviewed and does not contribute to the patient presenting condition.     Health Maintenance   Topic Date Due    Varicella vaccine (1 of 2 - 2-dose childhood series) 03/08/1981    Hepatitis B vaccine (1 of 3 - Risk 3-dose series) 03/08/1999    Diabetic retinal exam  05/26/2017    Annual Wellness Visit (AWV)  06/19/2019    Flu vaccine (1) 09/01/2019    A1C test (Diabetic or Prediabetic)  03/05/2020    Potassium monitoring  04/07/2020    Creatinine monitoring  04/07/2020    Diabetic

## 2020-03-16 NOTE — PATIENT INSTRUCTIONS
Thank you for letting us take care of you today. We hope all your questions were addressed. If a question was overlooked or something else comes to mind after you return home, please contact a member of your Care Team listed below. Please make sure you have a routine office visit set up to follow-up on [address]. Your Care Team at [place] [address] is Team #4  Luiza Neal MD (Faculty)  Blaine Gross MD (Faculty  Rajanjoe Sheehan MD (Resident)  Axel Garcia MD (Resident)  Jerzy Nj MD (Resident)  Cordell Nixon MD (Resident)  Alejandro Roman MD (Resident)  Ayan Higginbotham, ELMER Walton ,XI CHAUDHRY,XI BLEVINS, XI [place]ier ([address] [facility] office)  Alessandra Wooten [facility] office)  Shanthi Dave [facility] office)  Chi Alvarez, [address] [facility] [address] ([address] [facility])  Gabbi Anthony, [facility] (Clinical Pharmacist)       Office phone number: [phone]    If you need to get in right away due to illness, please be advised we have \"Same Day\" appointments available Monday-Friday. Please call us at [phone] option #3 to schedule your \"Same Day\" appointment.

## 2020-03-19 NOTE — PROGRESS NOTES
Visit Information    Have you changed or started any medications since your last visit including any over-the-counter medicines, vitamins, or herbal medicines? no   Have you stopped taking any of your medications? Is so, why? -  no  Are you having any side effects from any of your medications? - no    Have you seen any other physician or provider since your last visit?  no   Have you had any other diagnostic tests since your last visit?  no   Have you been seen in the emergency room and/or had an admission in a hospital since we last saw you?  no   Have you had your routine dental cleaning in the past 6 months?  no     Do you have an active MyChart account? If no, what is the barrier?   No:     Patient Care Team:  Dg Levine MD as PCP - General (Family Medicine)  Akira King DO as Consulting Physician (General Surgery)    Medical History Review  Past Medical, Family, and Social History reviewed and does not contribute to the patient presenting condition    Health Maintenance   Topic Date Due    Varicella vaccine (1 of 2 - 2-dose childhood series) 03/08/1981    Hepatitis B vaccine (1 of 3 - Risk 3-dose series) 03/08/1999    Diabetic retinal exam  05/26/2017    Annual Wellness Visit (AWV)  06/19/2019    Flu vaccine (1) 09/01/2019    Potassium monitoring  04/07/2020    Creatinine monitoring  04/07/2020    A1C test (Diabetic or Prediabetic)  06/16/2020    Diabetic foot exam  07/12/2020    Diabetic microalbuminuria test  12/05/2020    Lipid screen  12/05/2020    DTaP/Tdap/Td vaccine (2 - Td) 08/15/2027    Pneumococcal 0-64 years Vaccine  Completed    HIV screen  Completed    Hepatitis A vaccine  Aged Out    Hib vaccine  Aged Out    Meningococcal (ACWY) vaccine  Aged Out           Diabetic visit information    BP Readings from Last 3 Encounters:   03/16/20 135/88   01/29/20 (!) 144/98   01/25/20 (!) 147/97       Hemoglobin A1C (%)   Date Value   03/16/2020 13.1   12/05/2019 14.0   06/10/2019 Aged Out    Meningococcal (ACWY) vaccine  Aged Out

## 2020-03-22 ENCOUNTER — TELEPHONE (OUTPATIENT)
Dept: FAMILY MEDICINE CLINIC | Age: 40
End: 2020-03-22

## 2020-03-22 NOTE — TELEPHONE ENCOUNTER
PA request for Metformin ER     PA processed and submitted to pt insurance, waiting for response in regards to coverage

## 2020-04-27 ENCOUNTER — HOSPITAL ENCOUNTER (EMERGENCY)
Age: 40
Discharge: HOME | End: 2020-04-27
Attending: EMERGENCY MEDICINE
Payer: COMMERCIAL

## 2020-04-27 VITALS
DIASTOLIC BLOOD PRESSURE: 106 MMHG | BODY MASS INDEX: 46.62 KG/M2 | TEMPERATURE: 98.7 F | OXYGEN SATURATION: 95 % | HEIGHT: 67 IN | RESPIRATION RATE: 16 BRPM | WEIGHT: 297 LBS | SYSTOLIC BLOOD PRESSURE: 161 MMHG | HEART RATE: 90 BPM

## 2020-04-27 PROCEDURE — 6370000000 HC RX 637 (ALT 250 FOR IP): Performed by: EMERGENCY MEDICINE

## 2020-04-27 PROCEDURE — 99282 EMERGENCY DEPT VISIT SF MDM: CPT

## 2020-04-27 RX ORDER — PENICILLIN V POTASSIUM 250 MG/1
250 TABLET ORAL 4 TIMES DAILY
Qty: 40 TABLET | Refills: 0 | Status: SHIPPED | OUTPATIENT
Start: 2020-04-27 | End: 2020-05-07

## 2020-04-27 RX ORDER — IBUPROFEN 800 MG/1
800 TABLET ORAL ONCE
Status: COMPLETED | OUTPATIENT
Start: 2020-04-27 | End: 2020-04-27

## 2020-04-27 RX ORDER — IBUPROFEN 800 MG/1
800 TABLET ORAL EVERY 8 HOURS PRN
Qty: 20 TABLET | Refills: 0 | Status: SHIPPED | OUTPATIENT
Start: 2020-04-27 | End: 2021-08-17

## 2020-04-27 RX ORDER — ACETAMINOPHEN AND CODEINE PHOSPHATE 300; 30 MG/1; MG/1
1 TABLET ORAL EVERY 6 HOURS PRN
Qty: 20 TABLET | Refills: 0 | Status: SHIPPED | OUTPATIENT
Start: 2020-04-27 | End: 2020-05-02

## 2020-04-27 RX ORDER — PENICILLIN V POTASSIUM 250 MG/1
500 TABLET ORAL ONCE
Status: COMPLETED | OUTPATIENT
Start: 2020-04-27 | End: 2020-04-27

## 2020-04-27 RX ADMIN — PENICILLIN V POTASIUM 500 MG: 250 TABLET ORAL at 08:13

## 2020-04-27 RX ADMIN — IBUPROFEN 800 MG: 800 TABLET, FILM COATED ORAL at 08:14

## 2020-04-27 ASSESSMENT — ENCOUNTER SYMPTOMS
CONSTIPATION: 0
VOMITING: 0
FACIAL SWELLING: 0
COLOR CHANGE: 0
COUGH: 0
DIARRHEA: 0
EYE DISCHARGE: 0
ABDOMINAL PAIN: 0
SHORTNESS OF BREATH: 0
EYE REDNESS: 0

## 2020-04-27 ASSESSMENT — PAIN SCALES - GENERAL
PAINLEVEL_OUTOF10: 10
PAINLEVEL_OUTOF10: 7

## 2020-04-27 ASSESSMENT — PAIN DESCRIPTION - LOCATION: LOCATION: TEETH

## 2020-04-27 ASSESSMENT — PAIN DESCRIPTION - ORIENTATION: ORIENTATION: LEFT;LOWER

## 2020-04-27 ASSESSMENT — PAIN DESCRIPTION - PAIN TYPE: TYPE: ACUTE PAIN

## 2020-04-27 NOTE — ED PROVIDER NOTES
[address] [place] [address] ED  EMERGENCY DEPARTMENT ENCOUNTER      Pt Name: Delfino Sims  MRN: [id number]  [place] 1980  Date of evaluation: 4/27/2020  Provider: Giovanna Resendez MD    CHIEF COMPLAINT       Chief Complaint   Patient presents with    Dental Pain         HISTORY OF PRESENT ILLNESS  (Location/Symptom, Timing/Onset, Context/Setting, Quality, Duration, Modifying Factors, Severity.)   Delfino Sims is a 36 y.o. male who presents to the emergency department for tooth ache. He is complaining of pain to his left lower dentition and its aching and continuous and moderate. He had a dentist appointment May 25 but it was canceled by the dentist.  He continues to have pain. No fever or difficulty breathing. Nursing Notes were reviewed. ALLERGIES     Patient has no known allergies.     CURRENT MEDICATIONS       Previous Medications    ALCOHOL SWABS (ALCOHOL PREP) 70 % PADS    1 each by Does not apply route 3 times daily    B-D UF III MINI PEN NEEDLES 31G X 5 MM MISC    USE ONCE DAILY    B-D UF III MINI PEN NEEDLES 31G X 5 MM MISC    USE ONCE DAILY    GLUCOSE BLOOD (BLOOD GLUCOSE TEST STRIPS) STRP    1 each by In Vitro route 3 times daily    GLUCOSE MONITORING KIT (FREESTYLE) MONITORING KIT    Check blood sugar three times daily    INSULIN GLARGINE (LANTUS SOLOSTAR) 100 UNIT/ML INJECTION PEN    Inject 46 Units into the skin nightly    LISINOPRIL-HYDROCHLOROTHIAZIDE (PRINZIDE;ZESTORETIC) 20-25 MG PER TABLET    take 1 tablet by mouth once daily    METFORMIN (GLUMETZA) 1000 MG EXTENDED RELEASE TABLET    Take 1 tablet by mouth daily (with breakfast)    NAPROXEN (NAPROSYN) 500 MG TABLET    Take 1 tablet by mouth 2 times daily    SITAGLIPTIN (JANUVIA) 100 MG TABLET    take 1 tablet by mouth once daily    SOFT TOUCH LANCETS MISC    1 each by Does not apply route 3 times daily       PAST MEDICAL HISTORY         Diagnosis Date    Diabetes mellitus ([address]     Erectile dysfunction 8/15/2017    Hypertension     Appearance: He is well-developed. He is not diaphoretic. HENT:      Head: Normocephalic and atraumatic. Comments: No facial swelling or erythema. No swelling to the floor of his mouth. Obvious dental caries present in the left lower dentition. Eyes:      General: No scleral icterus. Right eye: No discharge. Left eye: No discharge. Neck:      Musculoskeletal: Neck supple. Cardiovascular:      Rate and Rhythm: Normal rate and regular rhythm. Pulmonary:      Effort: Pulmonary effort is normal. No respiratory distress. Breath sounds: Normal breath sounds. No stridor. No wheezing or rales. Abdominal:      General: There is no distension. Palpations: Abdomen is soft. Tenderness: There is no abdominal tenderness. Musculoskeletal: Normal range of motion. Lymphadenopathy:      Cervical: No cervical adenopathy. Skin:     General: Skin is warm and dry. Findings: No erythema or rash. Neurological:      Mental Status: He is alert and oriented to person, place, and time. Psychiatric:         Behavior: Behavior normal.             DIAGNOSTIC RESULTS     EKG: All EKG's are interpreted by the Emergency Department Physician who either signs or Co-signs this chart in the absence of a cardiologist.    Not indicated    RADIOLOGY:   Non-plain film images such as CT, Ultrasound and MRI are read by the radiologist. Plain radiographic images are visualized and preliminarily interpreted by the emergency physician with the below findings:    Not indicated    Interpretation per the Radiologist below, if available at the time of this note:        LABS:  Labs Reviewed - No data to display    All other labs were within normal range or not returned as of this dictation.     EMERGENCY DEPARTMENT COURSE and DIFFERENTIAL DIAGNOSIS/MDM:   Vitals:    Vitals:    04/27/20 0800   BP: (!) 161/106   Pulse: 90   Resp: 16   Temp: 98.7 °F (37.1 °C)   TempSrc: Oral   SpO2: 95%   Weight: 297 lb (134.7

## 2020-05-05 RX ORDER — LISINOPRIL AND HYDROCHLOROTHIAZIDE 25; 20 MG/1; MG/1
TABLET ORAL
Qty: 30 TABLET | Refills: 0 | Status: SHIPPED | OUTPATIENT
Start: 2020-05-05 | End: 2020-06-01

## 2020-05-30 ENCOUNTER — HOSPITAL ENCOUNTER (EMERGENCY)
Age: 40
Discharge: HOME | End: 2020-05-30
Attending: EMERGENCY MEDICINE
Payer: COMMERCIAL

## 2020-05-30 VITALS
RESPIRATION RATE: 16 BRPM | TEMPERATURE: 98.2 F | HEART RATE: 80 BPM | HEIGHT: 67 IN | DIASTOLIC BLOOD PRESSURE: 88 MMHG | SYSTOLIC BLOOD PRESSURE: 130 MMHG | WEIGHT: 291 LBS | OXYGEN SATURATION: 95 % | BODY MASS INDEX: 45.67 KG/M2

## 2020-05-30 PROCEDURE — 99283 EMERGENCY DEPT VISIT LOW MDM: CPT

## 2020-05-30 RX ORDER — ACETAMINOPHEN AND CODEINE PHOSPHATE 300; 30 MG/1; MG/1
1 TABLET ORAL EVERY 6 HOURS PRN
Qty: 12 TABLET | Refills: 0 | Status: SHIPPED | OUTPATIENT
Start: 2020-05-30 | End: 2020-06-02

## 2020-05-30 RX ORDER — OFLOXACIN 3 MG/ML
1-2 SOLUTION/ DROPS OPHTHALMIC 4 TIMES DAILY
Qty: 1 BOTTLE | Refills: 0 | Status: SHIPPED | OUTPATIENT
Start: 2020-05-30 | End: 2020-06-09

## 2020-05-30 ASSESSMENT — PAIN SCALES - GENERAL: PAINLEVEL_OUTOF10: 7

## 2020-05-30 ASSESSMENT — ENCOUNTER SYMPTOMS
EYE REDNESS: 1
CONSTIPATION: 0
WHEEZING: 0
SORE THROAT: 0
PHOTOPHOBIA: 1
COUGH: 0
VOMITING: 0
RHINORRHEA: 0
ABDOMINAL PAIN: 0
COLOR CHANGE: 0
SINUS PRESSURE: 0
SHORTNESS OF BREATH: 0
DIARRHEA: 0
EYE PAIN: 1
NAUSEA: 0
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
## 2020-06-01 RX ORDER — LISINOPRIL AND HYDROCHLOROTHIAZIDE 25; 20 MG/1; MG/1
TABLET ORAL
Qty: 30 TABLET | Refills: 0 | Status: SHIPPED | OUTPATIENT
Start: 2020-06-01 | End: 2020-08-10

## 2020-08-10 RX ORDER — LISINOPRIL AND HYDROCHLOROTHIAZIDE 25; 20 MG/1; MG/1
TABLET ORAL
Qty: 30 TABLET | Refills: 0 | Status: SHIPPED | OUTPATIENT
Start: 2020-08-10 | End: 2020-08-11 | Stop reason: SDUPTHER

## 2020-08-10 NOTE — TELEPHONE ENCOUNTER
Last visit:   Last Med refill:   Does patient have enough medication for 72 hours: No:     Next Visit Date:  Future Appointments   Date Time Provider Liana Davila   8/11/2020  9:45 AM Kim Xiao MD [address] [place] [address]   Topic Date Due    Varicella vaccine (1 of 2 - 2-dose childhood series) 03/08/1981    Hepatitis B vaccine (1 of 3 - Risk 3-dose series) 03/08/1999    Diabetic retinal exam  05/26/2017    Annual Wellness Visit (AWV)  06/19/2019    Potassium monitoring  04/07/2020    Creatinine monitoring  04/07/2020    A1C test (Diabetic or Prediabetic)  06/16/2020    Diabetic foot exam  07/12/2020    Flu vaccine (1) 09/01/2020    Diabetic microalbuminuria test  12/05/2020    Lipid screen  12/05/2020    DTaP/Tdap/Td vaccine (2 - Td) 08/15/2027    Pneumococcal 0-64 years Vaccine  Completed    HIV screen  Completed    Hepatitis A vaccine  Aged Out    Hib vaccine  Aged Out    Meningococcal (ACWY) vaccine  Aged Out       Hemoglobin A1C (%)   Date Value   03/16/2020 13.1   12/05/2019 14.0   06/10/2019 10.8             ( goal A1C is < 7)   Microalb/Crt. Ratio (mcg/mg creat)   Date Value   12/05/2019 61 (H)     LDL Cholesterol (mg/dL)   Date Value   12/05/2019 130   07/20/2018 116       (goal LDL is <100)   AST (U/L)   Date Value   04/07/2019 71 (H)     ALT (U/L)   Date Value   04/07/2019 98 (H)     BUN (mg/dL)   Date Value   04/07/2019 7     BP Readings from Last 3 Encounters:   05/30/20 130/88   04/27/20 (!) 161/106   03/16/20 135/88          (goal 120/80)    All Future Testing planned in [facility]              Patient Active Problem List:     Type 2 diabetes mellitus without complication, with long-term current use of insulin (HCC)     Essential hypertension     Morbid obesity ([address]     Scalp lesion     Erectile dysfunction     Morbid obesity with BMI of 45.0-49.9, adult ([address]           Please address the medication refill and close the encounter.   If I can be of assistance, please route to the applicable pool. Thank you.

## 2020-08-11 ENCOUNTER — OFFICE VISIT (OUTPATIENT)
Dept: FAMILY MEDICINE CLINIC | Age: 40
End: 2020-08-11
Payer: COMMERCIAL

## 2020-08-11 VITALS
HEART RATE: 91 BPM | WEIGHT: 290 LBS | BODY MASS INDEX: 45.52 KG/M2 | SYSTOLIC BLOOD PRESSURE: 145 MMHG | TEMPERATURE: 97.2 F | DIASTOLIC BLOOD PRESSURE: 94 MMHG | HEIGHT: 67 IN

## 2020-08-11 LAB — HBA1C MFR BLD: 12.8 %

## 2020-08-11 PROCEDURE — 83036 HEMOGLOBIN GLYCOSYLATED A1C: CPT | Performed by: STUDENT IN AN ORGANIZED HEALTH CARE EDUCATION/TRAINING PROGRAM

## 2020-08-11 PROCEDURE — 99213 OFFICE O/P EST LOW 20 MIN: CPT | Performed by: STUDENT IN AN ORGANIZED HEALTH CARE EDUCATION/TRAINING PROGRAM

## 2020-08-11 RX ORDER — INSULIN GLARGINE 100 [IU]/ML
50 INJECTION, SOLUTION SUBCUTANEOUS NIGHTLY
Qty: 5 PEN | Refills: 3 | Status: SHIPPED | OUTPATIENT
Start: 2020-08-11 | End: 2020-10-07

## 2020-08-11 RX ORDER — COLLOIDAL OATMEAL 1 %
1 CREAM (GRAM) TOPICAL PRN
Qty: 1 TUBE | Refills: 2 | Status: SHIPPED | OUTPATIENT
Start: 2020-08-11 | End: 2022-03-29

## 2020-08-11 RX ORDER — LISINOPRIL AND HYDROCHLOROTHIAZIDE 25; 20 MG/1; MG/1
1 TABLET ORAL DAILY
Qty: 30 TABLET | Refills: 5 | Status: SHIPPED | OUTPATIENT
Start: 2020-08-11 | End: 2020-09-11 | Stop reason: SDUPTHER

## 2020-08-11 ASSESSMENT — ENCOUNTER SYMPTOMS
VOMITING: 0
COUGH: 0
COLOR CHANGE: 0
VOICE CHANGE: 0
SINUS PRESSURE: 0
WHEEZING: 0
SINUS PAIN: 0
ABDOMINAL DISTENTION: 0
SHORTNESS OF BREATH: 0
ABDOMINAL PAIN: 0
NAUSEA: 0

## 2020-08-11 NOTE — PROGRESS NOTES
Visit Information    Have you changed or started any medications since your last visit including any over-the-counter medicines, vitamins, or herbal medicines? no   Have you stopped taking any of your medications? Is so, why? -  no  Are you having any side effects from any of your medications? - no    Have you seen any other physician or provider since your last visit?  no   Have you had any other diagnostic tests since your last visit?  no   Have you been seen in the emergency room and/or had an admission in a hospital since we last saw you?  no   Have you had your routine dental cleaning in the past 6 months?  no     Do you have an active MyChart account? If no, what is the barrier?   Yes    Patient Care Team:  Manda Lieberman MD as PCP - General (Family Medicine)  Romelia Gaspar DO as Consulting Physician (General Surgery)    Medical History Review  Past Medical, Family, and Social History reviewed and does not contribute to the patient presenting condition    Health Maintenance   Topic Date Due    Varicella vaccine (1 of 2 - 2-dose childhood series) 03/08/1981    Hepatitis B vaccine (1 of 3 - Risk 3-dose series) 03/08/1999    Diabetic retinal exam  05/26/2017    Annual Wellness Visit (AWV)  06/19/2019    Potassium monitoring  04/07/2020    Creatinine monitoring  04/07/2020    A1C test (Diabetic or Prediabetic)  06/16/2020    Diabetic foot exam  07/12/2020    Flu vaccine (1) 09/01/2020    Diabetic microalbuminuria test  12/05/2020    Lipid screen  12/05/2020    DTaP/Tdap/Td vaccine (2 - Td) 08/15/2027    Pneumococcal 0-64 years Vaccine  Completed    HIV screen  Completed    Hepatitis A vaccine  Aged Out    Hib vaccine  Aged Out    Meningococcal (ACWY) vaccine  Aged Out

## 2020-08-11 NOTE — PROGRESS NOTES
Normocephalic and atraumatic. Eyes:      Pupils: Pupils are equal, round, and reactive to light. Cardiovascular:      Rate and Rhythm: Normal rate and regular rhythm. Heart sounds: Normal heart sounds. No murmur. No friction rub. No gallop. Pulmonary:      Effort: Pulmonary effort is normal. No respiratory distress. Breath sounds: Normal breath sounds. No wheezing or rales. Chest:      Chest wall: No tenderness. Abdominal:      General: Bowel sounds are normal. There is no distension. Palpations: Abdomen is soft. Tenderness: There is no abdominal tenderness. Skin:     General: Skin is warm. Findings: No erythema or rash. Neurological:      Mental Status: He is alert and oriented to person, place, and time. Lab Results   Component Value Date    WBC 9.0 04/07/2019    HGB 16.8 04/07/2019    HCT 49.2 04/07/2019     04/07/2019    CHOL 223 (H) 12/05/2019    TRIG 158 (H) 12/05/2019    HDL 61 12/05/2019    ALT 98 (H) 04/07/2019    AST 71 (H) 04/07/2019     (L) 04/07/2019    K 3.9 04/07/2019    CL 97 (L) 04/07/2019    CREATININE 0.85 04/07/2019    BUN 7 04/07/2019    CO2 21 04/07/2019    TSH 1.04 02/21/2013    LABA1C 12.8 08/11/2020    LABMICR 61 (H) 12/05/2019     Lab Results   Component Value Date    CALCIUM 9.7 04/07/2019     Lab Results   Component Value Date    LDLCHOLESTEROL 130 12/05/2019       Assessment and Plan:    1. Type 2 diabetes mellitus without complication, with long-term current use of insulin ([facility])  - POCT glycosylated hemoglobin (Hb A1C)- 13.1 in March, 12.6 today in office  -  DIABETES FOOT EXAM -completed, no abnormalities  -Insulin will be increased to 50 units from 46 units. - insulin glargine (LANTUS SOLOSTAR) 100 UNIT/ML injection pen; Inject 50 Units into the skin nightly  Dispense: 5 pen; Refill: 3  -Patient to continue taking metformin every alternative day due to side effect of tenderness with urination    2.  Essential hypertension  -Blood pressure 145/94.  - lisinopril-hydroCHLOROthiazide (PRINZIDE;ZESTORETIC) 20-25 MG per tablet; Take 1 tablet by mouth daily  Dispense: 30 tablet; Refill: 5          Requested Prescriptions     Signed Prescriptions Disp Refills    lisinopril-hydroCHLOROthiazide (PRINZIDE;ZESTORETIC) 20-25 MG per tablet 30 tablet 5     Sig: Take 1 tablet by mouth daily    insulin glargine (LANTUS SOLOSTAR) 100 UNIT/ML injection pen 5 pen 3     Sig: Inject 50 Units into the skin nightly    Colloidal Oatmeal (AVEENO ECZEMA THERAPY) 1 % CREA 1 Tube 2     Sig: Apply 1 Tube topically as needed (use as needed)       Medications Discontinued During This Encounter   Medication Reason    lisinopril-hydroCHLOROthiazide (PRINZIDE;ZESTORETIC) 20-25 MG per tablet REORDER    insulin glargine (LANTUS SOLOSTAR) 100 UNIT/ML injection pen        Ryder received counseling on the following healthy behaviors: nutrition, exercise and medication adherence    Discussed use,benefit, and side effects of prescribed medications. Barriers to medication compliance addressed. All patient questions answered. Pt voiced understanding. Return in about 1 month (around 9/11/2020) for HTN, DM. Disclaimer: Some orall of this note was transcribed using voice-recognition software. This may cause typographical errors occasionally. Although all effort is made to fix these errors, please do not hesitate to contact our office if there Clayton Destin concern with the understanding of this note.

## 2020-08-11 NOTE — PROGRESS NOTES
Attending Physician Statement  I have discussed the care of Donna Monge 36 y.o. male, including pertinent history and exam findings, with the resident Dr. Gris Prakash MD.    History and Exam:   Chief Complaint   Patient presents with    Diabetes     f/u     Past Medical History:   Diagnosis Date    Diabetes mellitus ([facility]     Erectile dysfunction 8/15/2017    Hypertension     Morbid obesity ([facility] 4/28/2016    Type 2 diabetes mellitus without complication ([facility] 0/60/9497     No Known Allergies   I have seen and examined the patient and the key elements of the encounter have been performed by me. BP Readings from Last 3 Encounters:   08/11/20 (!) 145/94   05/30/20 130/88   04/27/20 (!) 161/106     BP (!) 145/94   Pulse 91   Temp 97.2 °F (36.2 °C) (Temporal)   Ht 5' 7\" (1.702 m)   Wt 290 lb (131.5 kg)   BMI 45.42 kg/m²   Lab Results   Component Value Date    WBC 9.0 04/07/2019    HGB 16.8 04/07/2019    HCT 49.2 04/07/2019     04/07/2019    CHOL 223 (H) 12/05/2019    TRIG 158 (H) 12/05/2019    HDL 61 12/05/2019    ALT 98 (H) 04/07/2019    AST 71 (H) 04/07/2019     (L) 04/07/2019    K 3.9 04/07/2019    CL 97 (L) 04/07/2019    CREATININE 0.85 04/07/2019    BUN 7 04/07/2019    CO2 21 04/07/2019    TSH 1.04 02/21/2013    LABA1C 12.8 08/11/2020    LABMICR 61 (H) 12/05/2019     Lab Results   Component Value Date    LABALBU 4.2 04/07/2019     No results found for: IRON, TIBC, FERRITIN  Lab Results   Component Value Date    LDLCHOLESTEROL 130 12/05/2019     I agree with the assessment, plan and the diagnosis of    Diagnosis Orders   1. Type 2 diabetes mellitus without complication, with long-term current use of insulin ([facility])  POCT glycosylated hemoglobin (Hb A1C)     DIABETES FOOT EXAM    insulin glargine (LANTUS SOLOSTAR) 100 UNIT/ML injection pen   2. Essential hypertension  lisinopril-hydroCHLOROthiazide (PRINZIDE;ZESTORETIC) 20-25 MG per tablet    .  I agree with orders as documented by the resident. Recommendations: Follow up for HTN and DM  DM- foot exam performed. A1C 13.2 down from 14. Pt states he is complaint with medication regimen. Will adjust Lantus to 50 units nightly. Pt to keep log of sugars and bring in at next visit. HTN- slightly elevated. Treated with lisinopril-HCTZ     More than 25 minutes spent  in face to face encounter with the patient and more than half in counseling. Patient's questions were answered. Patient Voiced understanding to the counseling.   Return in about 1 month (around 9/11/2020) for HTN, DM.   (GC Modifier)-Dr. Rosalie Bazzi MD

## 2020-09-11 ENCOUNTER — OFFICE VISIT (OUTPATIENT)
Dept: FAMILY MEDICINE CLINIC | Age: 40
End: 2020-09-11
Payer: COMMERCIAL

## 2020-09-11 VITALS
WEIGHT: 288 LBS | DIASTOLIC BLOOD PRESSURE: 95 MMHG | SYSTOLIC BLOOD PRESSURE: 140 MMHG | HEIGHT: 66 IN | TEMPERATURE: 98.6 F | HEART RATE: 87 BPM | BODY MASS INDEX: 46.28 KG/M2

## 2020-09-11 PROCEDURE — 1036F TOBACCO NON-USER: CPT | Performed by: STUDENT IN AN ORGANIZED HEALTH CARE EDUCATION/TRAINING PROGRAM

## 2020-09-11 PROCEDURE — G8427 DOCREV CUR MEDS BY ELIG CLIN: HCPCS | Performed by: STUDENT IN AN ORGANIZED HEALTH CARE EDUCATION/TRAINING PROGRAM

## 2020-09-11 PROCEDURE — 2022F DILAT RTA XM EVC RTNOPTHY: CPT | Performed by: STUDENT IN AN ORGANIZED HEALTH CARE EDUCATION/TRAINING PROGRAM

## 2020-09-11 PROCEDURE — 99213 OFFICE O/P EST LOW 20 MIN: CPT | Performed by: STUDENT IN AN ORGANIZED HEALTH CARE EDUCATION/TRAINING PROGRAM

## 2020-09-11 PROCEDURE — 3046F HEMOGLOBIN A1C LEVEL >9.0%: CPT | Performed by: STUDENT IN AN ORGANIZED HEALTH CARE EDUCATION/TRAINING PROGRAM

## 2020-09-11 PROCEDURE — G8417 CALC BMI ABV UP PARAM F/U: HCPCS | Performed by: STUDENT IN AN ORGANIZED HEALTH CARE EDUCATION/TRAINING PROGRAM

## 2020-09-11 RX ORDER — BLOOD PRESSURE TEST KIT
1 KIT MISCELLANEOUS DAILY
Qty: 1 KIT | Refills: 0 | Status: SHIPPED | OUTPATIENT
Start: 2020-09-11 | End: 2021-08-17 | Stop reason: SDUPTHER

## 2020-09-11 RX ORDER — LISINOPRIL AND HYDROCHLOROTHIAZIDE 25; 20 MG/1; MG/1
1 TABLET ORAL DAILY
Qty: 30 TABLET | Refills: 5 | Status: SHIPPED | OUTPATIENT
Start: 2020-09-11 | End: 2021-08-17 | Stop reason: SDUPTHER

## 2020-09-11 ASSESSMENT — ENCOUNTER SYMPTOMS
CHEST TIGHTNESS: 0
SHORTNESS OF BREATH: 0
NAUSEA: 0
COUGH: 0
SORE THROAT: 0
VOMITING: 0
CONSTIPATION: 0
ABDOMINAL PAIN: 0
DIARRHEA: 0

## 2020-09-11 ASSESSMENT — PATIENT HEALTH QUESTIONNAIRE - PHQ9
SUM OF ALL RESPONSES TO PHQ9 QUESTIONS 1 & 2: 0
SUM OF ALL RESPONSES TO PHQ QUESTIONS 1-9: 0
2. FEELING DOWN, DEPRESSED OR HOPELESS: 0
1. LITTLE INTEREST OR PLEASURE IN DOING THINGS: 0
SUM OF ALL RESPONSES TO PHQ QUESTIONS 1-9: 0

## 2020-09-11 NOTE — PROGRESS NOTES
Subjective:    Colby Rodriguez is a 36 y.o. male with  has a past medical history of Diabetes mellitus ([facility] Erectile dysfunction, Hypertension, Morbid obesity ([address] and Type 2 diabetes mellitus without complication ([facility] Family History   Problem Relation Age of Onset    Diabetes Mother     High Cholesterol Mother     High Blood Pressure Mother     Heart Disease Mother        Presented tot office today for:  Chief Complaint   Patient presents with    Diabetes     f/u       HPI    Patient presents today for follow-up of type 2 diabetes. Patient states to be compliant with insulin medication and last HbA1c was 12.8. Patient states he would like to get better control of his diabetes and will follow-up every 2 weeks to a month for regular monitoring. Patient also diagnosed with essential hypertension and states be compliant with medication as prescribed however states he did not take his medication this morning due to not being at home. Patient does not have a blood pressure kit and requesting 1. Review of Systems   Constitutional: Negative for chills, fatigue, fever and unexpected weight change. HENT: Negative for congestion, mouth sores and sore throat. Eyes: Negative for visual disturbance. Respiratory: Negative for cough, chest tightness and shortness of breath. Cardiovascular: Negative for chest pain and leg swelling. Gastrointestinal: Negative for abdominal pain, constipation, diarrhea, nausea and vomiting. Genitourinary: Negative for difficulty urinating. Musculoskeletal: Negative for joint swelling. Skin: Negative for rash. Neurological: Negative for dizziness, weakness and headaches.        Objective:    BP (!) 140/95   Pulse 87   Temp 98.6 °F (37 °C) (Temporal)   Ht 5' 6\" (1.676 m)   Wt 288 lb (130.6 kg)   BMI 46.48 kg/m²    BP Readings from Last 3 Encounters:   09/11/20 (!) 140/95   08/11/20 (!) 145/94   05/30/20 130/88     Physical Exam  Vitals signs and nursing note reviewed. Constitutional:       Appearance: He is well-developed. Cardiovascular:      Rate and Rhythm: Normal rate and regular rhythm. Heart sounds: Normal heart sounds. No murmur. No friction rub. No gallop. Pulmonary:      Effort: Pulmonary effort is normal. No respiratory distress. Breath sounds: Normal breath sounds. No wheezing or rales. Chest:      Chest wall: No tenderness. Abdominal:      General: Bowel sounds are normal. There is no distension. Palpations: Abdomen is soft. There is no mass. Tenderness: There is no abdominal tenderness. There is no guarding. Skin:     Capillary Refill: Capillary refill takes less than 2 seconds. Neurological:      Mental Status: He is alert and oriented to person, place, and time. Lab Results   Component Value Date    WBC 9.0 04/07/2019    HGB 16.8 04/07/2019    HCT 49.2 04/07/2019     04/07/2019    CHOL 223 (H) 12/05/2019    TRIG 158 (H) 12/05/2019    HDL 61 12/05/2019    ALT 98 (H) 04/07/2019    AST 71 (H) 04/07/2019     (L) 04/07/2019    K 3.9 04/07/2019    CL 97 (L) 04/07/2019    CREATININE 0.85 04/07/2019    BUN 7 04/07/2019    CO2 21 04/07/2019    TSH 1.04 02/21/2013    LABA1C 12.8 08/11/2020    LABMICR 61 (H) 12/05/2019     Lab Results   Component Value Date    CALCIUM 9.7 04/07/2019     Lab Results   Component Value Date    LDLCHOLESTEROL 130 12/05/2019       Assessment and Plan:    1. Essential hypertension  - lisinopril-hydroCHLOROthiazide (PRINZIDE;ZESTORETIC) 20-25 MG per tablet; Take 1 tablet by mouth daily  Dispense: 30 tablet; Refill: 5    - Blood Pressure KIT; 1 actuation by Does not apply route daily  Dispense: 1 kit; Refill: 0    2.  Type 2 diabetes mellitus without complication, with long-term current use of insulin ([address]  -Continue with insulin 50 units nightly  - [facility]  -Education provided on diet and exercise  -Patient given papers for blood glucose monitoring and to be checked at next visit  -We will consider adding pre-meal if HbA1c and blood glucose readings not at goal          Requested Prescriptions     Signed Prescriptions Disp Refills    lisinopril-hydroCHLOROthiazide (PRINZIDE;ZESTORETIC) 20-25 MG per tablet 30 tablet 5     Sig: Take 1 tablet by mouth daily    Blood Pressure KIT 1 kit 0     Si actuation by Does not apply route daily       Medications Discontinued During This Encounter   Medication Reason    lisinopril-hydroCHLOROthiazide (PRINZIDE;ZESTORETIC) 20-25 MG per tablet REORDER       Return in about 2 weeks (around 2020). Ryder received counseling on the following healthy behaviors: nutrition and exercise  Reviewed prior labs and health maintenance  Continue current medications, diet and exercise. Discussed use, benefit, and side effects of prescribed medications. Barriers to medication compliance addressed. Patient given educational materials - see patient instructions  Was a self-tracking handout given in paper form or via [employer]? Yes    Requested Prescriptions     Signed Prescriptions Disp Refills    lisinopril-hydroCHLOROthiazide (PRINZIDE;ZESTORETIC) 20-25 MG per tablet 30 tablet 5     Sig: Take 1 tablet by mouth daily    Blood Pressure KIT 1 kit 0     Si actuation by Does not apply route daily       All patient questions answered. Patient voiced understanding. Quality Measures    Body mass index is 46.48 kg/m². Elevated. Weight control planned discussed Healthy diet and regular exercise. BP: (!) 140/95 Blood pressure is high. Treatment plan consists of No treatment change needed.     Lab Results   Component Value Date    LDLCHOLESTEROL 130 2019    (goal LDL reduction with dx if diabetes is 50% LDL reduction)      PHQ Scores 2020 3/16/2020 6/10/2019 10/19/2018 2017   PHQ2 Score 0 0 0 0 0   PHQ9 Score 0 0 0 0 0     Interpretation of Total Score Depression Severity: 1-4 = Minimal depression, 5-9 = Mild depression, 10-14 = Moderate depression, 15-19 = Moderately severe depression, 20-27 = Severe depression

## 2020-09-11 NOTE — PROGRESS NOTES
Visit Information    Have you changed or started any medications since your last visit including any over-the-counter medicines, vitamins, or herbal medicines? no   Have you stopped taking any of your medications? Is so, why? -  no  Are you having any side effects from any of your medications? - no    Have you seen any other physician or provider since your last visit?  no   Have you had any other diagnostic tests since your last visit?  no   Have you been seen in the emergency room and/or had an admission in a hospital since we last saw you?  no   Have you had your routine dental cleaning in the past 6 months?  no     Do you have an active MyChart account? If no, what is the barrier?   Yes    Patient Care Team:  Kyree Ledesma MD as PCP - General (Family Medicine)  Lenny Schmitz DO as Consulting Physician (General Surgery)    Medical History Review  Past Medical, Family, and Social History reviewed and does not contribute to the patient presenting condition    Health Maintenance   Topic Date Due    Varicella vaccine (1 of 2 - 2-dose childhood series) 03/08/1981    Hepatitis B vaccine (1 of 3 - Risk 3-dose series) 03/08/1999    Annual Wellness Visit (AWV)  06/19/2019    Potassium monitoring  04/07/2020    Creatinine monitoring  04/07/2020    Flu vaccine (1) 09/01/2020    Diabetic retinal exam  08/21/2021 (Originally 5/26/2017)    A1C test (Diabetic or Prediabetic)  11/11/2020    Diabetic microalbuminuria test  12/05/2020    Lipid screen  12/05/2020    Diabetic foot exam  08/11/2021    DTaP/Tdap/Td vaccine (2 - Td) 08/15/2027    Pneumococcal 0-64 years Vaccine  Completed    HIV screen  Completed    Hepatitis A vaccine  Aged Out    Hib vaccine  Aged Out    Meningococcal (ACWY) vaccine  Aged Out

## 2020-10-02 ENCOUNTER — OFFICE VISIT (OUTPATIENT)
Dept: FAMILY MEDICINE CLINIC | Age: 40
End: 2020-10-02
Payer: COMMERCIAL

## 2020-10-02 VITALS
WEIGHT: 300 LBS | SYSTOLIC BLOOD PRESSURE: 157 MMHG | HEART RATE: 85 BPM | BODY MASS INDEX: 48.42 KG/M2 | TEMPERATURE: 96.6 F | DIASTOLIC BLOOD PRESSURE: 96 MMHG

## 2020-10-02 LAB — HBA1C MFR BLD: 10.9 %

## 2020-10-02 PROCEDURE — 99213 OFFICE O/P EST LOW 20 MIN: CPT

## 2020-10-02 PROCEDURE — G8484 FLU IMMUNIZE NO ADMIN: HCPCS

## 2020-10-02 PROCEDURE — 2022F DILAT RTA XM EVC RTNOPTHY: CPT

## 2020-10-02 PROCEDURE — 99211 OFF/OP EST MAY X REQ PHY/QHP: CPT | Performed by: FAMILY MEDICINE

## 2020-10-02 PROCEDURE — 1036F TOBACCO NON-USER: CPT

## 2020-10-02 PROCEDURE — G8417 CALC BMI ABV UP PARAM F/U: HCPCS

## 2020-10-02 PROCEDURE — 3046F HEMOGLOBIN A1C LEVEL >9.0%: CPT

## 2020-10-02 PROCEDURE — 83036 HEMOGLOBIN GLYCOSYLATED A1C: CPT

## 2020-10-02 PROCEDURE — G8427 DOCREV CUR MEDS BY ELIG CLIN: HCPCS

## 2020-10-02 RX ORDER — INSULIN GLARGINE 100 [IU]/ML
10 INJECTION, SOLUTION SUBCUTANEOUS NIGHTLY
Qty: 5 PEN | Refills: 0 | Status: SHIPPED | OUTPATIENT
Start: 2020-10-02 | End: 2020-10-07

## 2020-10-02 RX ORDER — PEN NEEDLE, DIABETIC 31 GX5/16"
NEEDLE, DISPOSABLE MISCELLANEOUS
Qty: 100 EACH | Refills: 3 | Status: SHIPPED | OUTPATIENT
Start: 2020-10-02 | End: 2022-06-21 | Stop reason: SDUPTHER

## 2020-10-02 ASSESSMENT — ENCOUNTER SYMPTOMS
COUGH: 0
DIARRHEA: 0
VOMITING: 0
WHEEZING: 0
NAUSEA: 0
COLOR CHANGE: 0
SHORTNESS OF BREATH: 0
APNEA: 0
ABDOMINAL DISTENTION: 0
CONSTIPATION: 0
PHOTOPHOBIA: 0

## 2020-10-02 NOTE — PROGRESS NOTES
Visit Information    Have you changed or started any medications since your last visit including any over-the-counter medicines, vitamins, or herbal medicines? no   Have you stopped taking any of your medications? Is so, why? -  no  Are you having any side effects from any of your medications? - no    Have you seen any other physician or provider since your last visit?  no   Have you had any other diagnostic tests since your last visit?  no   Have you been seen in the emergency room and/or had an admission in a hospital since we last saw you?  no   Have you had your routine dental cleaning in the past 6 months?  no     Do you have an active MyChart account? If no, what is the barrier?   Yes    Patient Care Team:  Pavel Anthony MD as PCP - General (Family Medicine)  Mammie Manual, DO as Consulting Physician (General Surgery)    Medical History Review  Past Medical, Family, and Social History reviewed and does not contribute to the patient presenting condition    Health Maintenance   Topic Date Due    Varicella vaccine (1 of 2 - 2-dose childhood series) 03/08/1981    Hepatitis B vaccine (1 of 3 - Risk 3-dose series) 03/08/1999    Annual Wellness Visit (AWV)  06/19/2019    Potassium monitoring  04/07/2020    Creatinine monitoring  04/07/2020    Flu vaccine (1) 09/01/2020    Diabetic retinal exam  08/21/2021 (Originally 5/26/2017)    A1C test (Diabetic or Prediabetic)  11/11/2020    Diabetic microalbuminuria test  12/05/2020    Lipid screen  12/05/2020    Diabetic foot exam  08/11/2021    DTaP/Tdap/Td vaccine (2 - Td) 08/15/2027    Pneumococcal 0-64 years Vaccine  Completed    HIV screen  Completed    Hepatitis A vaccine  Aged Out    Hib vaccine  Aged Out    Meningococcal (ACWY) vaccine  Aged Out

## 2020-10-02 NOTE — PROGRESS NOTES
I have reviewed and discussed key elements of [address] [facility] [address] with the resident including plan of care and follow up and agree with the care mitch plan.

## 2020-10-02 NOTE — PROGRESS NOTES
Subjective:    Gadiel Chaparro is a 36 y.o. male with  has a past medical history of Diabetes mellitus ([facility] Erectile dysfunction, Hypertension, Morbid obesity ([address] and Type 2 diabetes mellitus without complication ([facility] Presented to the office today for:  Chief Complaint   Patient presents with    Hypertension     here for follow up, no issues. Just took bp medication before coming in for appointment   [address]       HPI   24-year-old male came to the clinic to follow-up because of his diabetes type 2. Patient's hemoglobin A1c today is 10.9 decreased from 12 patient has been compliant with his Lantus 50 units nightly I will also add 10 units of morning Lantus patient has been doing well with his diet and exercise and has been keeping a daily log of his morning and before bedtime glucose. Patient's blood pressure was slightly high and he attributes it that he took his blood pressure pill late today in the morning otherwise he has been doing well. I will see him back in 3 months hopefully his hemoglobin C will be less than 9. No other questions or concerns at this time. Review of Systems   Constitutional: Negative for activity change, appetite change, fatigue, fever and unexpected weight change. HENT: Negative for congestion. Eyes: Negative for photophobia and visual disturbance. Respiratory: Negative for apnea, cough, shortness of breath and wheezing. Cardiovascular: Negative for chest pain, palpitations and leg swelling. Gastrointestinal: Negative for abdominal distention, constipation, diarrhea, nausea and vomiting. Endocrine: Negative for polyuria. Genitourinary: Negative for dysuria and urgency. Skin: Negative for color change, pallor, rash and wound. Neurological: Negative for dizziness, tremors, weakness, light-headedness and headaches. Psychiatric/Behavioral: Negative for agitation, behavioral problems, confusion and decreased concentration. The patient has a   Family History   Problem Relation Age of Onset    Diabetes Mother     High Cholesterol Mother     High Blood Pressure Mother     Heart Disease Mother        Objective:    BP (!) 157/96   Pulse 85   Temp 96.6 °F (35.9 °C)   Wt 300 lb (136.1 kg)   BMI 48.42 kg/m²    BP Readings from Last 3 Encounters:   10/02/20 (!) 157/96   09/11/20 (!) 140/95   08/11/20 (!) 145/94       Physical Exam  Constitutional:       Appearance: He is well-developed. HENT:      Head: Normocephalic and atraumatic. Eyes:      General: No scleral icterus. Right eye: No discharge. Left eye: No discharge. Neck:      Musculoskeletal: Normal range of motion. Cardiovascular:      Heart sounds: No murmur. No friction rub. No gallop. Pulmonary:      Effort: Pulmonary effort is normal. No respiratory distress. Breath sounds: Normal breath sounds. No wheezing. Chest:      Chest wall: No tenderness. Abdominal:      General: Bowel sounds are normal. There is no distension. Palpations: Abdomen is soft. Tenderness: There is no abdominal tenderness. There is no guarding. Musculoskeletal: Normal range of motion. General: No tenderness or deformity. Skin:     General: Skin is warm. Coloration: Skin is not pale. Findings: No erythema or rash. Neurological:      Mental Status: He is alert and oriented to person, place, and time. Sensory: No sensory deficit. Motor: No abnormal muscle tone. Psychiatric:         Behavior: Behavior normal.         Thought Content:  Thought content normal.         Judgment: Judgment normal.         Lab Results   Component Value Date    WBC 9.0 04/07/2019    HGB 16.8 04/07/2019    HCT 49.2 04/07/2019     04/07/2019    CHOL 223 (H) 12/05/2019    TRIG 158 (H) 12/05/2019    HDL 61 12/05/2019    ALT 98 (H) 04/07/2019    AST 71 (H) 04/07/2019     (L) 04/07/2019    K 3.9 04/07/2019    CL 97 (L) 04/07/2019 was transcribed using voice-recognition software. This may cause typographical errors occasionally. Although all effort is made to fix these errors, please do not hesitate to contact our office if there Chad Ibrahim concern with the understanding of this note.

## 2020-10-06 ENCOUNTER — TELEPHONE (OUTPATIENT)
Dept: FAMILY MEDICINE CLINIC | Age: 40
End: 2020-10-06

## 2020-10-06 NOTE — TELEPHONE ENCOUNTER
Patient already has a 50 units lantus nightly refilled, this was a  10 units prescribed to be taken in the morning. He is supposed to be taking 50 units of Lantus at night and 10 units of Lantus in the morning. He has both.  Thank you

## 2020-10-06 NOTE — TELEPHONE ENCOUNTER
Patient called office with complaint that he can not get his insulin that was ordered. Writer called pharmacy and they stated patient picked up insulin on 09/29/2020, but the order that was placed on 10/02/2020, shows a decrease in insulin. Please advise correct insulin order. Is it 50 units nightly order or the 10 unit order. Read the SIG for the 10 unit. First it says nightly, then not nightly. Please advise.

## 2020-10-07 DIAGNOSIS — E11.9 TYPE 2 DIABETES MELLITUS WITHOUT COMPLICATION, WITH LONG-TERM CURRENT USE OF INSULIN (HCC): Primary | ICD-10-CM

## 2020-10-07 DIAGNOSIS — Z79.4 TYPE 2 DIABETES MELLITUS WITHOUT COMPLICATION, WITH LONG-TERM CURRENT USE OF INSULIN (HCC): Primary | ICD-10-CM

## 2020-10-07 RX ORDER — INSULIN GLARGINE 100 [IU]/ML
INJECTION, SOLUTION SUBCUTANEOUS
Qty: 5 PEN | Refills: 3 | Status: SHIPPED | OUTPATIENT
Start: 2020-10-07 | End: 2021-05-20

## 2020-10-07 NOTE — TELEPHONE ENCOUNTER
I spoke with the pharmacy, and the issue is the insurance. They will not pay for the additional 10 units daily unless a whole new script is sent. The script must read: Inject 10 units in the AM and 50 units in the PM. That will be a total of 60 units daily and the insurance will then pay for it. Please submit the new script to the pharmacy. Any questions, speak with Cindy.

## 2020-11-03 NOTE — TELEPHONE ENCOUNTER
Last visit: 10-2-2020  Last Med refill: 6-1-2020  Does patient have enough medication for 72 hours: No:      Asking for januvia refill    Next Visit Date:  No future appointments. Health Maintenance   Topic Date Due    Varicella vaccine (1 of 2 - 2-dose childhood series) [birth year]08/1981    Hepatitis B vaccine (1 of 3 - Risk 3-dose series) 03/08/1999    Annual Wellness Visit (AWV)  06/19/2019    Potassium monitoring  04/07/2020    Creatinine monitoring  04/07/2020    Flu vaccine (1) 09/01/2020    Diabetic retinal exam  08/21/2021 (Originally 5/26/2017)    Diabetic microalbuminuria test  12/05/2020    Lipid screen  12/05/2020    A1C test (Diabetic or Prediabetic)  01/02/2021    Diabetic foot exam  08/11/2021    DTaP/Tdap/Td vaccine (2 - Td) 08/15/2027    Pneumococcal 0-64 years Vaccine  Completed    HIV screen  Completed    Hepatitis A vaccine  Aged Out    Hib vaccine  Aged Out    Meningococcal (ACWY) vaccine  Aged Out       Hemoglobin A1C (%)   Date Value   10/02/2020 10.9   08/11/2020 12.8   03/16/2020 13.1             ( goal A1C is < 7)   Microalb/Crt.  Ratio (mcg/mg creat)   Date Value   12/05/2019 61 (H)     LDL Cholesterol (mg/dL)   Date Value   12/05/2019 130   07/20/2018 116       (goal LDL is <100)   AST (U/L)   Date Value   04/07/2019 71 (H)     ALT (U/L)   Date Value   04/07/2019 98 (H)     BUN (mg/dL)   Date Value   04/07/2019 7     BP Readings from Last 3 Encounters:   10/02/20 (!) 157/96   09/11/20 (!) 140/95   08/11/20 (!) 145/94          (goal 120/80)    All Future Testing planned in [facility]  Lab Frequency Next Occurrence   Basic Metabolic Panel Once 51/56/1564               Patient Active Problem List:     Type 2 diabetes mellitus without complication, with long-term current use of insulin (HCC)     Essential hypertension     Morbid obesity ([address]     Scalp lesion     Erectile dysfunction     Morbid obesity with BMI of 45.0-49.9, adult ([address]

## 2020-11-16 ENCOUNTER — HOSPITAL ENCOUNTER (EMERGENCY)
Age: 40
Discharge: HOME | End: 2020-11-16
Attending: EMERGENCY MEDICINE
Payer: COMMERCIAL

## 2020-11-16 VITALS
RESPIRATION RATE: 18 BRPM | OXYGEN SATURATION: 96 % | HEIGHT: 67 IN | TEMPERATURE: 98.4 F | HEART RATE: 102 BPM | WEIGHT: 290 LBS | SYSTOLIC BLOOD PRESSURE: 157 MMHG | BODY MASS INDEX: 45.52 KG/M2 | DIASTOLIC BLOOD PRESSURE: 97 MMHG

## 2020-11-16 LAB
CHP ED QC CHECK: NORMAL
GLUCOSE BLD-MCNC: 255 MG/DL
GLUCOSE BLD-MCNC: 255 MG/DL (ref 75–110)

## 2020-11-16 PROCEDURE — 87205 SMEAR GRAM STAIN: CPT

## 2020-11-16 PROCEDURE — 86403 PARTICLE AGGLUT ANTBDY SCRN: CPT

## 2020-11-16 PROCEDURE — 87591 N.GONORRHOEAE DNA AMP PROB: CPT

## 2020-11-16 PROCEDURE — 82947 ASSAY GLUCOSE BLOOD QUANT: CPT

## 2020-11-16 PROCEDURE — 99283 EMERGENCY DEPT VISIT LOW MDM: CPT

## 2020-11-16 PROCEDURE — 87070 CULTURE OTHR SPECIMN AEROBIC: CPT

## 2020-11-16 PROCEDURE — 87491 CHLMYD TRACH DNA AMP PROBE: CPT

## 2020-11-16 RX ORDER — FLUCONAZOLE 100 MG/1
100 TABLET ORAL DAILY
Qty: 7 TABLET | Refills: 0 | Status: SHIPPED | OUTPATIENT
Start: 2020-11-16 | End: 2020-11-23

## 2020-11-16 RX ORDER — NYSTATIN 100000 [USP'U]/G
POWDER TOPICAL
Qty: 15 G | Refills: 0 | Status: SHIPPED | OUTPATIENT
Start: 2020-11-16 | End: 2022-03-29

## 2020-11-16 ASSESSMENT — PAIN DESCRIPTION - DESCRIPTORS: DESCRIPTORS: ITCHING

## 2020-11-16 ASSESSMENT — ENCOUNTER SYMPTOMS
NAUSEA: 0
DIARRHEA: 0
COLOR CHANGE: 1
ABDOMINAL PAIN: 0
VOMITING: 0

## 2020-11-16 ASSESSMENT — PAIN DESCRIPTION - LOCATION: LOCATION: SCROTUM

## 2020-11-16 ASSESSMENT — PAIN SCALES - GENERAL: PAINLEVEL_OUTOF10: 6

## 2020-11-17 LAB
C. TRACHOMATIS DNA ,URINE: NEGATIVE
N. GONORRHOEAE DNA, URINE: NEGATIVE
SPECIMEN DESCRIPTION: NORMAL

## 2020-11-17 NOTE — ED PROVIDER NOTES
[facility] ED  eMERGENCY dEPARTMENT eNCOUnter      Pt Name: Xiang De Jesus  MRN: [id number]  [place] 1980  Date of evaluation: 11/16/2020  Provider: RENEE Arrington CNP    CHIEF COMPLAINT       Chief Complaint   Patient presents with    Pruritis     pt states that his scrotuma nd his glnas penis has been itching for the last week          HISTORY OF PRESENT ILLNESS  (Location/Symptom, Timing/Onset, Context/Setting, Quality, Duration, Modifying Factors, Severity.)   Xiang De Jesus is a 36 y.o. male who presents to the emergency department via private auto for a rash with itching to the shaft and glans of his penis. Onset was about a week ago. Denies injury, fever, chills, penile discharge. He is diabetic. His blood sugars run around 200. Rates his pain 6/10 at this time. Nursing Notes were reviewed. ALLERGIES     Patient has no known allergies. CURRENT MEDICATIONS       Discharge Medication List as of 11/16/2020  8:23 PM      CONTINUE these medications which have NOT CHANGED    Details   SITagliptin (JANUVIA) 100 MG tablet take 1 tablet by mouth once daily, Disp-90 tablet,R-0Normal      insulin glargine (LANTUS SOLOSTAR) 100 UNIT/ML injection pen Take 50 units nightly and 10 units in the morning daily, Disp-5 pen,R-3Normal      !!  Insulin Pen Needle (B-D UF III MINI PEN NEEDLES) 31G X 5 MM MISC Disp-100 each,R-3, NormalUSE ONCE DAILY      lisinopril-hydroCHLOROthiazide (PRINZIDE;ZESTORETIC) 20-25 MG per tablet Take 1 tablet by mouth daily, Disp-30 tablet,R-5Normal      Blood Pressure KIT DAILY Starting Fri 9/11/2020, Disp-1 kit,R-0, Print      Colloidal Oatmeal (AVEENO ECZEMA THERAPY) 1 % CREA Apply 1 Tube topically as needed (use as needed), Disp-1 Tube,R-2Normal      ibuprofen (ADVIL;MOTRIN) 800 MG tablet Take 1 tablet by mouth every 8 hours as needed for Pain, Disp-20 tablet, R-0Print      metFORMIN (GLUMETZA) 1000 MG extended release tablet Take 1 tablet by mouth daily (with breakfast), Disp-90 tablet, R-1Normal      naproxen (NAPROSYN) 500 MG tablet Take 1 tablet by mouth 2 times daily, Disp-20 tablet, R-0Print      !! B-D UF III MINI PEN NEEDLES 31G X 5 MM MISC R-0, KINZA, Historical Med      Glucose Blood (BLOOD GLUCOSE TEST STRIPS) STRP 1 each by In Vitro route 3 times daily, Disp-30 strip, R-3Normal      SOFT TOUCH LANCETS MISC 3 TIMES DAILY Starting 2018, Disp-100 each, R-3, Normal      glucose monitoring kit (FREESTYLE) monitoring kit Disp-1 kit, R-0, NormalCheck blood sugar three times daily      Alcohol Swabs (ALCOHOL PREP) 70 % PADS 3 TIMES DAILY Starting Mon 1/15/2018, Disp-100 each, R-1, Normal       !! - Potential duplicate medications found. Please discuss with provider. PAST MEDICAL HISTORY         Diagnosis Date    Diabetes mellitus [facility]     Erectile dysfunction 8/15/2017    Hypertension     Morbid obesity ([facility] 2016    Type 2 diabetes mellitus without complication ([facility] 2498       SURGICAL HISTORY           Procedure Laterality Date    PRE-MALIGNANT / BENIGN SKIN LESION EXCISION Right 2017    rt scalp lesion removed    TONSILLECTOMY           FAMILY HISTORY           Problem Relation Age of Onset    Diabetes Mother     High Cholesterol Mother     High Blood Pressure Mother     Heart Disease Mother      Family Status   Relation Name Status    Mother  Alive    Father          SOCIAL HISTORY      reports that he has never smoked. He has never used smokeless tobacco. He reports that he does not drink alcohol or use drugs. REVIEW OF SYSTEMS    (2-9 systems for level 4, 10 or more for level 5)     Review of Systems   Constitutional: Negative for chills, diaphoresis, fatigue and fever. Gastrointestinal: Negative for abdominal pain, diarrhea, nausea and vomiting.    Genitourinary: Negative for discharge, dysuria, flank pain, frequency, hematuria, penile pain, penile swelling, scrotal swelling, testicular pain and urgency. Skin: Positive for color change and rash. Negative for wound. Neurological: Negative for weakness. Except as noted above the remainder of the review of systems was reviewed and negative. PHYSICAL EXAM    (up to 7 for level 4, 8 or more for level 5)     ED Triage Vitals [11/16/20 1850]   BP Temp Temp Source Pulse Resp SpO2 Height Weight   (!) 157/97 98.4 °F (36.9 °C) Oral 102 18 96 % 5' 7\" (1.702 m) 290 lb (131.5 kg)     Physical Exam  Vitals signs reviewed. Exam conducted with a chaperone present. Constitutional:       General: He is not in acute distress. Appearance: He is well-developed. He is not diaphoretic. Eyes:      General: No scleral icterus. Conjunctiva/sclera: Conjunctivae normal.   Cardiovascular:      Rate and Rhythm: Normal rate. Pulmonary:      Effort: Pulmonary effort is normal. No respiratory distress. Genitourinary:     Penis: Circumcised. Erythema present. No swelling or lesions. Comments: Moist, white, thick drainage/rash noted to distal shaft of penis and glans penis. No drainage noted from urethra. Skin:     General: Skin is warm and dry. Findings: No rash. Neurological:      Mental Status: He is alert and oriented to person, place, and time. Psychiatric:         Behavior: Behavior normal.         DIAGNOSTIC RESULTS     LABS:  Labs Reviewed   CULTURE, WOUND - Abnormal; Notable for the following components:       Result Value    Direct Exam   (*)     Value: This specimen contains greater than 9 Squamous Epithelial cells per LPF which is an indicator of a low quality specimen.     Direct Exam MODERATE GRAM POSITIVE COCCI (*)     Direct Exam FEW GRAM NEGATIVE RODS (*)     Culture   (*)     Value: STREPTOCOCCI, BETA HEMOLYTIC GROUP B MODERATE GROWTH    All other components within normal limits   POC GLUCOSE FINGERSTICK - Abnormal; Notable for the following components:    POC Glucose 255 (*)     All other components within normal limits   POCT GLUCOSE - Normal   C.TRACHOMATIS N.GONORRHOEAE DNA, URINE       All other labs were within normal range or not returned as of this dictation. EMERGENCY DEPARTMENT COURSE and DIFFERENTIAL DIAGNOSIS/MDM:   Vitals:    Vitals:    11/16/20 1850   BP: (!) 157/97   Pulse: 102   Resp: 18   Temp: 98.4 °F (36.9 °C)   TempSrc: Oral   SpO2: 96%   Weight: 290 lb (131.5 kg)   Height: 5' 7\" (1.702 m)       CLINICAL DECISION MAKING:  The patient presented alert with a nontoxic appearance and was seen in conjunction with Dr. Felicia Choi. Culture of the drainage is pending. His blood sugar was >200. Prescriptions were provided. Follow up with pcp, return to ED if condition worsens. FINAL IMPRESSION      1. Fungal infection            Problem List  Patient Active Problem List   Diagnosis Code    Type 2 diabetes mellitus without complication, with long-term current use of insulin ([facility] E11.9, Z79.4    Essential hypertension I10    Morbid obesity ([address] E66.01    Scalp lesion L98.9    Erectile dysfunction N52.9    Morbid obesity with BMI of 45.0-49.9, adult ([address] E66.01, Z68.42         DISPOSITION/PLAN   DISPOSITION  DISCHARGE      PATIENT REFERRED TO:   Marcelle Elias MD  [address] [place] [id number]    Schedule an appointment as soon as possible for a visit       [facility] ED  [address] [facility]  [phone]          DISCHARGE MEDICATIONS:     Discharge Medication List as of 11/16/2020  8:23 PM      START taking these medications    Details   fluconazole (DIFLUCAN) 100 MG tablet Take 1 tablet by mouth daily for 7 days, Disp-7 tablet,R-0Print      nystatin (MYCOSTATIN) 542354 UNIT/GM powder Apply topically 4 times daily. , Disp-15 g,R-0, Print                 (Please note that portions of this note were completed with a voice recognition program.  Efforts were made to edit the dictations but occasionally words are mis-transcribed.)    Tico Yanez, APRN - CNP     Angeline Nieves Lacy Montoya - CNP  11/17/20 2107

## 2020-11-18 LAB
CULTURE: ABNORMAL
CULTURE: ABNORMAL
DIRECT EXAM: ABNORMAL
Lab: ABNORMAL
SPECIMEN DESCRIPTION: ABNORMAL

## 2020-11-19 NOTE — PROGRESS NOTES
Culture Callback Review 11/16/2020:   No additional recommendations made at this time. Low quality specimen from wound culture evidenced by the epithelial cells in the culture. If patient represents with same wound, will need to get new specimen. No recommended changes at this time, patient diagnosed with a fungal infection based on exam.    Chacho Gil, PharmD  Emergency Department and Critical Care Pharmacist

## 2021-03-10 ENCOUNTER — TELEPHONE (OUTPATIENT)
Dept: FAMILY MEDICINE CLINIC | Age: 41
End: 2021-03-10

## 2021-03-10 NOTE — TELEPHONE ENCOUNTER
Placed tfc to patient in attempts to reschedule missed appointment on 03/08/21, but to no avail left voicemail message #[phone].

## 2021-03-11 NOTE — TELEPHONE ENCOUNTER
Placed tfc to patient in attempts to reschedule missed appointment on 03/08/21, but voicemail box is full unable to leave message # [phone]. Letter sent. Carlos Grover

## 2021-03-24 DIAGNOSIS — E11.9 TYPE 2 DIABETES MELLITUS WITHOUT COMPLICATION, WITH LONG-TERM CURRENT USE OF INSULIN (HCC): ICD-10-CM

## 2021-03-24 DIAGNOSIS — Z79.4 TYPE 2 DIABETES MELLITUS WITHOUT COMPLICATION, WITH LONG-TERM CURRENT USE OF INSULIN (HCC): ICD-10-CM

## 2021-03-24 NOTE — TELEPHONE ENCOUNTER
Please address the medication refill and close the encounter. If I can be of assistance, please route to the applicable pool. Thank you. Last visit: 10/02/2020  Last Med refill: 11/3/2020  Does patient have enough medication for 72 hours: No:     Next Visit Date:  No future appointments. Health Maintenance   Topic Date Due    Hepatitis C screen  Never done    Varicella vaccine (1 of 2 - 2-dose childhood series) Never done    COVID-19 Vaccine (1) Never done    Hepatitis B vaccine (1 of 3 - Risk 3-dose series) Never done    Annual Wellness Visit (AWV)  Never done    Potassium monitoring  04/07/2020    Creatinine monitoring  04/07/2020    Flu vaccine (1) 09/01/2020    Diabetic microalbuminuria test  12/05/2020    Lipid screen  12/05/2020    A1C test (Diabetic or Prediabetic)  01/02/2021    Diabetic retinal exam  08/21/2021 (Originally 5/26/2017)    Diabetic foot exam  08/11/2021    DTaP/Tdap/Td vaccine (2 - Td) 08/15/2027    Pneumococcal 0-64 years Vaccine  Completed    HIV screen  Completed    Hepatitis A vaccine  Aged Out    Hib vaccine  Aged Out    Meningococcal (ACWY) vaccine  Aged Out       Hemoglobin A1C (%)   Date Value   10/02/2020 10.9   08/11/2020 12.8   03/16/2020 13.1             ( goal A1C is < 7)   Microalb/Crt.  Ratio (mcg/mg creat)   Date Value   12/05/2019 61 (H)     LDL Cholesterol (mg/dL)   Date Value   12/05/2019 130   07/20/2018 116       (goal LDL is <100)   AST (U/L)   Date Value   04/07/2019 71 (H)     ALT (U/L)   Date Value   04/07/2019 98 (H)     BUN (mg/dL)   Date Value   04/07/2019 7     BP Readings from Last 3 Encounters:   11/16/20 (!) 157/97   10/02/20 (!) 157/96   09/11/20 (!) 140/95          (goal 120/80)    All Future Testing planned in [facility]  Lab Frequency Next Occurrence   Basic Metabolic Panel Once 45/36/7811               Patient Active Problem List:     Type 2 diabetes mellitus without complication, with long-term current use of insulin ([address]

## 2021-05-05 ENCOUNTER — APPOINTMENT (OUTPATIENT)
Dept: GENERAL RADIOLOGY | Age: 41
End: 2021-05-05
Payer: COMMERCIAL

## 2021-05-05 ENCOUNTER — HOSPITAL ENCOUNTER (EMERGENCY)
Age: 41
Discharge: HOME | End: 2021-05-05
Attending: EMERGENCY MEDICINE
Payer: COMMERCIAL

## 2021-05-05 VITALS
HEIGHT: 67 IN | BODY MASS INDEX: 45.36 KG/M2 | SYSTOLIC BLOOD PRESSURE: 137 MMHG | DIASTOLIC BLOOD PRESSURE: 84 MMHG | OXYGEN SATURATION: 98 % | TEMPERATURE: 98.2 F | HEART RATE: 85 BPM | WEIGHT: 289 LBS | RESPIRATION RATE: 16 BRPM

## 2021-05-05 DIAGNOSIS — M79.604 RIGHT LEG PAIN: Primary | ICD-10-CM

## 2021-05-05 PROCEDURE — 99282 EMERGENCY DEPT VISIT SF MDM: CPT

## 2021-05-05 PROCEDURE — 73502 X-RAY EXAM HIP UNI 2-3 VIEWS: CPT

## 2021-05-05 RX ORDER — TIZANIDINE 2 MG/1
2 TABLET ORAL EVERY 8 HOURS PRN
Qty: 15 TABLET | Refills: 0 | Status: SHIPPED | OUTPATIENT
Start: 2021-05-05 | End: 2022-03-29

## 2021-05-05 RX ORDER — HYDROCODONE BITARTRATE AND ACETAMINOPHEN 5; 325 MG/1; MG/1
1 TABLET ORAL EVERY 8 HOURS PRN
Qty: 9 TABLET | Refills: 0 | Status: SHIPPED | OUTPATIENT
Start: 2021-05-05 | End: 2021-05-08

## 2021-05-05 ASSESSMENT — PAIN DESCRIPTION - ORIENTATION: ORIENTATION: RIGHT;OUTER

## 2021-05-05 ASSESSMENT — ENCOUNTER SYMPTOMS
SHORTNESS OF BREATH: 0
BACK PAIN: 0
COLOR CHANGE: 0

## 2021-05-05 ASSESSMENT — PAIN DESCRIPTION - PAIN TYPE: TYPE: ACUTE PAIN

## 2021-05-05 ASSESSMENT — PAIN DESCRIPTION - LOCATION: LOCATION: LEG

## 2021-05-05 NOTE — ED PROVIDER NOTES
daily (with breakfast)    NAPROXEN (NAPROSYN) 500 MG TABLET    Take 1 tablet by mouth 2 times daily    NYSTATIN (MYCOSTATIN) 426257 UNIT/GM POWDER    Apply topically 4 times daily. SITAGLIPTIN (JANUVIA) 100 MG TABLET    take 1 tablet by mouth once daily    SOFT TOUCH LANCETS MISC    1 each by Does not apply route 3 times daily       PAST MEDICAL HISTORY         Diagnosis Date    Diabetes mellitus ([facility]     Erectile dysfunction 8/15/2017    Hypertension     Morbid obesity ([facility] 2016    Type 2 diabetes mellitus without complication ([facility]        SURGICAL HISTORY           Procedure Laterality Date    PRE-MALIGNANT / BENIGN SKIN LESION EXCISION Right 2017    rt scalp lesion removed    TONSILLECTOMY           FAMILY HISTORY           Problem Relation Age of Onset    Diabetes Mother     High Cholesterol Mother     High Blood Pressure Mother     Heart Disease Mother      Family Status   Relation Name Status    Mother  Alive    Father          SOCIAL HISTORY      reports that he has never smoked. He has never used smokeless tobacco. He reports that he does not drink alcohol or use drugs. REVIEW OF SYSTEMS    (2-9 systems for level 4, 10 or more for level 5)     Review of Systems   Constitutional: Negative for chills, diaphoresis, fatigue and fever. Respiratory: Negative for shortness of breath. Cardiovascular: Negative for chest pain. Musculoskeletal: Positive for arthralgias and myalgias. Negative for back pain and neck pain. Skin: Negative for color change, rash and wound. Neurological: Negative for dizziness, weakness and numbness. Except as noted above the remainder of the review of systems was reviewed and negative.      PHYSICAL EXAM    (up to 7 for level 4, 8 or more for level 5)     ED Triage Vitals [21 1120]   BP Temp Temp Source Pulse Resp SpO2 Height Weight   137/84 98.2 °F (36.8 °C) Oral 85 16 98 % 5' 7\" (1.702 m) 289 lb (131.1 kg)     Physical Exam  Vitals signs reviewed. Constitutional:       General: He is not in acute distress. Appearance: He is well-developed. He is not diaphoretic. Eyes:      General: No scleral icterus. Conjunctiva/sclera: Conjunctivae normal.   Cardiovascular:      Rate and Rhythm: Normal rate. Pulses: Normal pulses. Pulmonary:      Effort: Pulmonary effort is normal. No respiratory distress. Breath sounds: No stridor. Musculoskeletal:      Right hip: He exhibits tenderness. He exhibits no swelling and no deformity. Right upper leg: He exhibits tenderness. He exhibits no bony tenderness, no swelling and no deformity. Legs:    Skin:     General: Skin is warm and dry. Findings: No rash. Neurological:      Mental Status: He is alert and oriented to person, place, and time. Psychiatric:         Behavior: Behavior normal.           DIAGNOSTIC RESULTS     RADIOLOGY:   Non-plain film images such as CT, Ultrasound and MRI are read by the radiologist. [facility] radiographic images are visualized and preliminarily interpreted by the emergency physician with the below findings:    Interpretation per the Radiologist below, if available at the time of this note:    Xr Hip 2-3 Vw W Pelvis Right    Result Date: 5/5/2021  EXAMINATION: [id number] [address] 5/5/2021 8:56 am COMPARISON: None. HISTORY: ORDERING SYSTEM PROVIDED HISTORY: pain TECHNOLOGIST PROVIDED HISTORY: pain Acuity: Acute Type of Exam: Initial FINDINGS: The hip demonstrates normal alignment. No evidence of acute fracture. No focal osseus lesion. Pelvis is intact. No acute abnormality of the hip.      EMERGENCY DEPARTMENT COURSE and DIFFERENTIAL DIAGNOSIS/MDM:   Vitals:    Vitals:    05/05/21 1120   BP: 137/84   Pulse: 85   Resp: 16   Temp: 98.2 °F (36.8 °C)   TempSrc: Oral   SpO2: 98%   Weight: 289 lb (131.1 kg)   Height: 5' 7\" (1.702 m)       CLINICAL DECISION MAKING:  The patient presented alert with a nontoxic appearance and was seen in conjunction with Dr. Izzy Thapa. There is no suspicion for DVT given the location of his pain. Imaging was negative for acute findings. OARRS was reviewed. Prescriptions were written for Zanaflex and norco. The patient was advised to not drink alcohol, drive, or operate heavy machinery while taking the medication. Follow up with pcp, return to ED if condition worsens. FINAL IMPRESSION      1. Right leg pain            Problem List  Patient Active Problem List   Diagnosis Code    Type 2 diabetes mellitus without complication, with long-term current use of insulin ([facility] E11.9, Z79.4    Essential hypertension I10    Morbid obesity ([facility] E66.01    Scalp lesion L98.9    Erectile dysfunction N52.9    Morbid obesity with BMI of 45.0-49.9, adult ([facility] E66.01, Z68.42         DISPOSITION/PLAN   DISPOSITION Decision To Discharge 05/05/2021 12:15:05 PM      PATIENT REFERRED TO:   Adelso Aguirre MD  [facility] [address]  [phone]    Schedule an appointment as soon as possible for a visit       [facility] ED  [address] [facility]  [phone]          DISCHARGE MEDICATIONS:     New Prescriptions    HYDROCODONE-ACETAMINOPHEN (NORCO) 5-325 MG PER TABLET    Take 1 tablet by mouth every 8 hours as needed for Pain for up to 3 days.     TIZANIDINE (ZANAFLEX) 2 MG TABLET    Take 1 tablet by mouth every 8 hours as needed (back pain)           (Please note that portions of this note were completed with a voice recognition program.  Efforts were made to edit the dictations but occasionally words are mis-transcribed.)    Luis Angel Burns, [address] [facility], APRN - CNP  05/05/21 [id number]

## 2021-05-06 ENCOUNTER — TELEPHONE (OUTPATIENT)
Dept: FAMILY MEDICINE CLINIC | Age: 41
End: 2021-05-06

## 2021-05-11 NOTE — ED PROVIDER NOTES
eMERGENCY dEPARTMENT eNCOUnter   [address] Name: Dasha Huff  MRN: [id number]  [place] 1980  Date of evaluation: 5/11/21     Dasha Huff is a 39 y.o. male with CC: Leg Pain (right, one week, no known injury)      Based on the medical record the care appears appropriate. I was personally available for consultation in the Emergency Department.     Jannet Palomares MD  Attending Emergency Physician                    Jannet Palomares MD  05/11/21 3406

## 2021-05-20 ENCOUNTER — HOSPITAL ENCOUNTER (OUTPATIENT)
Age: 41
Setting detail: SPECIMEN
Discharge: HOME | End: 2021-05-20
Payer: COMMERCIAL

## 2021-05-20 ENCOUNTER — OFFICE VISIT (OUTPATIENT)
Dept: FAMILY MEDICINE CLINIC | Age: 41
End: 2021-05-20
Payer: COMMERCIAL

## 2021-05-20 VITALS
WEIGHT: 281 LBS | DIASTOLIC BLOOD PRESSURE: 86 MMHG | HEART RATE: 97 BPM | SYSTOLIC BLOOD PRESSURE: 138 MMHG | BODY MASS INDEX: 44.01 KG/M2

## 2021-05-20 DIAGNOSIS — Z79.4 TYPE 2 DIABETES MELLITUS WITHOUT COMPLICATION, WITH LONG-TERM CURRENT USE OF INSULIN (HCC): Primary | ICD-10-CM

## 2021-05-20 DIAGNOSIS — E11.9 TYPE 2 DIABETES MELLITUS WITHOUT COMPLICATION, WITH LONG-TERM CURRENT USE OF INSULIN (HCC): ICD-10-CM

## 2021-05-20 DIAGNOSIS — E11.9 TYPE 2 DIABETES MELLITUS WITHOUT COMPLICATION, WITH LONG-TERM CURRENT USE OF INSULIN (HCC): Primary | ICD-10-CM

## 2021-05-20 DIAGNOSIS — Z79.4 TYPE 2 DIABETES MELLITUS WITHOUT COMPLICATION, WITH LONG-TERM CURRENT USE OF INSULIN (HCC): ICD-10-CM

## 2021-05-20 LAB
ANION GAP SERPL CALCULATED.3IONS-SCNC: 16 MMOL/L (ref 9–17)
BUN BLDV-MCNC: 9 MG/DL (ref 6–20)
BUN/CREAT BLD: ABNORMAL (ref 9–20)
CALCIUM SERPL-MCNC: 10.1 MG/DL (ref 8.6–10.4)
CHLORIDE BLD-SCNC: 95 MMOL/L (ref 98–107)
CHOLESTEROL/HDL RATIO: 4.2
CHOLESTEROL: 212 MG/DL
CO2: 25 MMOL/L (ref 20–31)
CREAT SERPL-MCNC: 0.83 MG/DL (ref 0.7–1.2)
CREATININE URINE: 82.5 MG/DL (ref 39–259)
GFR AFRICAN AMERICAN: >60 ML/MIN
GFR NON-AFRICAN AMERICAN: >60 ML/MIN
GFR SERPL CREATININE-BSD FRML MDRD: ABNORMAL ML/MIN/{1.73_M2}
GFR SERPL CREATININE-BSD FRML MDRD: ABNORMAL ML/MIN/{1.73_M2}
GLUCOSE BLD-MCNC: 388 MG/DL (ref 70–99)
HBA1C MFR BLD: 11.3 %
HDLC SERPL-MCNC: 50 MG/DL
LDL CHOLESTEROL: 131 MG/DL (ref 0–130)
MICROALBUMIN/CREAT 24H UR: 124 MG/L
MICROALBUMIN/CREAT UR-RTO: 150 MCG/MG CREAT
POTASSIUM SERPL-SCNC: 4 MMOL/L (ref 3.7–5.3)
SODIUM BLD-SCNC: 136 MMOL/L (ref 135–144)
TRIGL SERPL-MCNC: 154 MG/DL
VLDLC SERPL CALC-MCNC: ABNORMAL MG/DL (ref 1–30)

## 2021-05-20 PROCEDURE — 83036 HEMOGLOBIN GLYCOSYLATED A1C: CPT | Performed by: STUDENT IN AN ORGANIZED HEALTH CARE EDUCATION/TRAINING PROGRAM

## 2021-05-20 PROCEDURE — 3046F HEMOGLOBIN A1C LEVEL >9.0%: CPT | Performed by: STUDENT IN AN ORGANIZED HEALTH CARE EDUCATION/TRAINING PROGRAM

## 2021-05-20 PROCEDURE — 1036F TOBACCO NON-USER: CPT | Performed by: STUDENT IN AN ORGANIZED HEALTH CARE EDUCATION/TRAINING PROGRAM

## 2021-05-20 PROCEDURE — 99213 OFFICE O/P EST LOW 20 MIN: CPT | Performed by: STUDENT IN AN ORGANIZED HEALTH CARE EDUCATION/TRAINING PROGRAM

## 2021-05-20 PROCEDURE — G8417 CALC BMI ABV UP PARAM F/U: HCPCS | Performed by: STUDENT IN AN ORGANIZED HEALTH CARE EDUCATION/TRAINING PROGRAM

## 2021-05-20 PROCEDURE — G8427 DOCREV CUR MEDS BY ELIG CLIN: HCPCS | Performed by: STUDENT IN AN ORGANIZED HEALTH CARE EDUCATION/TRAINING PROGRAM

## 2021-05-20 PROCEDURE — 2022F DILAT RTA XM EVC RTNOPTHY: CPT | Performed by: STUDENT IN AN ORGANIZED HEALTH CARE EDUCATION/TRAINING PROGRAM

## 2021-05-20 RX ORDER — ATORVASTATIN CALCIUM 40 MG/1
40 TABLET, FILM COATED ORAL DAILY
Qty: 30 TABLET | Refills: 3 | Status: SHIPPED | OUTPATIENT
Start: 2021-05-20 | End: 2021-08-17 | Stop reason: SDUPTHER

## 2021-05-20 RX ORDER — INSULIN GLARGINE 100 [IU]/ML
INJECTION, SOLUTION SUBCUTANEOUS
Qty: 5 PEN | Refills: 3 | Status: SHIPPED | OUTPATIENT
Start: 2021-05-20 | End: 2021-08-17 | Stop reason: SDUPTHER

## 2021-05-20 RX ORDER — GABAPENTIN 100 MG/1
300 CAPSULE ORAL 3 TIMES DAILY
Qty: 270 CAPSULE | Refills: 0 | Status: SHIPPED | OUTPATIENT
Start: 2021-05-20 | End: 2021-08-17 | Stop reason: SDUPTHER

## 2021-05-20 ASSESSMENT — ENCOUNTER SYMPTOMS
SORE THROAT: 0
DIARRHEA: 0
NAUSEA: 0
CONSTIPATION: 0
COUGH: 0
CHEST TIGHTNESS: 0
SHORTNESS OF BREATH: 0
ABDOMINAL PAIN: 0
VOMITING: 0

## 2021-05-20 NOTE — PROGRESS NOTES
heard.   No friction rub. No gallop. Pulmonary:      Effort: Pulmonary effort is normal. No respiratory distress. Breath sounds: Normal breath sounds. No wheezing or rales. Chest:      Chest wall: No tenderness. Abdominal:      General: Bowel sounds are normal. There is no distension. Palpations: Abdomen is soft. There is no mass. Tenderness: There is no abdominal tenderness. There is no guarding. Skin:     Capillary Refill: Capillary refill takes less than 2 seconds. Neurological:      Mental Status: He is alert and oriented to person, place, and time. Lab Results   Component Value Date    WBC 9.0 04/07/2019    HGB 16.8 04/07/2019    HCT 49.2 04/07/2019     04/07/2019    CHOL 223 (H) 12/05/2019    TRIG 158 (H) 12/05/2019    HDL 61 12/05/2019    ALT 98 (H) 04/07/2019    AST 71 (H) 04/07/2019     (L) 04/07/2019    K 3.9 04/07/2019    CL 97 (L) 04/07/2019    CREATININE 0.85 04/07/2019    BUN 7 04/07/2019    CO2 21 04/07/2019    TSH 1.04 02/21/2013    LABA1C 11.3 05/20/2021    LABMICR 61 (H) 12/05/2019     Lab Results   Component Value Date    CALCIUM 9.7 04/07/2019     Lab Results   Component Value Date    LDLCHOLESTEROL 130 12/05/2019       Assessment and Plan:    1. Type 2 diabetes mellitus without complication, with long-term current use of insulin ([facility])  - POCT glycosylated hemoglobin (Hb A1C) at 11.3 today  - insulin glargine (LANTUS SOLOSTAR) 100 UNIT/ML injection pen; Take 55 units nightly  Dispense: 5 pen; Refill: 3  -STOPPED 10 UNITS LANTUS IN THE MORNING AND INCREASED NIGHTLY BY 5 UNITS FROM 50 TO 55 UNITS  - gabapentin (NEURONTIN) 100 MG capsule; Take 3 capsules by mouth 3 times daily for 30 days. Intended supply: 30 days  Dispense: 270 capsule;  Refill: 0  - Pt educated on proper eating   -Pt understands that diet is important will cut out all sugary substances.   -Pt provided with Glucose sheets to record fasting and 2 hr post meal. Will bring at next visit and discuss further in 2 weeks. -BMP and Micro ordered  -lipid panel ordered    Requested Prescriptions     Signed Prescriptions Disp Refills    insulin glargine (LANTUS SOLOSTAR) 100 UNIT/ML injection pen 5 pen 3     Sig: Take 55 units nightly    gabapentin (NEURONTIN) 100 MG capsule 270 capsule 0     Sig: Take 3 capsules by mouth 3 times daily for 30 days. Intended supply: 30 days       Medications Discontinued During This Encounter   Medication Reason    insulin glargine (LANTUS SOLOSTAR) 100 UNIT/ML injection pen        Return in about 6 weeks (around 7/1/2021) for leg pain. Ryder received counseling on the following healthy behaviors: nutrition and exercise  Reviewed prior labs and health maintenance  Continue current medications, diet and exercise. Discussed use, benefit, and side effects of prescribed medications. Barriers to medication compliance addressed. Patient given educational materials - see patient instructions  Was a self-tracking handout given in paper form or via [employer]? No:     Requested Prescriptions     Signed Prescriptions Disp Refills    insulin glargine (LANTUS SOLOSTAR) 100 UNIT/ML injection pen 5 pen 3     Sig: Take 55 units nightly    gabapentin (NEURONTIN) 100 MG capsule 270 capsule 0     Sig: Take 3 capsules by mouth 3 times daily for 30 days. Intended supply: 30 days       All patient questions answered. Patient voiced understanding. Quality Measures    Body mass index is 44.01 kg/m². Elevated. Weight control planned discussed Healthy diet and regular exercise. BP: (!) 141/91 Blood pressure is high. Treatment plan consists of Weight Reduction, DASH Eating Plan, Dietary Sodium Restriction and Increased Physical Activity.     Lab Results   Component Value Date    LDLCHOLESTEROL 130 12/05/2019    (goal LDL reduction with dx if diabetes is 50% LDL reduction)      PHQ Scores 9/11/2020 3/16/2020 6/10/2019 10/19/2018 11/20/2017   PHQ2 Score 0 0 0 0 0   PHQ9 Score 0 0 0 0

## 2021-05-20 NOTE — PROGRESS NOTES
Diabetic visit information    BP Readings from Last 3 Encounters:   05/05/21 137/84   11/16/20 (!) 157/97   10/02/20 (!) 157/96       Hemoglobin A1C (%)   Date Value   10/02/2020 10.9   08/11/2020 12.8   03/16/2020 13.1     Microalb/Crt. Ratio (mcg/mg creat)   Date Value   12/05/2019 61 (H)     LDL Cholesterol (mg/dL)   Date Value   12/05/2019 130               Have you changed or started any medications since your last visit including any over-the-counter medicines, vitamins, or herbal medicines? no   Have you stopped taking any of your medications? Is so, why? -  no  Are you having any side effects from any of your medications? - no    Have you seen any other physician or provider since your last visit? yes    Have you had any other diagnostic tests since your last visit? yes -   Have you been seen in the emergency room and/or had an admission in a hospital since we last saw you?  yes      Have you had your annual diabetic retinal (eye) exam? No   (ensure copy of exam is in the chart)    Have you had your routine dental cleaning in the past 6 months? no    Do you have an active MyChart account? If not, what are your barriers? Yes    Patient Care Team:  Tyler Juarez MD as PCP - General (Family Medicine)  Merrill Sevilla DO as Consulting Physician (General Surgery)    Medical history Review  Past Medical, Family, and Social History reviewed and does not contribute to the patient presenting condition.     Health Maintenance   Topic Date Due    Hepatitis C screen  Never done    Varicella vaccine (1 of 2 - 2-dose childhood series) Never done    COVID-19 Vaccine (1) Never done    Hepatitis B vaccine (1 of 3 - Risk 3-dose series) Never done    Annual Wellness Visit (AWV)  Never done    Potassium monitoring  04/07/2020    Creatinine monitoring  04/07/2020    Diabetic microalbuminuria test  12/05/2020    Lipid screen  12/05/2020    A1C test (Diabetic or Prediabetic)  01/02/2021    Diabetic retinal exam 08/21/2021 (Originally 5/26/2017)    Diabetic foot exam  08/11/2021    Flu vaccine (Season Ended) 09/01/2021    DTaP/Tdap/Td vaccine (2 - Td) 08/15/2027    Pneumococcal 0-64 years Vaccine (2 of 2) 03/08/2045    HIV screen  Completed    Hepatitis A vaccine  Aged Out    Hib vaccine  Aged Out    Meningococcal (ACWY) vaccine  Aged Out

## 2021-06-04 ENCOUNTER — TELEPHONE (OUTPATIENT)
Dept: FAMILY MEDICINE CLINIC | Age: 41
End: 2021-06-04

## 2021-06-04 NOTE — TELEPHONE ENCOUNTER
Writer placed telephone call to patient in attempts to reschedule missed appointment on 06/03/21 with Dr. Johnna Lowe. Unable to leave Voicemail message to call office and reschedule as voicemail is full and is not accepting incoming calls. Message # 2.

## 2021-06-07 NOTE — TELEPHONE ENCOUNTER
Writer placed telephone call to patient in attempts to reschedule missed appointment on 06/03/21 with Dr. Bethany Parson. Unable to leave Voicemail message to call office and reschedule as voicemail is full and is not accepting incoming calls. Message # 3. Letter sent.

## 2021-08-17 ENCOUNTER — OFFICE VISIT (OUTPATIENT)
Dept: FAMILY MEDICINE CLINIC | Age: 41
End: 2021-08-17
Payer: COMMERCIAL

## 2021-08-17 VITALS
TEMPERATURE: 97.3 F | BODY MASS INDEX: 45.04 KG/M2 | SYSTOLIC BLOOD PRESSURE: 144 MMHG | DIASTOLIC BLOOD PRESSURE: 98 MMHG | HEART RATE: 77 BPM | HEIGHT: 67 IN | WEIGHT: 287 LBS

## 2021-08-17 DIAGNOSIS — Z79.4 TYPE 2 DIABETES MELLITUS WITHOUT COMPLICATION, WITH LONG-TERM CURRENT USE OF INSULIN (HCC): Primary | ICD-10-CM

## 2021-08-17 DIAGNOSIS — L30.1 DYSHIDROTIC ECZEMA: ICD-10-CM

## 2021-08-17 DIAGNOSIS — I10 ESSENTIAL HYPERTENSION: ICD-10-CM

## 2021-08-17 DIAGNOSIS — L98.9 SKIN LESION OF SCALP: ICD-10-CM

## 2021-08-17 DIAGNOSIS — M25.69 BACK STIFFNESS: ICD-10-CM

## 2021-08-17 DIAGNOSIS — E11.9 TYPE 2 DIABETES MELLITUS WITHOUT COMPLICATION, WITH LONG-TERM CURRENT USE OF INSULIN (HCC): Primary | ICD-10-CM

## 2021-08-17 LAB — HBA1C MFR BLD: 10.6 %

## 2021-08-17 PROCEDURE — 83036 HEMOGLOBIN GLYCOSYLATED A1C: CPT

## 2021-08-17 PROCEDURE — 99213 OFFICE O/P EST LOW 20 MIN: CPT

## 2021-08-17 PROCEDURE — 3046F HEMOGLOBIN A1C LEVEL >9.0%: CPT

## 2021-08-17 PROCEDURE — G8427 DOCREV CUR MEDS BY ELIG CLIN: HCPCS

## 2021-08-17 PROCEDURE — 1036F TOBACCO NON-USER: CPT

## 2021-08-17 PROCEDURE — 99211 OFF/OP EST MAY X REQ PHY/QHP: CPT

## 2021-08-17 PROCEDURE — 2022F DILAT RTA XM EVC RTNOPTHY: CPT

## 2021-08-17 PROCEDURE — G8417 CALC BMI ABV UP PARAM F/U: HCPCS

## 2021-08-17 RX ORDER — INSULIN GLARGINE 100 [IU]/ML
INJECTION, SOLUTION SUBCUTANEOUS
Qty: 5 PEN | Refills: 3 | Status: SHIPPED | OUTPATIENT
Start: 2021-08-17 | End: 2021-11-05

## 2021-08-17 RX ORDER — BLOOD PRESSURE TEST KIT
1 KIT MISCELLANEOUS DAILY
Qty: 1 KIT | Refills: 0 | Status: SHIPPED | OUTPATIENT
Start: 2021-08-17 | End: 2022-03-29

## 2021-08-17 RX ORDER — LISINOPRIL AND HYDROCHLOROTHIAZIDE 25; 20 MG/1; MG/1
1 TABLET ORAL DAILY
Qty: 30 TABLET | Refills: 5 | Status: SHIPPED | OUTPATIENT
Start: 2021-08-17 | End: 2021-12-21 | Stop reason: DRUGHIGH

## 2021-08-17 RX ORDER — GABAPENTIN 100 MG/1
300 CAPSULE ORAL 3 TIMES DAILY
Qty: 270 CAPSULE | Refills: 0 | Status: SHIPPED | OUTPATIENT
Start: 2021-08-17 | End: 2021-09-16

## 2021-08-17 RX ORDER — ATORVASTATIN CALCIUM 40 MG/1
40 TABLET, FILM COATED ORAL DAILY
Qty: 30 TABLET | Refills: 3 | Status: SHIPPED | OUTPATIENT
Start: 2021-08-17 | End: 2022-02-22 | Stop reason: SDUPTHER

## 2021-08-17 RX ORDER — SAL ACID/UREA/PETROLATUM,WHITE 5 %-10 %
1 OINTMENT (GRAM) TOPICAL DAILY
Qty: 30 EACH | Refills: 3 | Status: SHIPPED | OUTPATIENT
Start: 2021-08-17 | End: 2022-03-29

## 2021-08-17 RX ORDER — DIAPER,BRIEF,INFANT-TODD,DISP
EACH MISCELLANEOUS
Qty: 1 TUBE | Refills: 1 | Status: SHIPPED | OUTPATIENT
Start: 2021-08-17 | End: 2021-08-24

## 2021-08-17 SDOH — ECONOMIC STABILITY: FOOD INSECURITY: WITHIN THE PAST 12 MONTHS, THE FOOD YOU BOUGHT JUST DIDN'T LAST AND YOU DIDN'T HAVE MONEY TO GET MORE.: NEVER TRUE

## 2021-08-17 SDOH — ECONOMIC STABILITY: FOOD INSECURITY: WITHIN THE PAST 12 MONTHS, YOU WORRIED THAT YOUR FOOD WOULD RUN OUT BEFORE YOU GOT MONEY TO BUY MORE.: NEVER TRUE

## 2021-08-17 ASSESSMENT — PATIENT HEALTH QUESTIONNAIRE - PHQ9
2. FEELING DOWN, DEPRESSED OR HOPELESS: 0
1. LITTLE INTEREST OR PLEASURE IN DOING THINGS: 0
SUM OF ALL RESPONSES TO PHQ QUESTIONS 1-9: 0
SUM OF ALL RESPONSES TO PHQ9 QUESTIONS 1 & 2: 0

## 2021-08-17 ASSESSMENT — ENCOUNTER SYMPTOMS
SHORTNESS OF BREATH: 0
COUGH: 0
ABDOMINAL PAIN: 0
ABDOMINAL DISTENTION: 0
CHEST TIGHTNESS: 0

## 2021-08-17 ASSESSMENT — SOCIAL DETERMINANTS OF HEALTH (SDOH): HOW HARD IS IT FOR YOU TO PAY FOR THE VERY BASICS LIKE FOOD, HOUSING, MEDICAL CARE, AND HEATING?: NOT VERY HARD

## 2021-08-17 NOTE — PROGRESS NOTES
[facility] Medicine Res[facility] Program - Outpatient Note      Subjective:    Carolyn Montemayor is a 39 y.o. male with  has a past medical history of Diabetes mellitus ([address] Erectile dysfunction, Hypertension, Morbid obesity ([address] and Type 2 diabetes mellitus without complication ([address] Presented to the office today for:  Chief Complaint   Patient presents with    Diabetes    Hypertension    Generalized Body Aches     when getting out of bed or after driving     Referral - General     foot doctor        KYLE    Momo Milton is a 39year-old male presenting to the clinic for follow up on his diabetes. Patient states he is compliant with his medications but lives a very sedentary lifestyle and diet consists of high sugar juices, white rice and chicken/fish. Patient also has concerns regarding peeling skin between his toes and a mole on the right side of his scalp which was previously removed by [facility] surgery in 2017. Patient notes that he has also been dealing with lower back stiffness and lower extremity stiffness for the past 6 months. No tingling, pain or weakness. Stiffness improves with stretching. Review of Systems   Constitutional: Negative for chills, fatigue and fever. Respiratory: Negative for cough, chest tightness and shortness of breath. Cardiovascular: Negative for chest pain, palpitations and leg swelling. Gastrointestinal: Negative for abdominal distention and abdominal pain. Neurological: Negative for weakness and numbness.        The patient has a   Family History   Problem Relation Age of Onset    Diabetes Mother     High Cholesterol Mother     High Blood Pressure Mother     Heart Disease Mother        Objective:    BP (!) 144/98 (Site: Left Upper Arm, Position: Sitting, Cuff Size: Large Adult)   Pulse 77   Temp 97.3 °F (36.3 °C) (Temporal)   Ht 5' 7\" (1.702 m)   Wt 287 lb (130.2 kg)   BMI 44.95 kg/m²    BP Readings from Last 3 Encounters:   08/17/21 (!) 144/98   05/20/21 138/86   05/05/21 137/84       Physical Exam  Constitutional:       Appearance: Normal appearance. He is obese. HENT:      Head:     Cardiovascular:      Rate and Rhythm: Normal rate and regular rhythm. Heart sounds: Normal heart sounds. Pulmonary:      Effort: Pulmonary effort is normal.      Breath sounds: Normal breath sounds. Skin:     General: Skin is warm. Neurological:      General: No focal deficit present. Mental Status: He is alert and oriented to person, place, and time. Diabetic Foot Exam:  Visual inspection: callus on soles. No erythema or fungus. Peeling between toes - consistent with dyshidrotic eczema  Deformity/amputation: absent  Skin lesions/pre-ulcerative calluses: absent  Edema: right- trace, left- trace    Sensory exam:  Monofilament sensation: normal (10 points checked bilaterally and all were felt)  (minimum of 5 random plantar locations tested, avoiding callused areas - > 1 area with absence of sensation is + for neuropathy)    Plus at least one of the following:  Pulses: normal, bilaterally  Vibration (128 Hz): Intact    Lab Results   Component Value Date    WBC 9.0 04/07/2019    HGB 16.8 04/07/2019    HCT 49.2 04/07/2019     04/07/2019    CHOL 212 (H) 05/20/2021    TRIG 154 (H) 05/20/2021    HDL 50 05/20/2021    ALT 98 (H) 04/07/2019    AST 71 (H) 04/07/2019     05/20/2021    K 4.0 05/20/2021    CL 95 (L) 05/20/2021    CREATININE 0.83 05/20/2021    BUN 9 05/20/2021    CO2 25 05/20/2021    TSH 1.04 02/21/2013    LABA1C 10.6 08/17/2021    LABMICR 150 (H) 05/20/2021     Lab Results   Component Value Date    CALCIUM 10.1 05/20/2021     Lab Results   Component Value Date    LDLCHOLESTEROL 131 (H) 05/20/2021       Assessment and Plan:    1.  Type 2 diabetes mellitus without complication, with long-term current use of insulin ([address]  -Patient compliant with medication, had extensive conversation with patient regarding diet (restricting simple carbohydrates and high sugar drinks, in addition to low sodium). -Discussed daily exercise for 30-45 minutes consisting of aerobic and resistance training. Explained to patient in depth what this consists of.   -Counseled patient on the importance of controlling blood glucose more strictly as there is possible and likely risk of cardiovascular issues (stroke, MI), kidney injury, foot ulcers and visual impairment.   -Patient says he takes 60 units of Lantus nightly - will increase this to 70 units. Patient agreeable to plan. -Will have patient follow up in 1 month for repeat HbA1C  -  DIABETES FOOT EXAM  - atorvastatin (LIPITOR) 40 MG tablet; Take 1 tablet by mouth daily  Dispense: 30 tablet; Refill: 3  - gabapentin (NEURONTIN) 100 MG capsule; Take 3 capsules by mouth 3 times daily for 30 days. Intended supply: 30 days  Dispense: 270 capsule; Refill: 0  - insulin glargine (LANTUS SOLOSTAR) 100 UNIT/ML injection pen; Take 70 units nightly  Dispense: 5 pen; Refill: 3  - SITagliptin (JANUVIA) 100 MG tablet; take 1 tablet by mouth once daily  Dispense: 90 tablet; Refill: 0  - POCT glycosylated hemoglobin (Hb A1C)    2. Essential hypertension  Patient does not check blood pressure at home. Office readings elevated 144/98.   -Advise better control with diet (low sodium) in addition to exercise/weight loss. -Informed patient to check his BP at home twice a day and keep a log to review at next visit. - Blood Pressure KIT; 1 actuation by Does not apply route daily  Dispense: 1 kit; Refill: 0  - lisinopril-hydroCHLOROthiazide (PRINZIDE;ZESTORETIC) 20-25 MG per tablet; Take 1 tablet by mouth daily  Dispense: 30 tablet; Refill: 5    3. Dyshidrotic eczema  Chronic skin peeling between toes. No fungus. Most likely dyshidrotic eczema.  -Patient informed to soak feet in Domeboro solution and then apply topical hydrocortisone cream daily.   - Alum Sulfate-Ca Acetate (Verita Certain) PACK;  Apply 1 packet topically daily Dissolve 1 packet into water and soak feet daily  Dispense: 30 each; Refill: 3  - hydrocortisone (ALA-CARMEN) 1 % cream; Apply topically 2 times daily. Dispense: 1 Tube; Refill: 1    4. Skin lesion of scalp  Raised brown mole on right scalp in temporal region. Patient had same mole several years ago which was removed surgically by [facility] Surgical team in 2017. Was noted in chart that should there be recurrence patient will need excision.   -Prior biopsy was negative for malignancy.   -Currently no pain or pruritus of area. - [facility]    5. Back stiffness  -lower back and leg stiffness for the past 6 months, no associated neuropathy, incontinence or weakness of back or lower extremities  -Patient lives very sedentary lifestyle, sits most of the day. Stretching in the morning improves stiffness.  -No trauma/injury  -Provided patient with additional stretches to do at home. If there is minima improvement, can consider PT. Requested Prescriptions     Signed Prescriptions Disp Refills    atorvastatin (LIPITOR) 40 MG tablet 30 tablet 3     Sig: Take 1 tablet by mouth daily    Blood Pressure KIT 1 kit 0     Si actuation by Does not apply route daily    gabapentin (NEURONTIN) 100 MG capsule 270 capsule 0     Sig: Take 3 capsules by mouth 3 times daily for 30 days. Intended supply: 30 days    insulin glargine (LANTUS SOLOSTAR) 100 UNIT/ML injection pen 5 pen 3     Sig: Take 70 units nightly    lisinopril-hydroCHLOROthiazide (PRINZIDE;ZESTORETIC) 20-25 MG per tablet 30 tablet 5     Sig: Take 1 tablet by mouth daily    SITagliptin (JANUVIA) 100 MG tablet 90 tablet 0     Sig: take 1 tablet by mouth once daily    Alum Sulfate-Ca Acetate (DOMEBORO) PACK 30 each 3     Sig: Apply 1 packet topically daily Dissolve 1 packet into water and soak feet daily    hydrocortisone (ALA-CARMEN) 1 % cream 1 Tube 1     Sig: Apply topically 2 times daily.        Medications Discontinued During This Encounter   Medication Reason    ibuprofen (ADVIL;MOTRIN) 800 MG tablet LIST CLEANUP    metFORMIN (GLUMETZA) 1000 MG extended release tablet LIST CLEANUP    naproxen (NAPROSYN) 500 MG tablet LIST CLEANUP    lisinopril-hydroCHLOROthiazide (PRINZIDE;ZESTORETIC) 20-25 MG per tablet REORDER    Blood Pressure KIT REORDER    SITagliptin (JANUVIA) 100 MG tablet REORDER    insulin glargine (LANTUS SOLOSTAR) 100 UNIT/ML injection pen REORDER    gabapentin (NEURONTIN) 100 MG capsule REORDER    atorvastatin (LIPITOR) 40 MG tablet REORDER       Ryder received counseling on the following healthy behaviors: nutrition, exercise and medication adherence    Discussed use,benefit, and side effects of prescribed medications. Barriers to medication compliance addressed. All patient questions answered. Pt voiced understanding. Return in about 4 weeks (around 9/14/2021), or if symptoms worsen or fail to improve, for Diabetes and HTN. Patient to be scheduled with [facility] for surgical excision of mole. Disclaimer: Some orall of this note was transcribed using voice-recognition software. This may cause typographical errors occasionally. Although all effort is made to fix these errors, please do not hesitate to contact our office if there Renato Godinez concern with the understanding of this note.

## 2021-08-17 NOTE — PROGRESS NOTES
Patient carefully counseled and educated on possible and likely consequences  of uncontrolled disease, including heart disease, stroke, and kidney damage. Was able to teach back to me the points of our discussion. I have reviewed and discussed key elements of [address] with the resident including plan of care and follow up and agree with the care mitch plan.

## 2021-08-18 ENCOUNTER — OFFICE VISIT (OUTPATIENT)
Dept: SURGERY | Age: 41
End: 2021-08-18
Payer: COMMERCIAL

## 2021-08-18 VITALS
WEIGHT: 288 LBS | DIASTOLIC BLOOD PRESSURE: 99 MMHG | HEART RATE: 83 BPM | BODY MASS INDEX: 45.11 KG/M2 | SYSTOLIC BLOOD PRESSURE: 147 MMHG

## 2021-08-18 DIAGNOSIS — D23.9 ECCRINE POROMA: Primary | ICD-10-CM

## 2021-08-18 PROCEDURE — G8427 DOCREV CUR MEDS BY ELIG CLIN: HCPCS | Performed by: STUDENT IN AN ORGANIZED HEALTH CARE EDUCATION/TRAINING PROGRAM

## 2021-08-18 PROCEDURE — 99212 OFFICE O/P EST SF 10 MIN: CPT | Performed by: STUDENT IN AN ORGANIZED HEALTH CARE EDUCATION/TRAINING PROGRAM

## 2021-08-18 PROCEDURE — G8417 CALC BMI ABV UP PARAM F/U: HCPCS | Performed by: STUDENT IN AN ORGANIZED HEALTH CARE EDUCATION/TRAINING PROGRAM

## 2021-08-18 PROCEDURE — 1036F TOBACCO NON-USER: CPT | Performed by: STUDENT IN AN ORGANIZED HEALTH CARE EDUCATION/TRAINING PROGRAM

## 2021-08-18 NOTE — PROGRESS NOTES
[facility]   PROGRESS NOTE    DATE: August 18, 2021     SUBJECTIVE:  Dorys Avila is a 39 y.o. male who returns to the [facility] surgery clinic for evaluation of lesion of the right temporal scalp that was previously excised 4 years ago. 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 Patient states that the lesion has slowly been growing over the past few years has now reached the same size as when it was previously removed. Patient states the lesion is not bleeding or causing significant pain but that it is continuing to grow in size and is making it difficult while wearing his glasses or hats due to the location of the lesion. Patient states otherwise he feels well, denies nausea, vomiting, chest pain, shortness of breath, diarrhea, constipation. Patient denies tobacco use, alcohol use, or illicit drug use. Patient is an poorly controlled diabetic with most recent A1c greater than 10 and currently on insulin shots. Past Medical History:   Diagnosis Date    Diabetes mellitus [address]     Erectile dysfunction 8/15/2017    Hypertension     Morbid obesity ([facility] 4/28/2016    Type 2 diabetes mellitus without complication ([facility] 0/85/8953       Past Surgical History:   Procedure Laterality Date    PRE-MALIGNANT / BENIGN SKIN LESION EXCISION Right 08/22/2017    rt scalp lesion removed    TONSILLECTOMY         Current Outpatient Medications   Medication Sig Dispense Refill    atorvastatin (LIPITOR) 40 MG tablet Take 1 tablet by mouth daily 30 tablet 3    Blood Pressure KIT 1 actuation by Does not apply route daily 1 kit 0    gabapentin (NEURONTIN) 100 MG capsule Take 3 capsules by mouth 3 times daily for 30 days.  Intended supply: 30 days 270 capsule 0    insulin glargine (LANTUS SOLOSTAR) 100 UNIT/ML injection pen Take 70 units nightly 5 pen 3    lisinopril-hydroCHLOROthiazide (PRINZIDE;ZESTORETIC) 20-25 MG per tablet Take 1 tablet by mouth daily 30 tablet 5    SITagliptin (JANUVIA) 100 MG tablet take 1 tablet by mouth once daily 90 tablet 0    Alum Sulfate-Ca Acetate (DOMEBORO) PACK Apply 1 packet topically daily Dissolve 1 packet into water and soak feet daily 30 each 3    hydrocortisone (ALA-CARMEN) 1 % cream Apply topically 2 times daily. 1 Tube 1    tiZANidine (ZANAFLEX) 2 MG tablet Take 1 tablet by mouth every 8 hours as needed (back pain) 15 tablet 0    nystatin (MYCOSTATIN) 037866 UNIT/GM powder Apply topically 4 times daily. 15 g 0    Insulin Pen Needle (B-D UF III MINI PEN NEEDLES) 31G X 5 MM MISC USE ONCE DAILY 100 each 3    Colloidal Oatmeal (AVEENO ECZEMA THERAPY) 1 % CREA Apply 1 Tube topically as needed (use as needed) 1 Tube 2    B-D UF III MINI PEN NEEDLES 31G X 5 MM MISC USE ONCE DAILY  0    Glucose Blood (BLOOD GLUCOSE TEST STRIPS) STRP 1 each by In Vitro route 3 times daily 30 strip 3    SOFT TOUCH LANCETS MISC 1 each by Does not apply route 3 times daily 100 each 3    glucose monitoring kit (FREESTYLE) monitoring kit Check blood sugar three times daily 1 kit 0    Alcohol Swabs (ALCOHOL PREP) 70 % PADS 1 each by Does not apply route 3 times daily 100 each 1     No current facility-administered medications for this visit.        No Known Allergies    Family History   Problem Relation Age of Onset    Diabetes Mother     High Cholesterol Mother     High Blood Pressure Mother     Heart Disease Mother        Social History     Socioeconomic History    Marital status: Single     Spouse name: Not on file    Number of children: Not on file    Years of education: Not on file    Highest education level: Not on file   Occupational History    Not on file   Tobacco Use    Smoking status: Never Smoker    Smokeless tobacco: Never Used   Substance and Sexual Activity    Alcohol use: No    Drug use: No    Sexual activity: Not on file   Other Topics Concern    Not on file   Social History Narrative    Not on file     Social Determinants of Health     Financial Resource Strain: Low Risk     Difficulty of Paying Living Expenses: Not very hard   Food Insecurity: No Food Insecurity    Worried About Running Out of Food in the Last Year: Never true    Quinn of Food in the Last Year: Never true   Transportation Needs:     Lack of Transportation (Medical):  Lack of Transportation (Non-Medical):    Physical Activity:     Days of Exercise per Week:     Minutes of Exercise per Session:    Stress:     Feeling of Stress :    Social Connections:     Frequency of Communication with Friends and Family:     Frequency of Social Gatherings with Friends and Family:     Attends Alevism Services:     Active Member of Clubs or Organizations:     Attends Club or Organization Meetings:     Marital Status:    Intimate Partner Violence:     Fear of Current or Ex-Partner:     Emotionally Abused:     Physically Abused:     Sexually Abused:        ROS:  GEN: Denies recent weight loss, fatigue, fevers, chills. HEENT: No rhinorrhea, dysphagia, odynphagia. CV: No history of MI, recent chest pain. RESP: Denies shortness of breath, COPD, asthma. GI: Denies nausea, vomiting, constipation, diarrhea  : Denies increased frequency or dysuria. HEM[de-identified] Denies history of anemia or DVTs. ENDO: Poorly controlled insulin-dependent DM 2  NEURO: Denies history of CVA, TIA. Notes reviewed, and agree with documentation in pt's chart. PHYSICAL EXAM:  Vitals:    08/18/21 0941   BP: (!) 147/99   Pulse: 83     GEN: Alert and oriented x 3. In no acute distress. Appears stated age. HEENT: PERRLA, EOMI  NECK: trachea midline  HEART: Regular rate and rhythm    LUNGS: symmetrical chest rise bilaterally  ABDOMEN: Soft, nondistended, nontender  EXT: no cyanosis, clubbing or edema present    NEURO: no focal deficits, gross motor intact.   SKIN: Small lesion approximately 1.2 cm x 1 cm in size within the hair of the right temporal scalp,

## 2021-10-06 ENCOUNTER — OFFICE VISIT (OUTPATIENT)
Dept: SURGERY | Age: 41
End: 2021-10-06
Payer: COMMERCIAL

## 2021-10-06 VITALS — RESPIRATION RATE: 12 BRPM | HEIGHT: 67 IN | BODY MASS INDEX: 45.2 KG/M2 | WEIGHT: 288 LBS

## 2021-10-06 DIAGNOSIS — D48.5 NEOPLASM OF UNCERTAIN BEHAVIOR OF SKIN: Primary | ICD-10-CM

## 2021-10-06 PROCEDURE — G8484 FLU IMMUNIZE NO ADMIN: HCPCS | Performed by: PLASTIC SURGERY

## 2021-10-06 PROCEDURE — 1036F TOBACCO NON-USER: CPT | Performed by: PLASTIC SURGERY

## 2021-10-06 PROCEDURE — G8417 CALC BMI ABV UP PARAM F/U: HCPCS | Performed by: PLASTIC SURGERY

## 2021-10-06 PROCEDURE — 99203 OFFICE O/P NEW LOW 30 MIN: CPT | Performed by: PLASTIC SURGERY

## 2021-10-06 PROCEDURE — G8427 DOCREV CUR MEDS BY ELIG CLIN: HCPCS | Performed by: PLASTIC SURGERY

## 2021-10-06 NOTE — LETTER
[facility]  [address] [place] [facility] [address]  Phone: [phone]  Fax: [phone]           Rosa Cannon MD      October 6, 2021     Patient: Destiney Muñiz   MR Number: [id number]   YOB: 1980   Date of Visit: 10/6/2021       Dear Dr. Hilton Shelter: Thank you for referring Lev Zhou to me for evaluation/treatment. Below are the relevant portions of my assessment and plan of care. 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 Several options were discussed with the patient. Patient elected to have it excised under general anesthesia. The risk of excision were discussed with patient in detail. All questions were answered. If you have questions, please do not hesitate to call me. I look forward to following Ryder along with you.     Sincerely,        Rosa Cannon MD    CC providers:  Steve Carbone, [address] [facility] [id number]  [address]

## 2021-10-06 NOTE — PROGRESS NOTES
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 Asuncion Vázquez, referred by Matthew Mar        HPI:   Patricia Diaz is a 39 y.o. 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 Patient has had a recurrence. Patient was referred for evaluation treatment. Medications:     Current Outpatient Medications   Medication Sig Dispense Refill    atorvastatin (LIPITOR) 40 MG tablet Take 1 tablet by mouth daily 30 tablet 3    Blood Pressure KIT 1 actuation by Does not apply route daily 1 kit 0    gabapentin (NEURONTIN) 100 MG capsule Take 3 capsules by mouth 3 times daily for 30 days. Intended supply: 30 days 270 capsule 0    insulin glargine (LANTUS SOLOSTAR) 100 UNIT/ML injection pen Take 70 units nightly 5 pen 3    lisinopril-hydroCHLOROthiazide (PRINZIDE;ZESTORETIC) 20-25 MG per tablet Take 1 tablet by mouth daily 30 tablet 5    SITagliptin (JANUVIA) 100 MG tablet take 1 tablet by mouth once daily 90 tablet 0    Alum Sulfate-Ca Acetate (DOMEBORO) PACK Apply 1 packet topically daily Dissolve 1 packet into water and soak feet daily 30 each 3    tiZANidine (ZANAFLEX) 2 MG tablet Take 1 tablet by mouth every 8 hours as needed (back pain) 15 tablet 0    nystatin (MYCOSTATIN) 448817 UNIT/GM powder Apply topically 4 times daily.  15 g 0    Insulin Pen Needle (B-D UF III MINI PEN NEEDLES) 31G X 5 MM MISC USE ONCE DAILY 100 each 3    Colloidal Oatmeal (AVEENO ECZEMA THERAPY) 1 % CREA Apply 1 Tube topically as needed (use as needed) 1 Tube 2    B-D UF III MINI PEN NEEDLES 31G X 5 MM MISC USE ONCE DAILY  0    Glucose Blood (BLOOD GLUCOSE TEST STRIPS) STRP 1 each by In Vitro route 3 times daily 30 strip 3    SOFT TOUCH LANCETS MISC 1 each by Does not apply route 3 times daily 100 each 3    glucose monitoring kit (FREESTYLE) monitoring kit Check blood sugar three times daily 1 kit 0    Alcohol Swabs (ALCOHOL PREP) 70 % PADS 1 each by Does not apply route 3 times daily 100 each 1     No current facility-administered medications for this visit. Allergies:   No Known Allergies  Review of Systems:   Constitutional: Negative for fever, chills, fatigue and unexpected weight change. HENT: Negative for hearing loss, sore throat and facial swelling. Eyes: Negative for pain and discharge. Respiratory: Negative for cough and shortness of breath. Cardiovascular: Patient has a history of hypertension. Gastrointestinal: Negative for nausea, vomiting, diarrhea and constipation. Skin: Negative for pallor and rash. Neurological: Negative for seizures, syncope and numbness. Hematological: Does not bruise/bleed easily. Psychiatric/Behavioral: Negative for behavioral problems. The patient is not nervous/anxious. Endocrine: Patient has a history of diabetes.  patient has a history of erectile dysfunction.   Past Medical History:   Diagnosis Date    Diabetes mellitus ([facility]     Erectile dysfunction 8/15/2017    Hypertension     Morbid obesity ([facility] 4/28/2016    Type 2 diabetes mellitus without complication ([facility] 8/71/6144     Past Surgical History:   Procedure Laterality Date    PRE-MALIGNANT / BENIGN SKIN LESION EXCISION Right 08/22/2017    rt scalp lesion removed    TONSILLECTOMY       Social History     Socioeconomic History    Marital status: Single     Spouse name: Not on file    Number of children: Not on file    Years of education: Not on file    Highest education level: Not on file   Occupational History    Not on file   Tobacco Use    Smoking status: Never Smoker    Smokeless tobacco: Never Used   Substance and Sexual Activity    Alcohol use: No    Drug use: No    Sexual activity: Not on file   Other Topics Concern    Not on file   Social History Narrative    Not on file     Social Determinants of Health     Financial Resource Strain: Low Risk     Difficulty of Paying Living Expenses: Not very hard   Food Insecurity: No Food Insecurity    Worried About Running Out of Food in the Last Year: Never true    Quinn of Food in the Last Year: Never true   Transportation Needs:     Lack of Transportation (Medical):  Lack of Transportation (Non-Medical):    Physical Activity:     Days of Exercise per Week:     Minutes of Exercise per Session:    Stress:     Feeling of Stress :    Social Connections:     Frequency of Communication with Friends and Family:     Frequency of Social Gatherings with Friends and Family:     Attends Yarsani Services:     Active Member of Clubs or Organizations:     Attends Club or Organization Meetings:     Marital Status:    Intimate Partner Violence:     Fear of Current or Ex-Partner:     Emotionally Abused:     Physically Abused:     Sexually Abused:      Family History   Problem Relation Age of Onset    Diabetes Mother     High Cholesterol Mother     High Blood Pressure Mother     Heart Disease Mother      Physical Exam:   Resp 12   Ht 5' 7\" (1.702 m)   Wt 288 lb (130.6 kg)   BMI 45.11 kg/m²    Body mass index is 45.11 kg/m². Physical Exam   Nursing note and vitals reviewed. Constitutional: Oriented to person, place, and time. Appears well-developed and well-nourished. No distress. Head: Normocephalic and atraumatic. Eyes: Conjunctivae and EOM are normal.   Pulmonary/Chest: Effort normal. No respiratory distress. Neurological: Alert and oriented to person, place, and time. Skin:       Patient with eccrine poroma a on the right anterior scalp    Psychiatric: Normal mood and affect. Behavior is normal    Laboratory:   Patient Name: Qian Duarte    Med Rec: [id number]   Path Number: [id number]   Collected: 8/22/2017   Received: 8/23/2017   Reported: 8/24/2017 19:42     -- Diagnosis --   SKIN, RIGHT SCALP, EXCISION:     -  PORTIONS OF TRAUMATIZED ECCRINE POROMA. -- Diagnosis Comment --   THERE IS NO EVIDENCE OF MALIGNANCY.  IF THERE IS ANY RESIDUAL   NODULARITY CLINICALLY, OR IF THE LESION RECURS, COMPLETE EXCISION   WOULD BE RECOMMENDED. Mikel Thacker M.D.   **Electronically Signed Out**   jet/8/24/2017       Clinical Information   Pre-op Diagnosis:  SCALP LESION   Operative Findings:  RIGHT SCALP   Operation Performed:  EXCISION     Source of Specimen   1: RIGHT SCALP LESION     Gross Description   \"SHELBY MURPHYLER, RIGHT SCALP\" An unoriented elliptical fragment of tan   skin with subcutaneous tissue, 1.7 x 1.0 x up to 0.8 cm in thickness. The skin surface contains a red dome-shaped area, 1.0 x 0.7 cm.  This   area appears to extend to the inked resection margin.  The remaining   skin surface is tan and wrinkled.  The resection margin is inked   black.  Also received in the same container is an unoriented flattened   fragment of tan soft tissue, 1.0 x 0.3 x 0.1 cm.  The surfaces are   irregular, shaggy and inked black.  Cassette summary:  \"A\" elliptical   fragment serially sectioned, \"B\" smaller fragment en face.  tm       Microscopic Description   The larger excisions show skin to the periappendageal fat and a   polypoid proliferation of anastomosing sheets of pigmented epithelial   cells with relatively uniform oval nuclei.  Surface erosion and   parakeratotic scale with inflammatory crust is present, consistent   with trauma.  Areas of primitive ductal differentiation are noted. The lesion extends to the base of the main excision, but shows no   evidence of an infiltrating growth pattern.  The second fragment of   soft tissue received shows residual tumor which extend to tissue   edges, however this is not oriented and therefore true margins cannot   be determined. Impression/Plan:      Diagnosis Orders   1.  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 Several options were discussed with the patient. Patient elected to have it excised under general anesthesia. The risk of excision were discussed with patient in detail. All questions were answered. The following information was discussed with the patient, but this is not an all-inclusive-other issue were also discussed with patient. GENERAL INFORMATION  The surgical removal of skin lesions and tumors is frequently performed by plastic surgeons. Certain skin lesions and skin tumors will not disappear spontaneously, surgical removal is a treatment option. There are many different techniques for removing skin lesions and skin tumors. ALTERNATIVE TREATMENTS  Alternative forms of management consist of not treating the skin lesion/skin tumor condition. Removal of skin lesions and some superficial skin tumors may be accomplished by other treatments including the use of liquid nitrogen (freezing), lasers, topical medications, and electric cautery. Risks and potential complications are associated with alternative forms of treatment. Deeper tumors cannot be treated by this. RISKS OF SKIN LESION/SKIN TUMOR SURGERY    I have explained to the patient that every surgical procedure involves a certain amount of risk and it is important that an understanding of these risks and the possible complications associated with them is understood. In addition, every procedure has limitations. An individual's choice to undergo a surgical procedure is based on the comparison of the risk to potential benefit. Although some of patients do not experience these complications.     Bleeding- It is possible, to experience a bleeding episode during or after surgery. Intraoperative blood transfusions may be required. Should post-operative bleeding occur, it may require an emergency treatment to drain the accumulated blood or blood transfusion. Do not take any aspirin or anti-inflammatory medications for ten days before or after surgery, as this may increase the risk of bleeding. Non-prescription herbs and dietary supplements can increase the risk of surgical bleeding. Hematoma can occur at any time following injury. If blood transfusions are necessary to treat blood loss, there is the risk of blood-related infections such as hepatitis and HIV (AIDS). Heparin medications that are used to prevent blood clots in veins can produce bleeding and decreased blood platelets. Please check with the physician who prescribed that blood thinners such as aspirin Coumadin etc. Prior to stopping them. Infection- Infection can occur after surgery. Should an infection occur, additional treatment including antibiotics, hospitalization, or additional surgery may be necessary. Scarring- All surgery leaves scars, some more visible than others. Although wound healing after a surgical procedure is expected, abnormal scars may occur within the skin and deeper tissues. Scars may be unattractive and of different color than the surrounding skin tone. Scar appearance may also vary within the same scar. There is the possibility of visible marks from sutures used to close the wound after the removal of skin lesions and tumors. There is the possibility that scars may limit motion and function. In some cases, scars may require surgical revision or treatment. Obesity: If you're BMI is greater than 30 you may have a higher chance of complications. This may include but not limited to wound healing and infections.   Also, if you have other medical problems such as diabetes and hypertension it may affect your healing as well as your surgical results in bleeding. Damage to Deeper Structures- There is the potential for injury to deeper structures including nerves, blood vessels, muscles, and lungs (pneumothorax) during any surgical procedure. The potential for this to occur varies according to where on the body surgery is being performed. Injury to deeper structures may be temporary or permanent. Cancer- In some situations, a skin lesion or tumor that appears to be benign may be determined to be cancerous after laboratory analysis. Additional treatments or surgery may be necessary. Recurrence- In some situation, skin lesions and tumors can recur after surgical excision. Additional treatment or secondary surgery may be necessary. Skin Discoloration / Swelling- Some bruising and swelling normally occurs following surgery. The skin in or near the surgical site can appear either lighter or darker than surrounding skin. Although uncommon, swelling and skin discoloration may persist for long periods of time and, in rare situations, may be permanent. Skin Sensitivity- Itching, tenderness, or exaggerated responses to hot or cold temperatures may occur after surgery. Usually this resolves during healing, but in some situations, it may be chronic, permanent. Pain- You will experience pain after your surgery. Pain of varying intensity and duration may occur and persist after surgery. Chronic pain may occur from nerves becoming trapped in scar tissue. Sutures- Some surgical techniques use deep sutures. You may notice these sutures after your surgery. Sutures may spontaneously poke through the skin, become visible or produce irritation that requires removal.  Delayed Healing- Wound disruption or delayed wound healing is possible. Some areas of the skin may not heal normally and may take a long time to heal.  Some areas of skin may die, requiring frequent dressing changes or further surgery to remove the non-healed tissue.   Smokers have a greater risk of skin loss and wound healing complications. Skin Contour Irregularities- Contour irregularities and depressions may occur after surgery. Visible and palpable wrinkling of skin can occur. Residual skin irregularities at the ends of the incisions or dog ears are always a possibility and may require additional surgery. This may improve with time, or it can be surgically corrected. Allergic Reactions- In some cases, local allergies to tape, suture materials and glues, blood products, topical preparations or injected agents have been reported. Serious systemic reactions including shock (anaphylaxis) may occur to drugs used during surgery and prescription medications. Allergic reactions may require additional treatment. Surgical Anesthesia- Both local and general anesthesia involve risk. There is the possibility of complications, injury, and even death from all forms of surgical anesthesia or sedation. Change in Skin Sensation- It is common to experience diminished (or loss) of skin sensation in areas that have had surgery. Diminished (or complete loss of skin sensation) may not totally resolve. Shock- In rare circumstances, your surgical procedure can cause severe trauma, particularly when multiple or extensive procedures are performed. Although serious complications are infrequent, infections or excessive fluid loss can lead to severe illness and even death. If surgical shock occurs, hospitalization and additional treatment would be necessary. Unsatisfactory Result- There is no guarantee or warranty expressed or implied, on the results that may be obtained. There is the possibility of a poor result from the removal of skin lesions and tumors. This would include risks such as unacceptable visible deformities, loss of function, poor healing, wound disruption, skin death and loss of sensation. You may be disappointed with the results of reconstructive surgery.  It may be necessary to perform additional surgery to improve your results. Cardiac and Pulmonary Complications- Surgery, especially longer procedures, may be associated with the formation of, or increase in, blood clots in the venous system. Pulmonary complications may occur secondarily to both blood clots (pulmonary emboli), fat deposits (fat emboli) or partial collapse of the lungs after general anesthesia. Pulmonary and fat emboli can be life-threatening or fatal in some circumstances. Air travel, inactivity and other conditions may increase the incidence of blood clots traveling to the lungs causing a major blood clot that may result in death. It is important to discuss with your physician any past history of blood clots or swollen legs that may contribute to this condition. Cardiac complications are a risk with any surgery and anesthesia, even in patients without symptoms. If you experience shortness of breath, chest pains, or unusual heart beats, seek medical attention immediately. Should any of these complications occur, you may require hospitalization and additional treatment. Smoking, Second-Hand Smoke Exposure, Nicotine Products (Patch, Gum, Nasal Spray)-   Patients who are currently smoking, use tobacco products, or nicotine products (patch, gum, or nasal spray) are at a greater risk for significant surgical complications of skin dying, delayed healing, and additional scarring. Individuals exposed to second-hand smoke are also at potential risk for similar complications attributable to nicotine exposure. Additionally, smoking may have a significant negative effect on anesthesia and recovery from anesthesia, with coughing and possibly increased bleeding. Individuals who are not exposed to tobacco smoke or nicotine-containing products have a significantly lower risk of this type of complication. It is important to refrain from smoking at least 8-week weeks before and after surgery.  This may apply to secondhand smoking. Mental Health Disorders and Surgery- It is important that all patients seeking to undergo surgery have realistic expectations that focus on improvement rather than perfection. Complications or less than satisfactory results are sometimes unavoidable, may require additional surgery and often are stressful. Please openly discuss with your surgeon, prior to surgery, any history that you may have of significant emotional depression or mental health disorders. Although many individuals may benefit psychologically from the results of surgery, effects on mental health cannot be accurately predicted. Medications- There are many adverse reactions that occur as the result of taking over the counter, herbal, and/or prescription medications. Be sure to check with your physician about any drug interactions that may exist with medications which you are already taking. If you have an adverse reaction, stop the drugs immediately and call your plastic surgeon for further instructions. If the reaction is severe, go immediately to the nearest emergency room. When taking the prescribed pain medications after surgery, realize that they can affect your thought process and coordination. Do not drive, do not operate complex equipment, do not make any important decisions, and do not drink any alcohol while taking these medications. Be sure to take your prescribed medication only as directed. ADDITIONAL SURGERY NECESSARY  Should complications occur, additional surgery or other treatments may be necessary. Even though risks and complications occur infrequently, the risks cited are the ones that are particularly associated with skin lesion and skin tumor removal.  Other complications and risks can occur but are even more uncommon. The practice of medicine and surgery is not an exact science.   Although good results are expected, there is no guarantee or warranty expressed or implied, on the results that may be obtained. PATIENT COMPLIANCE   Follow all physician instructions carefully; this is essential for the success of your outcome. It is important that the surgical incisions are not subjected to excessive force, swelling, abrasion, or motion during the time of healing. Protective dressings and drains should not be removed unless instructed by your plastic surgeon. Successful post-operative function depends on both surgery and subsequent care. Physical activity that increases your pulse or heart rate may cause bruising, swelling, fluid accumulation and the need for return to surgery. It is wise to refrain from intimate physical activities after surgery until your physician states it is safe. It is important that you participate in follow-up care, return for aftercare, and promote your recovery after surgery. HEALTH INSURANCE  Most health insurance companies exclude coverage for cosmetic surgical operations or any complications that might occur from surgery. Please carefully review your health insurance subscriber information pamphlet. Most insurance plans exclude coverage for secondary or revisionary surgery. Your type of surgery will most likely be covered by your insurance company, I went there is no guarantees or warrantees implied or otherwise. The cost of surgery involves several charges for the services provided. The total includes fees charged by your surgeon, the cost of surgical supplies, anesthesia, laboratory tests, and possible outpatient hospital charges, depending on where the surgery is performed. Depending on whether the cost of surgery is covered by an insurance plan, you will be responsible for necessary co-payments, deductibles, and charges not covered. The fees charged for this procedure do not include any potential future costs for additional procedures that you elect to have or require in order to revise, optimize, or complete your outcome.   Additional costs may occur should complications develop from the surgery. Secondary surgery or hospital day-surgery charges involved with revision surgery will also be your responsibility. II have also explained to the patient that we may obtain photographs. These images or illustration along with my medical records created in my case may be used for obtaining insurance approval, for use in examination, may assist in education, testing, credentialing and or certifying by [facility]. These images and records may be used to communicate with referring physician.     Electronically signed by:  Salvador Mendoza MD 10/6/2021

## 2021-11-05 ENCOUNTER — OFFICE VISIT (OUTPATIENT)
Dept: FAMILY MEDICINE CLINIC | Age: 41
End: 2021-11-05
Payer: COMMERCIAL

## 2021-11-05 VITALS
WEIGHT: 292.6 LBS | HEIGHT: 67 IN | TEMPERATURE: 97.3 F | BODY MASS INDEX: 45.92 KG/M2 | DIASTOLIC BLOOD PRESSURE: 78 MMHG | HEART RATE: 92 BPM | SYSTOLIC BLOOD PRESSURE: 138 MMHG

## 2021-11-05 DIAGNOSIS — Z79.4 TYPE 2 DIABETES MELLITUS WITHOUT COMPLICATION, WITH LONG-TERM CURRENT USE OF INSULIN (HCC): Primary | ICD-10-CM

## 2021-11-05 DIAGNOSIS — E11.9 TYPE 2 DIABETES MELLITUS WITHOUT COMPLICATION, WITH LONG-TERM CURRENT USE OF INSULIN (HCC): Primary | ICD-10-CM

## 2021-11-05 DIAGNOSIS — N52.9 ERECTILE DYSFUNCTION, UNSPECIFIED ERECTILE DYSFUNCTION TYPE: ICD-10-CM

## 2021-11-05 PROCEDURE — 3046F HEMOGLOBIN A1C LEVEL >9.0%: CPT | Performed by: STUDENT IN AN ORGANIZED HEALTH CARE EDUCATION/TRAINING PROGRAM

## 2021-11-05 PROCEDURE — 2022F DILAT RTA XM EVC RTNOPTHY: CPT | Performed by: STUDENT IN AN ORGANIZED HEALTH CARE EDUCATION/TRAINING PROGRAM

## 2021-11-05 PROCEDURE — G8417 CALC BMI ABV UP PARAM F/U: HCPCS | Performed by: STUDENT IN AN ORGANIZED HEALTH CARE EDUCATION/TRAINING PROGRAM

## 2021-11-05 PROCEDURE — 1036F TOBACCO NON-USER: CPT | Performed by: STUDENT IN AN ORGANIZED HEALTH CARE EDUCATION/TRAINING PROGRAM

## 2021-11-05 PROCEDURE — 99211 OFF/OP EST MAY X REQ PHY/QHP: CPT | Performed by: STUDENT IN AN ORGANIZED HEALTH CARE EDUCATION/TRAINING PROGRAM

## 2021-11-05 PROCEDURE — 99213 OFFICE O/P EST LOW 20 MIN: CPT | Performed by: STUDENT IN AN ORGANIZED HEALTH CARE EDUCATION/TRAINING PROGRAM

## 2021-11-05 PROCEDURE — G8484 FLU IMMUNIZE NO ADMIN: HCPCS | Performed by: STUDENT IN AN ORGANIZED HEALTH CARE EDUCATION/TRAINING PROGRAM

## 2021-11-05 PROCEDURE — G8427 DOCREV CUR MEDS BY ELIG CLIN: HCPCS | Performed by: STUDENT IN AN ORGANIZED HEALTH CARE EDUCATION/TRAINING PROGRAM

## 2021-11-05 RX ORDER — INSULIN GLARGINE 100 [IU]/ML
INJECTION, SOLUTION SUBCUTANEOUS
Qty: 5 PEN | Refills: 3 | Status: SHIPPED | OUTPATIENT
Start: 2021-11-05 | End: 2021-12-21

## 2021-11-05 RX ORDER — TADALAFIL 10 MG/1
10 TABLET ORAL DAILY PRN
Qty: 9 TABLET | Refills: 0 | Status: SHIPPED | OUTPATIENT
Start: 2021-11-05 | End: 2021-12-21 | Stop reason: SDUPTHER

## 2021-11-05 NOTE — PROGRESS NOTES
Subjective:    Juliann Bruno is a 39 y.o. male with  has a past medical history of Diabetes mellitus ([address] Erectile dysfunction, Hypertension, Morbid obesity ([address] and Type 2 diabetes mellitus without complication ([address] Presented to the office today for:  Chief Complaint   Patient presents with    Diabetes    Hypertension    Health Maintenance     patient declined A1c waiver, declined vaccines        HPI    39year old male here today to follow up for DM2. Doing well. No acute concerns. DM2 - Last A1c 10.2 Will recheck next visit. Stated only occasionaly checks his BS, last one in the , stated usually around that range. Stated he is complaint with lantus 70U daily. Has not missed his insulin. Will increase insulin to 74U at this time. Denied any hypoglycemic events. Erectile dysfunction - patient is also complaint of erectile dysfunction for the past few weeks. No recent medication changes. Stated occasionally gets morning erections. Is not able to maintain erection during sexual activity. Would like to try cialis. Counseled that he will need better DM control as it may be likely contributing to his ED. Review of Systems   Constitutional: Negative for chills and fever. Eyes: Negative for visual disturbance. Respiratory: Negative for chest tightness and shortness of breath. Cardiovascular: Negative for chest pain and leg swelling. Gastrointestinal: Negative for abdominal pain, constipation, diarrhea, nausea and vomiting. Genitourinary: Negative for difficulty urinating. Neurological: Negative for headaches.      The patient has a   Family History   Problem Relation Age of Onset    Diabetes Mother     High Cholesterol Mother     High Blood Pressure Mother     Heart Disease Mother        Objective:    /78 (Site: Right Upper Arm, Position: Sitting, Cuff Size: Large Adult) Comment: manual  Pulse 92   Temp 97.3 °F (36.3 °C) (Temporal)   Ht 5' 7\" (1.702 m)   Wt 292 lb 9.6 oz (132.7 kg)   BMI 45.83 kg/m²    BP Readings from Last 3 Encounters:   11/05/21 138/78   08/18/21 (!) 147/99   08/17/21 (!) 144/98       Physical Exam  Vitals reviewed. Constitutional:       Appearance: He is well-developed. He is obese. HENT:      Head: Normocephalic and atraumatic. Eyes:      Pupils: Pupils are equal, round, and reactive to light. Cardiovascular:      Rate and Rhythm: Normal rate and regular rhythm. Heart sounds: Normal heart sounds. Pulmonary:      Effort: Pulmonary effort is normal. No respiratory distress. Breath sounds: Normal breath sounds. No wheezing or rales. Abdominal:      General: Bowel sounds are normal.      Palpations: Abdomen is soft. Tenderness: There is no abdominal tenderness. Skin:     General: Skin is warm and dry. Neurological:      Mental Status: He is alert and oriented to person, place, and time. Lab Results   Component Value Date    WBC 9.0 04/07/2019    HGB 16.8 04/07/2019    HCT 49.2 04/07/2019     04/07/2019    CHOL 212 (H) 05/20/2021    TRIG 154 (H) 05/20/2021    HDL 50 05/20/2021    ALT 98 (H) 04/07/2019    AST 71 (H) 04/07/2019     05/20/2021    K 4.0 05/20/2021    CL 95 (L) 05/20/2021    CREATININE 0.83 05/20/2021    BUN 9 05/20/2021    CO2 25 05/20/2021    TSH 1.04 02/21/2013    LABA1C 10.6 08/17/2021    LABMICR 150 (H) 05/20/2021     Lab Results   Component Value Date    CALCIUM 10.1 05/20/2021     Lab Results   Component Value Date    LDLCHOLESTEROL 131 (H) 05/20/2021       Assessment and Plan:    1. Type 2 diabetes mellitus without complication, with long-term current use of insulin ([facility])  - A1c 10.2, recheck next visit  - INCREASE lantus to 74U daily  - insulin glargine (LANTUS SOLOSTAR) 100 UNIT/ML injection pen; Take 74 units nightly  Dispense: 5 pen; Refill: 3    2. Erectile dysfunction, unspecified erectile dysfunction type  - tadalafil (CIALIS) 10 MG tablet;  Take 1 tablet by mouth daily as needed for Erectile Dysfunction  Dispense: 9 tablet; Refill: 0          Requested Prescriptions     Signed Prescriptions Disp Refills    tadalafil (CIALIS) 10 MG tablet 9 tablet 0     Sig: Take 1 tablet by mouth daily as needed for Erectile Dysfunction    insulin glargine (LANTUS SOLOSTAR) 100 UNIT/ML injection pen 5 pen 3     Sig: Take 74 units nightly       Medications Discontinued During This Encounter   Medication Reason    insulin glargine (LANTUS SOLOSTAR) 100 UNIT/ML injection pen        Ryder received counseling on the following healthy behaviors: nutrition, exercise and medication adherence    Discussed use,benefit, and side effects of prescribed medications. Barriers to medication compliance addressed. All patient questions answered. Pt voiced understanding. Return in about 6 weeks (around 12/17/2021) for Diabetes. Disclaimer: Some orall of this note was transcribed using voice-recognition software. This may cause typographical errors occasionally. Although all effort is made to fix these errors, please do not hesitate to contact our office if there Alyne Boom concern with the understanding of this note.

## 2021-11-05 NOTE — PROGRESS NOTES
DIABETES and HYPERTENSION visit    BP Readings from Last 3 Encounters:   08/18/21 (!) 147/99   08/17/21 (!) 144/98   05/20/21 138/86        Hemoglobin A1C (%)   Date Value   08/17/2021 10.6   05/20/2021 11.3   10/02/2020 10.9     Microalb/Crt. Ratio (mcg/mg creat)   Date Value   05/20/2021 150 (H)     LDL Cholesterol (mg/dL)   Date Value   05/20/2021 131 (H)     HDL (mg/dL)   Date Value   05/20/2021 50     BUN (mg/dL)   Date Value   05/20/2021 9     CREATININE (mg/dL)   Date Value   05/20/2021 0.83     Glucose (mg/dL)   Date Value   05/20/2021 388 (H)            Have you changed or started any medications since your last visit including any over-the-counter medicines, vitamins, or herbal medicines? no   Have you stopped taking any of your medications? Is so, why? -  no  Are you having any side effects from any of your medications? - no    Have you seen any other physician or provider since your last visit?  no   Have you had any other diagnostic tests since your last visit?  no   Have you been seen in the emergency room and/or had an admission in a hospital since we last saw you?  no   Have you had your routine dental cleaning in the past 6 months?  no     Have you had your annual diabetic retinal (eye) exam? Yes   (ensure copy of exam is in the chart)    Do you have an active MyChart account? If no, what is the barrier?   Yes    Patient Care Team:  Lord Jin MD as PCP - General (Family Medicine)  Sharif Garcia DO as Consulting Physician (General Surgery)    Medical History Review  Past Medical, Family, and Social History reviewed and does not contribute to the patient presenting condition    Health Maintenance   Topic Date Due    Hepatitis C screen  Never done    Varicella vaccine (1 of 2 - 2-dose childhood series) Never done    COVID-19 Vaccine (1) Never done    Hepatitis B vaccine (1 of 3 - Risk 3-dose series) Never done    Flu vaccine (1) 09/01/2021    A1C test (Diabetic or Prediabetic) 11/17/2021    Diabetic retinal exam  04/13/2022    Diabetic microalbuminuria test  05/20/2022    Lipid screen  05/20/2022    Potassium monitoring  05/20/2022    Creatinine monitoring  05/20/2022    Diabetic foot exam  08/17/2022    DTaP/Tdap/Td vaccine (2 - Td or Tdap) 08/15/2027    Pneumococcal 0-64 years Vaccine (2 of 2 - PPSV23) 03/08/2045    HIV screen  Completed    Hepatitis A vaccine  Aged Out    Hib vaccine  Aged Out    Meningococcal (ACWY) vaccine  Aged Out

## 2021-11-05 NOTE — PROGRESS NOTES
Attending Physician Statement  I have discussed the care of Avelina Daily 39 y.o. male, including pertinent history and exam findings, with the resident Dr. Jayda Dotson MD.    History and Exam:   Chief Complaint   Patient presents with    Diabetes    Hypertension    Health Maintenance     patient declined A1c waiver, declined vaccines        Past Medical History:   Diagnosis Date    Diabetes mellitus ([facility]     Erectile dysfunction 8/15/2017    Hypertension     Morbid obesity ([facility] 4/28/2016    Type 2 diabetes mellitus without complication ([facility] 2/78/3555     No Known Allergies   I have seen and examined the patient and the key elements of the encounter have been performed by me. BP Readings from Last 3 Encounters:   11/05/21 138/78   08/18/21 (!) 147/99   08/17/21 (!) 144/98     /78 (Site: Right Upper Arm, Position: Sitting, Cuff Size: Large Adult) Comment: manual  Pulse 92   Temp 97.3 °F (36.3 °C) (Temporal)   Ht 5' 7\" (1.702 m)   Wt 292 lb 9.6 oz (132.7 kg)   BMI 45.83 kg/m²   Lab Results   Component Value Date    WBC 9.0 04/07/2019    HGB 16.8 04/07/2019    HCT 49.2 04/07/2019     04/07/2019    CHOL 212 (H) 05/20/2021    TRIG 154 (H) 05/20/2021    HDL 50 05/20/2021    ALT 98 (H) 04/07/2019    AST 71 (H) 04/07/2019     05/20/2021    K 4.0 05/20/2021    CL 95 (L) 05/20/2021    CREATININE 0.83 05/20/2021    BUN 9 05/20/2021    CO2 25 05/20/2021    TSH 1.04 02/21/2013    LABA1C 10.6 08/17/2021    LABMICR 150 (H) 05/20/2021     Lab Results   Component Value Date    LABALBU 4.2 04/07/2019     No results found for: IRON, TIBC, FERRITIN  Lab Results   Component Value Date    LDLCHOLESTEROL 131 (H) 05/20/2021     I agree with the assessment, plan and the diagnosis of    Diagnosis Orders   1. Type 2 diabetes mellitus without complication, with long-term current use of insulin ([facility])  insulin glargine (LANTUS SOLOSTAR) 100 UNIT/ML injection pen   2.  Erectile dysfunction, unspecified erectile dysfunction type  tadalafil (CIALIS) 10 MG tablet    . I agree with orders as documented by the resident. More than 25 minutes spent  in face to face encounter with the patient and more than half in counseling. Patient's questions were answered. Patient Voiced understanding to the counseling. Return in about 6 weeks (around 12/17/2021) for Diabetes.    (GC Modifier)-Dr. Jose Alberto Rice MD

## 2021-11-07 ASSESSMENT — ENCOUNTER SYMPTOMS
VOMITING: 0
SHORTNESS OF BREATH: 0
CHEST TIGHTNESS: 0
CONSTIPATION: 0
NAUSEA: 0
ABDOMINAL PAIN: 0
DIARRHEA: 0

## 2021-12-07 ENCOUNTER — HOSPITAL ENCOUNTER (EMERGENCY)
Age: 41
Discharge: HOME | End: 2021-12-08
Attending: EMERGENCY MEDICINE
Payer: COMMERCIAL

## 2021-12-07 DIAGNOSIS — U07.1 COVID-19: Primary | ICD-10-CM

## 2021-12-07 PROCEDURE — 99282 EMERGENCY DEPT VISIT SF MDM: CPT

## 2021-12-08 VITALS
BODY MASS INDEX: 45.2 KG/M2 | RESPIRATION RATE: 16 BRPM | SYSTOLIC BLOOD PRESSURE: 156 MMHG | HEART RATE: 116 BPM | HEIGHT: 67 IN | TEMPERATURE: 98.2 F | WEIGHT: 288 LBS | DIASTOLIC BLOOD PRESSURE: 96 MMHG | OXYGEN SATURATION: 94 %

## 2021-12-08 NOTE — ED PROVIDER NOTES
EMERGENCY DEPARTMENT ENCOUNTER    Pt Name: Juliann Bruno  MRN: [id number]  [place] 1980  Date of evaluation: 12/8/21  CHIEF COMPLAINT       Chief Complaint   Patient presents with    Positive For Covid-19     tested pos for covid on sunday     HISTORY OF PRESENT ILLNESS   Patient is a 80-year-old male who presents to the ED for evaluation of nonproductive cough. He tested positive for Covid 4 days ago. No other issues at this time. ROS:  No fevers, hortness of breath, chest pain, abdominal pain, nausea, vomiting, changes in urine or stool. REVIEW OF SYSTEMS     Review of Systems   All other systems reviewed and are negative. PASTMEDICAL HISTORY     Past Medical History:   Diagnosis Date    Diabetes mellitus ([facility]     Erectile dysfunction 8/15/2017    Hypertension     Morbid obesity ([facility] 4/28/2016    Type 2 diabetes mellitus without complication ([facility] 5/81/3080     SURGICAL HISTORY       Past Surgical History:   Procedure Laterality Date    PRE-MALIGNANT / BENIGN SKIN LESION EXCISION Right 08/22/2017    rt scalp lesion removed    TONSILLECTOMY       CURRENT MEDICATIONS       Previous Medications    ALCOHOL SWABS (ALCOHOL PREP) 70 % PADS    1 each by Does not apply route 3 times daily    ALUM SULFATE-CA ACETATE (DOMEBORO) PACK    Apply 1 packet topically daily Dissolve 1 packet into water and soak feet daily    ATORVASTATIN (LIPITOR) 40 MG TABLET    Take 1 tablet by mouth daily    B-D UF III MINI PEN NEEDLES 31G X 5 MM MISC    USE ONCE DAILY    BLOOD PRESSURE KIT    1 actuation by Does not apply route daily    COLLOIDAL OATMEAL (AVEENO ECZEMA THERAPY) 1 % CREA    Apply 1 Tube topically as needed (use as needed)    GABAPENTIN (NEURONTIN) 100 MG CAPSULE    Take 3 capsules by mouth 3 times daily for 30 days.  Intended supply: 30 days    GLUCOSE BLOOD (BLOOD GLUCOSE TEST STRIPS) STRP    1 each by In Vitro route 3 times daily    GLUCOSE MONITORING KIT (FREESTYLE) MONITORING KIT    Check blood sugar three times daily    INSULIN GLARGINE (LANTUS SOLOSTAR) 100 UNIT/ML INJECTION PEN    Take 74 units nightly    INSULIN PEN NEEDLE (B-D UF III MINI PEN NEEDLES) 31G X 5 MM MISC    USE ONCE DAILY    LISINOPRIL-HYDROCHLOROTHIAZIDE (PRINZIDE;ZESTORETIC) 20-25 MG PER TABLET    Take 1 tablet by mouth daily    NYSTATIN (MYCOSTATIN) 563230 UNIT/GM POWDER    Apply topically 4 times daily. SITAGLIPTIN (JANUVIA) 100 MG TABLET    take 1 tablet by mouth once daily    SOFT TOUCH LANCETS MISC    1 each by Does not apply route 3 times daily    TADALAFIL (CIALIS) 10 MG TABLET    Take 1 tablet by mouth daily as needed for Erectile Dysfunction    TIZANIDINE (ZANAFLEX) 2 MG TABLET    Take 1 tablet by mouth every 8 hours as needed (back pain)     ALLERGIES     has No Known Allergies. FAMILY HISTORY     He indicated that his mother is alive. He indicated that his father is . SOCIAL HISTORY       Social History     Tobacco Use    Smoking status: Never Smoker    Smokeless tobacco: Never Used   Substance Use Topics    Alcohol use: No    Drug use: No     PHYSICAL EXAM     INITIAL VITALS: BP (!) 156/96   Pulse 116   Temp 98.2 °F (36.8 °C) (Oral)   Resp 16   Ht 5' 7\" (1.702 m)   Wt 288 lb (130.6 kg)   SpO2 93%   BMI 45.11 kg/m²    Physical Exam  HENT:      Head: Normocephalic. Right Ear: External ear normal.      Left Ear: External ear normal.      Nose: Nose normal.   Eyes:      Conjunctiva/sclera: Conjunctivae normal.   Cardiovascular:      Rate and Rhythm: Normal rate. Pulmonary:      Effort: Pulmonary effort is normal.   Abdominal:      General: Abdomen is flat. Skin:     General: Skin is dry. Neurological:      Mental Status: He is alert. Mental status is at baseline. Psychiatric:         Mood and Affect: Mood normal.         Behavior: Behavior normal.         MEDICAL DECISION MAKING:   The patient is hemodynamically stable, afebrile, nontoxic-appearing. Physical exam unremarkable.   Based on history and exam likely progression of COVID-19 symptoms. Patient does not have oxygen requirement. No indications for hospitalization. ED plan for reassurance, discharge. DIAGNOSTIC RESULTS   EKG:All EKG's are interpreted by the Emergency Department Physician who either signs or Co-signs this chart in the absence of a cardiologist.        RADIOLOGY:All plain film, CT, MRI, and formal ultrasound images (except ED bedside ultrasound) are read by the radiologist, see reports below, unless otherwisenoted in MDM or here. No orders to display     LABS: All lab results were reviewed by myself, and all abnormals are listed below. Labs Reviewed - No data to display    EMERGENCY DEPARTMENTCOURSE:         Vitals:    Vitals:    12/07/21 2234   BP: (!) 156/96   Pulse: 116   Resp: 16   Temp: 98.2 °F (36.8 °C)   TempSrc: Oral   SpO2: 93%   Weight: 288 lb (130.6 kg)   Height: 5' 7\" (1.702 m)       The patient was given the following medications while in the emergency department:  No orders of the defined types were placed in this encounter.     CONSULTS:  None    FINAL IMPRESSION      1. COVID-19          DISPOSITION/PLAN   DISPOSITION Decision To Discharge 12/08/2021 12:04:34 AM      PATIENT REFERRED TO:  Rodriguez Rogers MD  [facility] [phone]    In 2 days      DISCHARGE MEDICATIONS:  New Prescriptions    No medications on file     Meng Fishman MD  Attending Emergency Physician                   Feng Whitman MD  12/08/21 0844

## 2021-12-09 ENCOUNTER — CARE COORDINATION (OUTPATIENT)
Dept: CARE COORDINATION | Age: 41
End: 2021-12-09

## 2021-12-09 NOTE — CARE COORDINATION
First attempt to reach pt for covid risk education s/p er visit left vm to call [facility] [phone]

## 2021-12-10 NOTE — CARE COORDINATION
Patient contacted regarding COVID-19 diagnosis. Discussed COVID-19 related testing which was available at this time. Test results were positive. Patient informed of results, if available? Yes. Ambulatory Care Manager contacted the patient by telephone to perform post discharge assessment. Call within 2 business days of discharge: Yes. Verified name and  with patient as identifiers. Provided introduction to self, and explanation of the CTN/ACM role, and reason for call due to risk factors for infection and/or exposure to COVID-19. Symptoms reviewed with patient who verbalized the following symptoms: no worsening symptoms. Due to no new or worsening symptoms encounter was not routed to provider for escalation. Discussed follow-up appointments. If no appointment was previously scheduled, appointment scheduling offered: Yes. [facility] follow up appointment(s):   Future Appointments   Date Time Provider Liana Davila   2021  2:15 PM Leo Lindsay MD [employer] [place] [id number] Danelle Mari follow up appointment(s): will call     Non-face-to-face services provided:  Reviewed and followed up on pending diagnostic tests and treatments-covid     Advance Care Planning:   Does patient have an Advance Directive:  reviewed and current. Educated patient about risk for severe COVID-19 due to risk factors according to CDC guidelines. ACM reviewed discharge instructions, medical action plan and red flag symptoms with the patient who verbalized understanding. Discussed COVID vaccination status: Yes. Education provided on COVID-19 vaccination as appropriate. Discussed exposure protocols and quarantine with CDC Guidelines. Patient was given an opportunity to verbalize any questions and concerns and agrees to contact ACM or health care provider for questions related to their healthcare.     Reviewed and educated patient on any new and changed medications related to discharge diagnosis     Was patient discharged with a pulse oximeter? No Discussed and confirmed pulse oximeter discharge instructions and when to notify provider or seek emergency care. ACM provided contact information. Plan for follow-up call in 5-7 days based on severity of symptoms and risk factors.  Spoke with pt who said he still has a cough and headache he will call his pcp for follow up he will get vaccinated in early march

## 2021-12-13 ENCOUNTER — TELEPHONE (OUTPATIENT)
Dept: FAMILY MEDICINE CLINIC | Age: 41
End: 2021-12-13

## 2021-12-13 NOTE — TELEPHONE ENCOUNTER
Patient called in asking can you order him some tessalon  perles for his cough patient was tested positive for covid 12-3-21, sent it to his [facility]. patient has appointment with Dr. Mulugeta Barahona on 12-21.

## 2021-12-17 ENCOUNTER — CARE COORDINATION (OUTPATIENT)
Dept: CARE COORDINATION | Age: 41
End: 2021-12-17

## 2021-12-17 RX ORDER — BENZONATATE 100 MG/1
100 CAPSULE ORAL 2 TIMES DAILY PRN
Qty: 20 CAPSULE | Refills: 0 | Status: SHIPPED | OUTPATIENT
Start: 2021-12-17 | End: 2022-02-22 | Stop reason: SDUPTHER

## 2021-12-17 NOTE — CARE COORDINATION
Patient contacted regarding COVID-19 diagnosis. Discussed COVID-19 related testing which was available at this time. Test results were positive. Patient informed of results, if available? Yes    Ambulatory Care Manager contacted the patient by telephone to perform follow-up assessment. Verified name and  with patient as identifiers. Patient has following risk factors of: no known risk factors. Symptoms reviewed with patient who verbalized the following symptoms: no worsening symptoms. Due to no new or worsening symptoms encounter was not routed to provider for escalation. Educated patient about risk for severe COVID-19 due to risk factors according to CDC guidelines. ACM reviewed discharge instructions, medical action plan and red flag symptoms with the patient who verbalized understanding. Discussed COVID vaccination status: Yes. Education provided on COVID-19 vaccination as appropriate. Discussed exposure protocols and quarantine with CDC Guidelines. Patient was given an opportunity to verbalize any questions and concerns and agrees to contact ACM or health care provider for questions related to their healthcare. Was patient discharged with a pulse oximeter? No Discussed and confirmed pulse oximeter discharge instructions and when to notify provider or seek emergency care. ACM provided contact information. Plan for follow-up call in 5-7 days based on severity of symptoms and risk factors. Spoke with pt who said he is doing better from his [place] he does still have a residual cough.  He did make an apt to see his pcp for next Tuesday the .

## 2021-12-21 ENCOUNTER — OFFICE VISIT (OUTPATIENT)
Dept: FAMILY MEDICINE CLINIC | Age: 41
End: 2021-12-21
Payer: COMMERCIAL

## 2021-12-21 VITALS
SYSTOLIC BLOOD PRESSURE: 158 MMHG | BODY MASS INDEX: 44.98 KG/M2 | HEIGHT: 67 IN | HEART RATE: 102 BPM | DIASTOLIC BLOOD PRESSURE: 88 MMHG | WEIGHT: 286.6 LBS | OXYGEN SATURATION: 93 % | TEMPERATURE: 98.1 F

## 2021-12-21 DIAGNOSIS — Z79.4 TYPE 2 DIABETES MELLITUS WITHOUT COMPLICATION, WITH LONG-TERM CURRENT USE OF INSULIN (HCC): Primary | ICD-10-CM

## 2021-12-21 DIAGNOSIS — N52.9 ERECTILE DYSFUNCTION, UNSPECIFIED ERECTILE DYSFUNCTION TYPE: ICD-10-CM

## 2021-12-21 DIAGNOSIS — I10 ESSENTIAL HYPERTENSION: ICD-10-CM

## 2021-12-21 DIAGNOSIS — E11.9 TYPE 2 DIABETES MELLITUS WITHOUT COMPLICATION, WITH LONG-TERM CURRENT USE OF INSULIN (HCC): Primary | ICD-10-CM

## 2021-12-21 LAB — HBA1C MFR BLD: 11.6 %

## 2021-12-21 PROCEDURE — 90746 HEPB VACCINE 3 DOSE ADULT IM: CPT | Performed by: STUDENT IN AN ORGANIZED HEALTH CARE EDUCATION/TRAINING PROGRAM

## 2021-12-21 PROCEDURE — 99213 OFFICE O/P EST LOW 20 MIN: CPT | Performed by: STUDENT IN AN ORGANIZED HEALTH CARE EDUCATION/TRAINING PROGRAM

## 2021-12-21 PROCEDURE — 99211 OFF/OP EST MAY X REQ PHY/QHP: CPT | Performed by: FAMILY MEDICINE

## 2021-12-21 PROCEDURE — 83036 HEMOGLOBIN GLYCOSYLATED A1C: CPT | Performed by: STUDENT IN AN ORGANIZED HEALTH CARE EDUCATION/TRAINING PROGRAM

## 2021-12-21 PROCEDURE — 90686 IIV4 VACC NO PRSV 0.5 ML IM: CPT | Performed by: STUDENT IN AN ORGANIZED HEALTH CARE EDUCATION/TRAINING PROGRAM

## 2021-12-21 RX ORDER — TADALAFIL 10 MG/1
10 TABLET ORAL DAILY PRN
Qty: 9 TABLET | Refills: 0 | Status: SHIPPED | OUTPATIENT
Start: 2021-12-21 | End: 2022-02-22 | Stop reason: SDUPTHER

## 2021-12-21 RX ORDER — LISINOPRIL AND HYDROCHLOROTHIAZIDE 20; 12.5 MG/1; MG/1
2 TABLET ORAL DAILY
Qty: 60 TABLET | Refills: 0 | Status: SHIPPED | OUTPATIENT
Start: 2021-12-21 | End: 2022-02-22 | Stop reason: SDUPTHER

## 2021-12-21 RX ORDER — INSULIN GLARGINE 100 [IU]/ML
INJECTION, SOLUTION SUBCUTANEOUS
Qty: 5 PEN | Refills: 3 | Status: SHIPPED | OUTPATIENT
Start: 2021-12-21 | End: 2022-02-21

## 2021-12-21 ASSESSMENT — ENCOUNTER SYMPTOMS
SHORTNESS OF BREATH: 0
CHEST TIGHTNESS: 0
DIARRHEA: 0
CONSTIPATION: 0
VOMITING: 0
ABDOMINAL PAIN: 0
NAUSEA: 0

## 2021-12-21 NOTE — PROGRESS NOTES
I have reviewed and discussed key elements of [address] with the resident including plan of care and follow up and agree with the care mitch plan. POC A1C today > 10. Increase Lantus today. Close follow up in one month. Recent COVID infection. Diagnosis Orders   1. Type 2 diabetes mellitus without complication, with long-term current use of insulin ([facility])  POCT glycosylated hemoglobin (Hb A1C)    insulin glargine (LANTUS SOLOSTAR) 100 UNIT/ML injection pen   2. Erectile dysfunction, unspecified erectile dysfunction type  tadalafil (CIALIS) 10 MG tablet   3.  Essential hypertension  lisinopril-hydroCHLOROthiazide (PRINZIDE;ZESTORETIC) 20-12.5 MG per tablet

## 2021-12-21 NOTE — PROGRESS NOTES
Diabetic visit information    BP Readings from Last 3 Encounters:   12/21/21 (!) 170/96   12/07/21 (!) 156/96   11/05/21 138/78       Hemoglobin A1C (%)   Date Value   08/17/2021 10.6   05/20/2021 11.3   10/02/2020 10.9     Microalb/Crt. Ratio (mcg/mg creat)   Date Value   05/20/2021 150 (H)     LDL Cholesterol (mg/dL)   Date Value   05/20/2021 131 (H)               Have you changed or started any medications since your last visit including any over-the-counter medicines, vitamins, or herbal medicines? no   Have you stopped taking any of your medications? Is so, why? -  yes - see list  Are you having any side effects from any of your medications? - no    Have you seen any other physician or provider since your last visit?  no   Have you had any other diagnostic tests since your last visit?  no   Have you been seen in the emergency room and/or had an admission in a hospital since we last saw you?  yes - [facility]  12/7/21    Have you had your annual diabetic retinal (eye) exam? yes  (ensure copy of exam is in the chart)    Have you had your routine dental cleaning in the past 6 months? no    Do you have an active MyChart account? If not, what are your barriers? Yes    Patient Care Team:  Lon Nagy MD as PCP - General (Family Medicine)  Eladio Marinelli DO as Consulting Physician (General Surgery)  Evi Calderon, RN as Ambulatory Care Manager    Medical history Review  Past Medical, Family, and Social History reviewed and does not contribute to the patient presenting condition.     Health Maintenance   Topic Date Due    Hepatitis C screen  Never done    Varicella vaccine (1 of 2 - 2-dose childhood series) Never done    COVID-19 Vaccine (1) Never done    Hepatitis B vaccine (1 of 3 - Risk 3-dose series) Never done    Flu vaccine (1) 09/01/2021    A1C test (Diabetic or Prediabetic)  11/17/2021    Diabetic retinal exam  04/13/2022    Diabetic microalbuminuria test  05/20/2022    Lipid screen  05/20/2022

## 2021-12-21 NOTE — PROGRESS NOTES
Subjective:    Razia Harris is a 39 y.o. male with  has a past medical history of Diabetes mellitus ([facility] [address] Erectile dysfunction, Hypertension, Morbid obesity ([facility] [address] and Type 2 diabetes mellitus without complication ([facility] [address] Presented to the office today for:  Chief Complaint   Patient presents with    Diabetes    Health Maintenance     declined vaccines        HPI    39year old male here today to follow up for DM2. Doing well no acute concerns. Recent covid diagnosis , doing well now, has lingering cough but otherwise doing better. No SOB, chest pain. Not vaccinated    /96 -> 158/88 - lisinoprilHCTZ 20-25. Compliant with meds. Asymptomatic. Will INCREASE lisinopril to 40. Follow up in 1 month. DM2 - lantus 74U - 100's in the AM. A1c 11.6 today increased from previous. Stated may have missed 1-2 doses of lantus over the past month. Review of Systems   Constitutional: Negative for chills and fever. Eyes: Negative for visual disturbance. Respiratory: Negative for chest tightness and shortness of breath. Cardiovascular: Negative for chest pain and leg swelling. Gastrointestinal: Negative for abdominal pain, constipation, diarrhea, nausea and vomiting. Genitourinary: Negative for difficulty urinating. Neurological: Negative for headaches. The patient has a   Family History   Problem Relation Age of Onset    Diabetes Mother     High Cholesterol Mother     High Blood Pressure Mother     Heart Disease Mother        Objective:    BP (!) 158/88 (Site: Right Upper Arm, Position: Sitting, Cuff Size: Large Adult) Comment: manual  Pulse 102   Temp 98.1 °F (36.7 °C) (Temporal)   Ht 5' 7\" (1.702 m)   Wt 286 lb 9.6 oz (130 kg)   SpO2 93%   BMI 44.89 kg/m²    BP Readings from Last 3 Encounters:   12/21/21 (!) 158/88   12/07/21 (!) 156/96   11/05/21 138/78       Physical Exam  Constitutional:       Appearance: He is well-developed. He is obese.    HENT:      Head: Normocephalic and atraumatic. Eyes:      Pupils: Pupils are equal, round, and reactive to light. Cardiovascular:      Rate and Rhythm: Normal rate and regular rhythm. Heart sounds: Normal heart sounds. Pulmonary:      Effort: Pulmonary effort is normal. No respiratory distress. Breath sounds: Normal breath sounds. No wheezing or rales. Abdominal:      General: Bowel sounds are normal.      Palpations: Abdomen is soft. Tenderness: There is no abdominal tenderness. Skin:     General: Skin is warm and dry. Neurological:      Mental Status: He is alert and oriented to person, place, and time. Lab Results   Component Value Date    WBC 9.0 04/07/2019    HGB 16.8 04/07/2019    HCT 49.2 04/07/2019     04/07/2019    CHOL 212 (H) 05/20/2021    TRIG 154 (H) 05/20/2021    HDL 50 05/20/2021    ALT 98 (H) 04/07/2019    AST 71 (H) 04/07/2019     05/20/2021    K 4.0 05/20/2021    CL 95 (L) 05/20/2021    CREATININE 0.83 05/20/2021    BUN 9 05/20/2021    CO2 25 05/20/2021    TSH 1.04 02/21/2013    LABA1C 11.6 12/21/2021    LABMICR 150 (H) 05/20/2021     Lab Results   Component Value Date    CALCIUM 10.1 05/20/2021     Lab Results   Component Value Date    LDLCHOLESTEROL 131 (H) 05/20/2021       Assessment and Plan:    1. Type 2 diabetes mellitus without complication, with long-term current use of insulin (HCC)  - Increase lantus to 80U nightly  - patient to keep log of BS for the next month and bring next visit  - POCT glycosylated hemoglobin (Hb A1C) 11.6  - insulin glargine (LANTUS SOLOSTAR) 100 UNIT/ML injection pen; Take 80 units nightly  Dispense: 5 pen; Refill: 3    2. Erectile dysfunction, unspecified erectile dysfunction type  - stable  - tadalafil (CIALIS) 10 MG tablet; Take 1 tablet by mouth daily as needed for Erectile Dysfunction  Dispense: 9 tablet; Refill: 0    3.  Essential hypertension  - /88 on recheck  - asymptomatic  - increase LISINOPRIL to 40 mg PO daily  - follow up in 1 month  - lisinopril-hydroCHLOROthiazide (PRINZIDE;ZESTORETIC) 20-12.5 MG per tablet; Take 2 tablets by mouth daily  Dispense: 60 tablet; Refill: 0          Requested Prescriptions     Signed Prescriptions Disp Refills    tadalafil (CIALIS) 10 MG tablet 9 tablet 0     Sig: Take 1 tablet by mouth daily as needed for Erectile Dysfunction    insulin glargine (LANTUS SOLOSTAR) 100 UNIT/ML injection pen 5 pen 3     Sig: Take 80 units nightly    lisinopril-hydroCHLOROthiazide (PRINZIDE;ZESTORETIC) 20-12.5 MG per tablet 60 tablet 0     Sig: Take 2 tablets by mouth daily       Medications Discontinued During This Encounter   Medication Reason    tadalafil (CIALIS) 10 MG tablet REORDER    insulin glargine (LANTUS SOLOSTAR) 100 UNIT/ML injection pen     lisinopril-hydroCHLOROthiazide (PRINZIDE;ZESTORETIC) 20-25 MG per tablet DOSE ADJUSTMENT       Ryder received counseling on the following healthy behaviors: nutrition, exercise and medication adherence    Discussed use,benefit, and side effects of prescribed medications. Barriers to medication compliance addressed. All patient questions answered. Pt voiced understanding. Return in about 4 weeks (around 1/18/2022) for Hypertension. Disclaimer: Some orall of this note was transcribed using voice-recognition software. This may cause typographical errors occasionally. Although all effort is made to fix these errors, please do not hesitate to contact our office if there Grisel Robles concern with the understanding of this note.

## 2021-12-21 NOTE — PATIENT INSTRUCTIONS
Thank you for letting us take care of you today. We hope all your questions were addressed. If a question was overlooked or something else comes to mind after you return home, please contact a member of your Care Team listed below. Your Care Team at [place] [address] is Team #5  Virgil Lieberman MD (Faculty)  Jolie Diaz MD (Resident)  Doug Soto MD (Resident)  Domi Sorto MD (Resident)  Marta Sepulveda MD (Resident)  Ivan Hernández., [address] JESICA Mc., Mireya Marquez., Prateek [facility] office)  Nilegalina Estrella, [address] (Clinical Practice Manager)  [facility], [address] [place] [address] (Clinical Pharmacist)       Office phone number: [phone]    If you need to get in right away due to illness, please be advised we have \"Same Day\" appointments available Monday-Friday. Please call us at [phone] option #3 to schedule your \"Same Day\" appointment. Patient Education        Influenza (Flu) Vaccine (Inactivated or Recombinant): What You Need to Know  Why get vaccinated? Influenza vaccine can prevent influenza (flu). Flu is a contagious disease that spreads around the [place] every year, usually between October and May. Anyone can get the flu, but it is more dangerous for some people. Infants and young children, people 72years of age and older, pregnant women, and people with certain health conditions or a weakened immune system are at greatest risk of flu complications. Pneumonia, bronchitis, sinus infections and ear infections are examples of flu-related complications. If you have a medical condition, such as heart disease, cancer or diabetes, flu can make it worse. Flu can cause fever and chills, sore throat, muscle aches, fatigue, cough, headache, and runny or stuffy nose. Some people may have vomiting and diarrhea, though this is more common in children than adults. Each year, thousands of people in the [facility] die from flu, and many more are hospitalized.  Flu vaccine prevents millions of illnesses and flu-related visits to the doctor each year. Influenza vaccine  CDC recommends everyone 10months of age and older get vaccinated every flu season. Children 6 months through 6years of age may need 2 doses during a single flu season. Everyone else needs only 1 dose each flu season. It takes about 2 weeks for protection to develop after vaccination. There are many flu viruses, and they are always changing. Each year a new flu vaccine is made to protect against three or four viruses that are likely to cause disease in the upcoming flu season. Even when the vaccine doesn't exactly match these viruses, it may still provide some protection. Influenza vaccine does not cause flu. Influenza vaccine may be given at the same time as other vaccines. Talk with your health care provider  Tell your vaccine provider if the person getting the vaccine:  · Has had an allergic reaction after a previous dose of influenza vaccine, or has any severe, life-threatening allergies. · Has ever had Guillain-Barré Syndrome (also called GBS). In some cases, your health care provider may decide to postpone influenza vaccination to a future visit. People with minor illnesses, such as a cold, may be vaccinated. People who are moderately or severely ill should usually wait until they recover before getting influenza vaccine. Your health care provider can give you more information. Risks of a vaccine reaction  · Soreness, redness, and swelling where shot is given, fever, muscle aches, and headache can happen after influenza vaccine. · There may be a very small increased risk of Guillain-Barré Syndrome (GBS) after inactivated influenza vaccine (the flu shot). Kirsten Mar children who get the flu shot along with pneumococcal vaccine (PCV13), and/or DTaP vaccine at the same time might be slightly more likely to have a seizure caused by fever.  Tell your health care provider if a child who is getting flu vaccine has ever had a seizure. People sometimes faint after medical procedures, including vaccination. Tell your provider if you feel dizzy or have vision changes or ringing in the ears. As with any medicine, there is a very remote chance of a vaccine causing a severe allergic reaction, other serious injury, or death. What if there is a serious problem? An allergic reaction could occur after the vaccinated person leaves the clinic. If you see signs of a severe allergic reaction (hives, swelling of the face and throat, difficulty breathing, a fast heartbeat, dizziness, or weakness), call 9-1-1 and get the person to the nearest hospital.  For other signs that concern you, call your health care provider. Adverse reactions should be reported to the Vaccine Adverse Event Reporting System (VAERS). Your health care provider will usually file this report, or you can do it yourself. Visit the VAERS website at www.vaers. hhs.gov or call 7-500.400.8989. VAERS is only for reporting reactions, and VAERS staff do not give medical advice. The National Vaccine Injury Compensation Program  The National Vaccine Injury Compensation Program (VICP) is a federal program that was created to compensate people who may have been injured by certain vaccines. Visit the VICP website at www.hrsa.gov/vaccinecompensation or call 3-880.120.2984 to learn about the program and about filing a claim. There is a time limit to file a claim for compensation. How can I learn more? · Ask your healthcare provider. · Call your local or state health department. · Contact the Centers for Disease Control and Prevention (CDC):  ? Call 4-152.696.4719 (1-800-CDC-INFO) or  ? Visit CDC's website at www.cdc.gov/flu  Vaccine Information Statement (Interim)  Inactivated Influenza Vaccine  8/15/2019  42 MELINDA Juan Pablo Canela [id number]  Department of Health and Human Services  Centers for Disease Control and Prevention  Many Vaccine Information Statements are available in [place] and other languages. See www.immunize.org/vis. Muchas hojas de información sobre vacunas están disponibles en español y en otros idiomas. Visite www.immunize.org/vis. Care instructions adapted under license by [facility] ([facility]). If you have questions about a medical condition or this instruction, always ask your healthcare professional. [place] [address] any warranty or liability for your use of this information. Patient Education        hepatitis B adult vaccine  Pronunciation:  HEP a NABILA tis B a DULT VAX een  Brand:  Engerix-B, Heplisav-B, Recombivax HB Adult, Recombivax HB Dialysis Formulation  What is the most important information I should know about this vaccine? You should not receive hepatitis B vaccine if you are allergic to yeast.  This vaccine will not protect against hepatitis B if you are already infected with the virus, even if you do not yet show symptoms. What is hepatitis B vaccine? Hepatitis B is a serious disease caused by a virus. Hepatitis causes inflammation of the liver, vomiting, and jaundice (yellowing of the skin or eyes). Hepatitis can lead to liver cancer, cirrhosis, or death. Hepatitis B is spread through blood or bodily fluids, sexual contact, and by sharing items such as a razor, toothbrush, or IV drug needle with an infected person. Hepatitis B can also be passed to a baby during childbirth when the mother is infected. The hepatitis B adult vaccine is used to help prevent this disease in adults. The dialysis form of this vaccine is for adults receiving dialysis. This vaccine helps your body develop immunity to hepatitis B, but it will not treat an active infection you already have. Vaccination with hepatitis B adult vaccine is recommended for all adults who are at risk of getting hepatitis B.  Risk factors include: living with someone infected with hepatitis B virus; having more than one sex partner; men who have sex with men; having sexual contact with infected people; having hepatitis C, chronic liver disease, kidney disease, diabetes, HIV or AIDS; being on dialysis; using intravenous (IV) drugs; living or working in a facility for developmentally disabled people; working in healthcare or public safety and being exposed to blood or body fluids; living or working in a correctional facility; being a victim of sexual abuse or assault; and traveling to areas where hepatitis B is common. Like any vaccine, the hepatitis B vaccine may not provide protection from disease in every person. What should I discuss with my healthcare provider before receiving this vaccine? Hepatitis B vaccine will not protect against infection with hepatitis A, C, and E, or other viruses that affect the liver. It may also not protect against hepatitis B if you are already infected with the virus, even if you do not yet show symptoms. You should not receive this vaccine if you have ever had a life-threatening allergic reaction to any vaccine containing hepatitis B, or if you are allergic to yeast.  If you have any of these other conditions, your vaccine may need to be postponed or not given at all:  · multiple sclerosis;  · kidney disease (or if you are on dialysis);  · a bleeding or blood clotting disorder such as hemophilia or easy bruising;  · weak immune system (caused by disease or by using certain medicine);  · an allergy to latex; or  · a neurologic disorder or disease affecting the brain (or if this was a reaction to a previous vaccine). You can still receive a vaccine if you have a minor cold. If you have a more severe illness with a fever or any type of infection, your doctor may recommend waiting until you get better before you receive this vaccine. It is not known whether this vaccine will harm an unborn baby. However, if you are at a high risk for infection with hepatitis B during pregnancy, your doctor should determine whether you need this vaccine.   If you are pregnant, your name may be listed on a pregnancy registry to track the effects of this vaccine on the baby. It may not be safe to breastfeed while receiving this medicine. Ask your doctor about any risk. How is this vaccine given? This vaccine is given as an injection (shot) into a muscle. A healthcare provider will give you this injection. The hepatitis B vaccine is given in a series of 2 to 4 shots. The booster shots are sometimes given 1 month and 6 months after the first shot. If you have a high risk of hepatitis B infection, you may be given an additional booster 1 to 2 months after the third shot. Your individual booster schedule may be different from these guidelines. Follow your doctor's instructions or the schedule recommended by the health department of the state you live in. What happens if I miss a dose? Contact your doctor if you will miss a booster dose or if you get behind schedule. The next dose should be given as soon as possible. There is no need to start over. Be sure to receive all recommended doses of this vaccine or you may not be fully protected against disease. What happens if I overdose? An overdose of this vaccine is unlikely to occur. What should I avoid before or after receiving this vaccine? Follow your doctor's instructions about any restrictions on food, beverages, or activity. What are the possible side effects of this vaccine? Get emergency medical help if you have signs of an allergic reaction (hives, difficult breathing, swelling in your face or throat) or a severe skin reaction (fever, sore throat, burning eyes, skin pain, red or purple skin rash with blistering and peeling). You should not receive a booster vaccine if you had a life-threatening allergic reaction after the first shot. Keep track of any and all side effects you have after receiving this vaccine. When you receive a booster dose, you will need to tell the doctor if the previous shot caused any side effects.   Becoming infected with hepatitis B is much more dangerous to your health than receiving this vaccine. However, like any medicine, this vaccine can cause side effects but the risk of serious side effects is extremely low. Call your doctor at once if you have:  · a light-headed feeling, like you might pass out;  · seizure-like muscle movements; or  · fever, swollen glands. Common side effects include:  · headache;  · feeling tired; or  · redness, pain, swelling, or a lump where the shot was given. This is not a complete list of side effects and others may occur. Call your doctor for medical advice about side effects. You may report vaccine side effects to the [address] [place] [address] and [facility] at [phone]. What other drugs will affect hepatitis B vaccine? Other drugs may affect this vaccine, including prescription and over-the-counter medicines, vitamins, and herbal products. Tell your doctor about all your current medicines and any medicine you start or stop using. Where can I get more information? Your doctor or pharmacist can provide more information about this vaccine. Additional information is available from your local health department or the Centers for Disease Control and Prevention. Remember, keep this and all other medicines out of the reach of children, never share your medicines with others, and use this medication only for the indication prescribed. Every effort has been made to ensure that the information provided by [address] is accurate, up-to-date, and complete, but no guarantee is made to that effect. Drug information contained herein may be time sensitive. [facility] information has been compiled for use by healthcare practitioners and consumers in the [place] and therefore [facility] does not warrant that uses outside of the [place] are appropriate, unless specifically indicated otherwise.  [facility]'s drug information does not endorse drugs, diagnose patients or

## 2021-12-23 ENCOUNTER — CARE COORDINATION (OUTPATIENT)
Dept: CARE COORDINATION | Age: 41
End: 2021-12-23

## 2021-12-23 NOTE — CARE COORDINATION
You Patient resolved from the Care Transitions episode on 12/23/21  Discussed COVID-19 related testing which was available at this time. Test results were positive. Patient informed of results, if available? Yes    Patient/family has been provided the following resources and education related to COVID-19:                         Signs, symptoms and red flags related to COVID-19            CDC exposure and quarantine guidelines            [facility] exposure contact - [phone]            Contact for their local Department of Health                 Patient currently reports that the following symptoms have improved:  pt states he is feeling better. No further outreach scheduled with this CTN/ACM. Episode of Care resolved. Patient has this CTN/ACM contact information if future needs arise.

## 2022-02-20 DIAGNOSIS — Z79.4 TYPE 2 DIABETES MELLITUS WITHOUT COMPLICATION, WITH LONG-TERM CURRENT USE OF INSULIN (HCC): ICD-10-CM

## 2022-02-20 DIAGNOSIS — E11.9 TYPE 2 DIABETES MELLITUS WITHOUT COMPLICATION, WITH LONG-TERM CURRENT USE OF INSULIN (HCC): ICD-10-CM

## 2022-02-21 RX ORDER — INSULIN GLARGINE 100 [IU]/ML
80 INJECTION, SOLUTION SUBCUTANEOUS NIGHTLY
Qty: 15 ML | Refills: 2 | Status: SHIPPED | OUTPATIENT
Start: 2022-02-21 | End: 2022-07-12

## 2022-02-21 NOTE — TELEPHONE ENCOUNTER
Last visit: 12/21/2021  Last Med refill:   Does patient have enough medication for 72 hours: No:     Next Visit Date:  No future appointments. Health Maintenance   Topic Date Due    Hepatitis C screen  Never done    Varicella vaccine (1 of 2 - 2-dose childhood series) Never done    COVID-19 Vaccine (1) Never done    Hepatitis B vaccine (2 of 3 - Risk 3-dose series) 01/18/2022    A1C test (Diabetic or Prediabetic)  03/21/2022    Diabetic retinal exam  04/13/2022    Diabetic microalbuminuria test  05/20/2022    Lipid screen  05/20/2022    Potassium monitoring  05/20/2022    Creatinine monitoring  05/20/2022    Diabetic foot exam  08/17/2022    Depression Screen  08/17/2022    DTaP/Tdap/Td vaccine (2 - Td or Tdap) 08/15/2027    Pneumococcal 0-64 years Vaccine (2 of 2 - PPSV23) 03/08/2045    Flu vaccine  Completed    HIV screen  Completed    Hepatitis A vaccine  Aged Out    Hib vaccine  Aged Out    Meningococcal (ACWY) vaccine  Aged Out       Hemoglobin A1C (%)   Date Value   12/21/2021 11.6   08/17/2021 10.6   05/20/2021 11.3             ( goal A1C is < 7)   Microalb/Crt.  Ratio (mcg/mg creat)   Date Value   05/20/2021 150 (H)     LDL Cholesterol (mg/dL)   Date Value   05/20/2021 131 (H)   12/05/2019 130       (goal LDL is <100)   AST (U/L)   Date Value   04/07/2019 71 (H)     ALT (U/L)   Date Value   04/07/2019 98 (H)     BUN (mg/dL)   Date Value   05/20/2021 9     BP Readings from Last 3 Encounters:   12/21/21 (!) 158/88   12/07/21 (!) 156/96   11/05/21 138/78          (goal 120/80)    All Future Testing planned in [facility]              Patient Active Problem List:     Type 2 diabetes mellitus without complication, with long-term current use of insulin (HCC)     Essential hypertension     Morbid obesity ([address]     Scalp lesion     Erectile dysfunction     Morbid obesity with BMI of 45.0-49.9, adult ([address]

## 2022-02-22 DIAGNOSIS — E11.9 TYPE 2 DIABETES MELLITUS WITHOUT COMPLICATION, WITH LONG-TERM CURRENT USE OF INSULIN (HCC): ICD-10-CM

## 2022-02-22 DIAGNOSIS — N52.9 ERECTILE DYSFUNCTION, UNSPECIFIED ERECTILE DYSFUNCTION TYPE: ICD-10-CM

## 2022-02-22 DIAGNOSIS — Z79.4 TYPE 2 DIABETES MELLITUS WITHOUT COMPLICATION, WITH LONG-TERM CURRENT USE OF INSULIN (HCC): ICD-10-CM

## 2022-02-22 DIAGNOSIS — I10 ESSENTIAL HYPERTENSION: ICD-10-CM

## 2022-02-22 RX ORDER — LISINOPRIL AND HYDROCHLOROTHIAZIDE 20; 12.5 MG/1; MG/1
2 TABLET ORAL DAILY
Qty: 60 TABLET | Refills: 0 | Status: SHIPPED | OUTPATIENT
Start: 2022-02-22 | End: 2022-03-29 | Stop reason: SDUPTHER

## 2022-02-22 RX ORDER — BENZONATATE 100 MG/1
100 CAPSULE ORAL 2 TIMES DAILY PRN
Qty: 20 CAPSULE | Refills: 0 | Status: SHIPPED | OUTPATIENT
Start: 2022-02-22 | End: 2022-03-04

## 2022-02-22 RX ORDER — TADALAFIL 10 MG/1
10 TABLET ORAL DAILY PRN
Qty: 9 TABLET | Refills: 0 | Status: SHIPPED | OUTPATIENT
Start: 2022-02-22

## 2022-02-22 RX ORDER — TIZANIDINE 2 MG/1
2 TABLET ORAL EVERY 8 HOURS PRN
Qty: 15 TABLET | Refills: 0 | OUTPATIENT
Start: 2022-02-22

## 2022-02-22 RX ORDER — ATORVASTATIN CALCIUM 40 MG/1
40 TABLET, FILM COATED ORAL DAILY
Qty: 30 TABLET | Refills: 3 | Status: SHIPPED | OUTPATIENT
Start: 2022-02-22 | End: 2022-10-25 | Stop reason: SDUPTHER

## 2022-02-22 NOTE — TELEPHONE ENCOUNTER
Last visit: 12/21/2021  Last Med refill: 12/21/2021  Does patient have enough medication for 72 hours: No:     Next Visit Date:  No future appointments. Health Maintenance   Topic Date Due    Hepatitis C screen  Never done    Varicella vaccine (1 of 2 - 2-dose childhood series) Never done    COVID-19 Vaccine (1) Never done    Hepatitis B vaccine (2 of 3 - Risk 3-dose series) 01/18/2022    A1C test (Diabetic or Prediabetic)  03/21/2022    Diabetic retinal exam  04/13/2022    Diabetic microalbuminuria test  05/20/2022    Lipid screen  05/20/2022    Potassium monitoring  05/20/2022    Creatinine monitoring  05/20/2022    Diabetic foot exam  08/17/2022    Depression Screen  08/17/2022    DTaP/Tdap/Td vaccine (2 - Td or Tdap) 08/15/2027    Pneumococcal 0-64 years Vaccine (2 of 2 - PPSV23) 03/08/2045    Flu vaccine  Completed    HIV screen  Completed    Hepatitis A vaccine  Aged Out    Hib vaccine  Aged Out    Meningococcal (ACWY) vaccine  Aged Out       Hemoglobin A1C (%)   Date Value   12/21/2021 11.6   08/17/2021 10.6   05/20/2021 11.3             ( goal A1C is < 7)   Microalb/Crt.  Ratio (mcg/mg creat)   Date Value   05/20/2021 150 (H)     LDL Cholesterol (mg/dL)   Date Value   05/20/2021 131 (H)   12/05/2019 130       (goal LDL is <100)   AST (U/L)   Date Value   04/07/2019 71 (H)     ALT (U/L)   Date Value   04/07/2019 98 (H)     BUN (mg/dL)   Date Value   05/20/2021 9     BP Readings from Last 3 Encounters:   12/21/21 (!) 158/88   12/07/21 (!) 156/96   11/05/21 138/78          (goal 120/80)    All Future Testing planned in [facility]              Patient Active Problem List:     Type 2 diabetes mellitus without complication, with long-term current use of insulin (HCC)     Essential hypertension     Morbid obesity ([address]     Scalp lesion     Erectile dysfunction     Morbid obesity with BMI of 45.0-49.9, adult ([address]

## 2022-02-22 NOTE — TELEPHONE ENCOUNTER
Last visit: 12/21/2021  Last Med refill: 8/7/2021  Does patient have enough medication for 72 hours: No:     Next Visit Date:  No future appointments. Health Maintenance   Topic Date Due    Hepatitis C screen  Never done    Varicella vaccine (1 of 2 - 2-dose childhood series) Never done    COVID-19 Vaccine (1) Never done    Hepatitis B vaccine (2 of 3 - Risk 3-dose series) 01/18/2022    A1C test (Diabetic or Prediabetic)  03/21/2022    Diabetic retinal exam  04/13/2022    Diabetic microalbuminuria test  05/20/2022    Lipid screen  05/20/2022    Potassium monitoring  05/20/2022    Creatinine monitoring  05/20/2022    Diabetic foot exam  08/17/2022    Depression Screen  08/17/2022    DTaP/Tdap/Td vaccine (2 - Td or Tdap) 08/15/2027    Pneumococcal 0-64 years Vaccine (2 of 2 - PPSV23) 03/08/2045    Flu vaccine  Completed    HIV screen  Completed    Hepatitis A vaccine  Aged Out    Hib vaccine  Aged Out    Meningococcal (ACWY) vaccine  Aged Out       Hemoglobin A1C (%)   Date Value   12/21/2021 11.6   08/17/2021 10.6   05/20/2021 11.3             ( goal A1C is < 7)   Microalb/Crt.  Ratio (mcg/mg creat)   Date Value   05/20/2021 150 (H)     LDL Cholesterol (mg/dL)   Date Value   05/20/2021 131 (H)   12/05/2019 130       (goal LDL is <100)   AST (U/L)   Date Value   04/07/2019 71 (H)     ALT (U/L)   Date Value   04/07/2019 98 (H)     BUN (mg/dL)   Date Value   05/20/2021 9     BP Readings from Last 3 Encounters:   12/21/21 (!) 158/88   12/07/21 (!) 156/96   11/05/21 138/78          (goal 120/80)    All Future Testing planned in [facility]              Patient Active Problem List:     Type 2 diabetes mellitus without complication, with long-term current use of insulin (HCC)     Essential hypertension     Morbid obesity ([address]     Scalp lesion     Erectile dysfunction     Morbid obesity with BMI of 45.0-49.9, adult ([address]

## 2022-02-23 RX ORDER — GABAPENTIN 100 MG/1
300 CAPSULE ORAL 3 TIMES DAILY
Qty: 270 CAPSULE | Refills: 0 | OUTPATIENT
Start: 2022-02-23 | End: 2022-03-25

## 2022-03-29 ENCOUNTER — OFFICE VISIT (OUTPATIENT)
Dept: FAMILY MEDICINE CLINIC | Age: 42
End: 2022-03-29
Payer: COMMERCIAL

## 2022-03-29 DIAGNOSIS — E11.9 TYPE 2 DIABETES MELLITUS WITHOUT COMPLICATION, WITH LONG-TERM CURRENT USE OF INSULIN (HCC): ICD-10-CM

## 2022-03-29 DIAGNOSIS — I10 ESSENTIAL HYPERTENSION: Primary | ICD-10-CM

## 2022-03-29 DIAGNOSIS — M25.511 ACUTE PAIN OF RIGHT SHOULDER: ICD-10-CM

## 2022-03-29 DIAGNOSIS — Z79.4 TYPE 2 DIABETES MELLITUS WITHOUT COMPLICATION, WITH LONG-TERM CURRENT USE OF INSULIN (HCC): ICD-10-CM

## 2022-03-29 PROCEDURE — 99213 OFFICE O/P EST LOW 20 MIN: CPT | Performed by: STUDENT IN AN ORGANIZED HEALTH CARE EDUCATION/TRAINING PROGRAM

## 2022-03-29 RX ORDER — LISINOPRIL AND HYDROCHLOROTHIAZIDE 20; 12.5 MG/1; MG/1
2 TABLET ORAL DAILY
Qty: 60 TABLET | Refills: 3 | Status: SHIPPED | OUTPATIENT
Start: 2022-03-29 | End: 2022-10-25 | Stop reason: SDUPTHER

## 2022-03-29 RX ORDER — NAPROXEN 500 MG/1
500 TABLET ORAL 2 TIMES DAILY PRN
Qty: 60 TABLET | Refills: 0 | Status: SHIPPED | OUTPATIENT
Start: 2022-03-29 | End: 2022-06-21 | Stop reason: SDUPTHER

## 2022-03-29 ASSESSMENT — ENCOUNTER SYMPTOMS: SHORTNESS OF BREATH: 0

## 2022-03-29 NOTE — PROGRESS NOTES
I have reviewed and discussed key elements of [address] [facility] with the resident including plan of care and follow up and agree with the care mitch plan. Past Medical History:   Diagnosis Date    Diabetes mellitus ([facility]     Erectile dysfunction 8/15/2017    Hypertension     Morbid obesity ([facility] 4/28/2016    Type 2 diabetes mellitus without complication ([facility] 3/82/2288        Diagnosis Orders   1. Essential hypertension  lisinopril-hydroCHLOROthiazide (PRINZIDE;ZESTORETIC) 20-12.5 MG per tablet   2. Acute pain of right shoulder  naproxen (NAPROSYN) 500 MG tablet    diclofenac sodium (VOLTAREN) 1 % GEL   3.  Type 2 diabetes mellitus without complication, with long-term current use of insulin (HCC)  SITagliptin (JANUVIA) 100 MG tablet

## 2022-03-29 NOTE — PROGRESS NOTES
Subjective:    Elsa Roth is a 43 y.o. male with  has a past medical history of Diabetes mellitus ([facility] Erectile dysfunction, Hypertension, Morbid obesity ([facility] and Type 2 diabetes mellitus without complication ([facility] Family History   Problem Relation Age of Onset    Diabetes Mother     High Cholesterol Mother     High Blood Pressure Mother     Heart Disease Mother        Presented tothe office today for:  No chief complaint on file. HPI 43year old male with past medical history of hypertension and type 2 diabetes here for shoulder pain. Hypertension  - Well controlled  - /88  - compliant with mediation  - Takes Lisinopril/HCTZ 20-12.5 mg twice daily    Right Shoulder Pain  - Has been working in a new role  - Assembly line at [employer]  - Working a lot more with arms  - No trauma or injury  - Muscle soreness, 7/10 in intensity only when he works  - Has tried [employer] with minimal relief    Review of Systems   Constitutional: Negative for fever. Respiratory: Negative for shortness of breath. Cardiovascular: Negative for chest pain. Musculoskeletal: Positive for arthralgias. All other systems reviewed and are negative. Objective: There were no vitals taken for this visit. BP Readings from Last 3 Encounters:   12/21/21 (!) 158/88   12/07/21 (!) 156/96   11/05/21 138/78     Physical Exam  Vitals reviewed. Constitutional:       General: He is awake. Cardiovascular:      Rate and Rhythm: Normal rate and regular rhythm. Heart sounds: Normal heart sounds. Musculoskeletal:      Right shoulder: Normal. No deformity, effusion or tenderness. Normal range of motion. Normal strength. Left shoulder: Normal. No deformity, effusion or tenderness. Normal range of motion. Normal strength. Neurological:      Mental Status: He is alert. Psychiatric:         Behavior: Behavior is cooperative.        Lab Results   Component Value Date    WBC 9.0 04/07/2019    HGB 16.8 04/07/2019    HCT 49.2 04/07/2019     04/07/2019    CHOL 212 (H) 05/20/2021    TRIG 154 (H) 05/20/2021    HDL 50 05/20/2021    ALT 98 (H) 04/07/2019    AST 71 (H) 04/07/2019     05/20/2021    K 4.0 05/20/2021    CL 95 (L) 05/20/2021    CREATININE 0.83 05/20/2021    BUN 9 05/20/2021    CO2 25 05/20/2021    TSH 1.04 02/21/2013    LABA1C 11.6 12/21/2021    LABMICR 150 (H) 05/20/2021     Lab Results   Component Value Date    CALCIUM 10.1 05/20/2021     Lab Results   Component Value Date    LDLCHOLESTEROL 131 (H) 05/20/2021       Assessment and Plan:    1. Essential hypertension  - lisinopril-hydroCHLOROthiazide (PRINZIDE;ZESTORETIC) 20-12.5 MG per tablet; Take 2 tablets by mouth daily  Dispense: 60 tablet; Refill: 3  - Refill  - Continue regimen    2. Acute pain of right shoulder  - naproxen (NAPROSYN) 500 MG tablet; Take 1 tablet by mouth 2 times daily as needed for Pain  Dispense: 60 tablet; Refill: 0  - diclofenac sodium (VOLTAREN) 1 % GEL; Apply 2 g topically 4 times daily  Dispense: 150 g; Refill: 1    3. Type 2 diabetes mellitus without complication, with long-term current use of insulin (HCC)  - SITagliptin (JANUVIA) 100 MG tablet; take 1 tablet by mouth once daily  Dispense: 90 tablet;  Refill: 1  - Refill      Requested Prescriptions     Signed Prescriptions Disp Refills    SITagliptin (JANUVIA) 100 MG tablet 90 tablet 1     Sig: take 1 tablet by mouth once daily    lisinopril-hydroCHLOROthiazide (PRINZIDE;ZESTORETIC) 20-12.5 MG per tablet 60 tablet 3     Sig: Take 2 tablets by mouth daily    naproxen (NAPROSYN) 500 MG tablet 60 tablet 0     Sig: Take 1 tablet by mouth 2 times daily as needed for Pain    diclofenac sodium (VOLTAREN) 1 %  g 1     Sig: Apply 2 g topically 4 times daily       Medications Discontinued During This Encounter   Medication Reason    tiZANidine (ZANAFLEX) 2 MG tablet LIST CLEANUP    nystatin (MYCOSTATIN) 939742 UNIT/GM powder LIST CLEANUP    Alum Sulfate-Ca Acetate (DOMEBORO) PACK LIST CLEANUP    Blood Pressure KIT LIST CLEANUP    Colloidal Oatmeal (AVEENO ECZEMA THERAPY) 1 % CREA LIST CLEANUP    SITagliptin (JANUVIA) 100 MG tablet REORDER    lisinopril-hydroCHLOROthiazide (PRINZIDE;ZESTORETIC) 20-12.5 MG per tablet REORDER       Return in about 3 months (around 6/29/2022) for FU HTN.

## 2022-06-21 DIAGNOSIS — E11.9 TYPE 2 DIABETES MELLITUS WITHOUT COMPLICATION, WITHOUT LONG-TERM CURRENT USE OF INSULIN (HCC): ICD-10-CM

## 2022-06-21 DIAGNOSIS — M25.511 ACUTE PAIN OF RIGHT SHOULDER: ICD-10-CM

## 2022-06-21 RX ORDER — NAPROXEN 500 MG/1
500 TABLET ORAL 2 TIMES DAILY PRN
Qty: 60 TABLET | Refills: 0 | Status: SHIPPED | OUTPATIENT
Start: 2022-06-21 | End: 2022-10-25 | Stop reason: SDUPTHER

## 2022-06-21 RX ORDER — PEN NEEDLE, DIABETIC 31 GX5/16"
NEEDLE, DISPOSABLE MISCELLANEOUS
Qty: 100 EACH | Refills: 3 | Status: SHIPPED | OUTPATIENT
Start: 2022-06-21 | End: 2022-10-25 | Stop reason: SDUPTHER

## 2022-06-21 NOTE — TELEPHONE ENCOUNTER
----- Message from Paola Law sent at 6/21/2022  9:36 AM EDT -----  Subject: Refill Request    QUESTIONS  Name of Medication? naproxen (NAPROSYN) 500 MG tablet  Patient-reported dosage and instructions? 1 tablet daily  How many days do you have left? 0  Preferred Pharmacy? [address] phone number (if available)? [phone]  ---------------------------------------------------------------------------  --------------,  Name of Medication? Insulin Pen Needle (B-D UF III MINI PEN NEEDLES) 31G X   5 MM MISC  Patient-reported dosage and instructions? 1 needle daily  How many days do you have left? 0  Preferred Pharmacy? [address],[facility] phone number (if available)? [phone]  ---------------------------------------------------------------------------  --------------  CALL BACK INFO  What is the best way for the office to contact you? OK to leave message on   voicemail  Preferred Call Back Phone Number? [phone]  ---------------------------------------------------------------------------  --------------  SCRIPT ANSWERS  Relationship to Patient?  Self

## 2022-07-11 DIAGNOSIS — E11.9 TYPE 2 DIABETES MELLITUS WITHOUT COMPLICATION, WITH LONG-TERM CURRENT USE OF INSULIN (HCC): ICD-10-CM

## 2022-07-11 DIAGNOSIS — Z79.4 TYPE 2 DIABETES MELLITUS WITHOUT COMPLICATION, WITH LONG-TERM CURRENT USE OF INSULIN (HCC): ICD-10-CM

## 2022-07-12 RX ORDER — INSULIN GLARGINE 100 [IU]/ML
INJECTION, SOLUTION SUBCUTANEOUS
Qty: 3 ML | Refills: 5 | Status: SHIPPED | OUTPATIENT
Start: 2022-07-12 | End: 2022-10-04

## 2022-07-12 NOTE — TELEPHONE ENCOUNTER
Last visit: 3/29/22  Last Med refill: 4/21/22  Does patient have enough medication for 72 hours: No:     Next Visit Date:  No future appointments. Health Maintenance   Topic Date Due    Varicella vaccine (1 of 2 - 2-dose childhood series) Never done    COVID-19 Vaccine (1) Never done    Hepatitis C screen  Never done    Pneumococcal 0-64 years Vaccine (2 - PCV) 04/28/2017    Hepatitis B vaccine (2 of 3 - Risk 3-dose series) 01/18/2022    A1C test (Diabetic or Prediabetic)  03/21/2022    Diabetic retinal exam  04/13/2022    Diabetic microalbuminuria test  05/20/2022    Lipids  05/20/2022    Diabetic foot exam  08/17/2022    Depression Screen  08/17/2022    Flu vaccine (1) 09/01/2022    DTaP/Tdap/Td vaccine (2 - Td or Tdap) 08/15/2027    HIV screen  Completed    Hepatitis A vaccine  Aged Out    Hib vaccine  Aged Out    Meningococcal (ACWY) vaccine  Aged Out       Hemoglobin A1C (%)   Date Value   12/21/2021 11.6   08/17/2021 10.6   05/20/2021 11.3             ( goal A1C is < 7)   Microalb/Crt.  Ratio (mcg/mg creat)   Date Value   05/20/2021 150 (H)     LDL Cholesterol (mg/dL)   Date Value   05/20/2021 131 (H)   12/05/2019 130       (goal LDL is <100)   AST (U/L)   Date Value   04/07/2019 71 (H)     ALT (U/L)   Date Value   04/07/2019 98 (H)     BUN (mg/dL)   Date Value   05/20/2021 9     BP Readings from Last 3 Encounters:   12/21/21 (!) 158/88   12/07/21 (!) 156/96   11/05/21 138/78          (goal 120/80)    All Future Testing planned in [facility]              Patient Active Problem List:     Type 2 diabetes mellitus without complication, with long-term current use of insulin (HCC)     Essential hypertension     Morbid obesity ([facility]     Scalp lesion     Erectile dysfunction     Morbid obesity with BMI of 45.0-49.9, adult ([address]

## 2022-10-04 DIAGNOSIS — E11.9 TYPE 2 DIABETES MELLITUS WITHOUT COMPLICATION, WITH LONG-TERM CURRENT USE OF INSULIN (HCC): ICD-10-CM

## 2022-10-04 DIAGNOSIS — Z79.4 TYPE 2 DIABETES MELLITUS WITHOUT COMPLICATION, WITH LONG-TERM CURRENT USE OF INSULIN (HCC): ICD-10-CM

## 2022-10-04 RX ORDER — INSULIN GLARGINE 100 [IU]/ML
INJECTION, SOLUTION SUBCUTANEOUS
Qty: 75 ML | Refills: 0 | Status: SHIPPED | OUTPATIENT
Start: 2022-10-04

## 2022-10-04 NOTE — TELEPHONE ENCOUNTER
Last visit: 03/29/22  Last Med refill: 07/12/22  Does patient have enough medication for 72 hours: No:     Next Visit Date:  No future appointments. Health Maintenance   Topic Date Due    COVID-19 Vaccine (1) Never done    Varicella vaccine (1 of 2 - 2-dose childhood series) Never done    Hepatitis C screen  Never done    Hepatitis B vaccine (2 of 4 - 4-dose series) 01/18/2022    A1C test (Diabetic or Prediabetic)  03/21/2022    Diabetic retinal exam  04/13/2022    Diabetic microalbuminuria test  05/20/2022    Lipids  05/20/2022    Flu vaccine (1) 08/01/2022    Diabetic foot exam  08/17/2022    Depression Screen  08/17/2022    DTaP/Tdap/Td vaccine (2 - Td or Tdap) 08/15/2027    Pneumococcal 0-64 years Vaccine  Completed    HIV screen  Completed    Hepatitis A vaccine  Aged Out    Hib vaccine  Aged Out    Meningococcal (ACWY) vaccine  Aged Out       Hemoglobin A1C (%)   Date Value   12/21/2021 11.6   08/17/2021 10.6   05/20/2021 11.3             ( goal A1C is < 7)   Microalb/Crt.  Ratio (mcg/mg creat)   Date Value   05/20/2021 150 (H)     LDL Cholesterol (mg/dL)   Date Value   05/20/2021 131 (H)   12/05/2019 130       (goal LDL is <100)   AST (U/L)   Date Value   04/07/2019 71 (H)     ALT (U/L)   Date Value   04/07/2019 98 (H)     BUN (mg/dL)   Date Value   05/20/2021 9     BP Readings from Last 3 Encounters:   12/21/21 (!) 158/88   12/07/21 (!) 156/96   11/05/21 138/78          (goal 120/80)    All Future Testing planned in [facility]              Patient Active Problem List:     Type 2 diabetes mellitus without complication, with long-term current use of insulin (HCC)     Essential hypertension     Morbid obesity ([address]     Scalp lesion     Erectile dysfunction     Morbid obesity with BMI of 45.0-49.9, adult ([address]

## 2022-10-25 ENCOUNTER — TELEMEDICINE (OUTPATIENT)
Dept: FAMILY MEDICINE CLINIC | Age: 42
End: 2022-10-25
Payer: COMMERCIAL

## 2022-10-25 DIAGNOSIS — I10 ESSENTIAL HYPERTENSION: ICD-10-CM

## 2022-10-25 DIAGNOSIS — Z79.4 TYPE 2 DIABETES MELLITUS WITHOUT COMPLICATION, WITH LONG-TERM CURRENT USE OF INSULIN (HCC): ICD-10-CM

## 2022-10-25 DIAGNOSIS — M25.511 ACUTE PAIN OF RIGHT SHOULDER: ICD-10-CM

## 2022-10-25 DIAGNOSIS — E11.9 TYPE 2 DIABETES MELLITUS WITHOUT COMPLICATION, WITH LONG-TERM CURRENT USE OF INSULIN (HCC): ICD-10-CM

## 2022-10-25 PROCEDURE — 99443 PR PHYS/QHP TELEPHONE EVALUATION 21-30 MIN: CPT | Performed by: STUDENT IN AN ORGANIZED HEALTH CARE EDUCATION/TRAINING PROGRAM

## 2022-10-25 RX ORDER — NAPROXEN 500 MG/1
500 TABLET ORAL 2 TIMES DAILY PRN
Qty: 60 TABLET | Refills: 0 | Status: SHIPPED | OUTPATIENT
Start: 2022-10-25

## 2022-10-25 RX ORDER — ATORVASTATIN CALCIUM 40 MG/1
40 TABLET, FILM COATED ORAL DAILY
Qty: 90 TABLET | Refills: 1 | Status: SHIPPED | OUTPATIENT
Start: 2022-10-25

## 2022-10-25 RX ORDER — PEN NEEDLE, DIABETIC 31 GX5/16"
NEEDLE, DISPOSABLE MISCELLANEOUS
Qty: 100 EACH | Refills: 3 | Status: SHIPPED | OUTPATIENT
Start: 2022-10-25

## 2022-10-25 RX ORDER — LISINOPRIL AND HYDROCHLOROTHIAZIDE 20; 12.5 MG/1; MG/1
2 TABLET ORAL DAILY
Qty: 60 TABLET | Refills: 3 | Status: SHIPPED | OUTPATIENT
Start: 2022-10-25

## 2022-10-25 SDOH — ECONOMIC STABILITY: FOOD INSECURITY: WITHIN THE PAST 12 MONTHS, YOU WORRIED THAT YOUR FOOD WOULD RUN OUT BEFORE YOU GOT MONEY TO BUY MORE.: NEVER TRUE

## 2022-10-25 SDOH — ECONOMIC STABILITY: FOOD INSECURITY: WITHIN THE PAST 12 MONTHS, THE FOOD YOU BOUGHT JUST DIDN'T LAST AND YOU DIDN'T HAVE MONEY TO GET MORE.: NEVER TRUE

## 2022-10-25 ASSESSMENT — SOCIAL DETERMINANTS OF HEALTH (SDOH): HOW HARD IS IT FOR YOU TO PAY FOR THE VERY BASICS LIKE FOOD, HOUSING, MEDICAL CARE, AND HEATING?: NOT VERY HARD

## 2022-10-25 ASSESSMENT — PATIENT HEALTH QUESTIONNAIRE - PHQ9
SUM OF ALL RESPONSES TO PHQ QUESTIONS 1-9: 0
2. FEELING DOWN, DEPRESSED OR HOPELESS: 0
SUM OF ALL RESPONSES TO PHQ QUESTIONS 1-9: 0
1. LITTLE INTEREST OR PLEASURE IN DOING THINGS: 0
SUM OF ALL RESPONSES TO PHQ9 QUESTIONS 1 & 2: 0

## 2022-10-25 NOTE — PROGRESS NOTES
Otoniel Martell is a 43 y.o. male evaluated via telephone on 10/25/2022 for Diabetes (Has not tested in about a week, asking for new lancets, supplies for meter )  . Documentation:  I communicated with the patient and/or health care decision maker about     Refills and diabetes    Details of this discussion including any medical advice provided:     Medications refilled  Discussed with patient that his last HbA1c was checked in December 2021, at that time was 11.6  Reordered HbA1c for him to check, states he will go tomorrow morning  States he does not check his sugars because he does not have any more pen needles  States he is compliant with his insulin and his medications  Denies any symptoms of hypoglycemia  Has no other complaints today  Advised patient to return in 4 weeks to discuss his diabetes and make further adjustments as needed based on his A1c    Total Time: minutes: 21-30 minutes    Otoniel Martell was evaluated through a synchronous (real-time) audio encounter. Patient identification was verified at the start of the visit. He (or guardian if applicable) is aware that this is a billable service, which includes applicable co-pays. This visit was conducted with the patient's (and/or legal guardian's) verbal consent. He has not had a related appointment within my department in the past 7 days or scheduled within the next 24 hours. The patient was located at Home: [address] [place] [address]. The provider was located at [facility] (Appt Dept): [address] [place] [address].     Note: not billable if this call serves to triage the patient into an appointment for the relevant concern    Algernon Felty, MD

## 2022-11-04 NOTE — PROGRESS NOTES
Attending Physician Statement    Wt Readings from Last 3 Encounters:   12/21/21 286 lb 9.6 oz (130 kg)   12/07/21 288 lb (130.6 kg)   11/05/21 292 lb 9.6 oz (132.7 kg)     Temp Readings from Last 3 Encounters:   12/21/21 98.1 °F (36.7 °C) (Temporal)   12/07/21 98.2 °F (36.8 °C) (Oral)   11/05/21 97.3 °F (36.3 °C) (Temporal)     BP Readings from Last 3 Encounters:   12/21/21 (!) 158/88   12/07/21 (!) 156/96   11/05/21 138/78     Pulse Readings from Last 3 Encounters:   12/21/21 102   12/07/21 116   11/05/21 92         I have discussed the care of Jass Coon, including pertinent history and exam findings with the resident. I have reviewed the key elements of all parts of the encounter with the resident. I agree with the assessment, plan and orders as documented by the resident.   (GE Modifier)

## 2023-02-02 ENCOUNTER — TELEPHONE (OUTPATIENT)
Dept: FAMILY MEDICINE CLINIC | Age: 43
End: 2023-02-02

## 2023-02-02 NOTE — TELEPHONE ENCOUNTER
Pt contacted office and wanted to know if lead would bother his diabetes. Writer offered appt for pt to discuss with provider. Pt declined. Pt asked writer to send message for provider to contact pt. Writer informed message would be sent and gave protcol could take 24-48 hrs for response. Pt then stated he will be up here today at 4:30 PM waiting on the provider outside to discuss this matter with provider. Writer informed manager.

## 2023-02-06 NOTE — TELEPHONE ENCOUNTER
Writer spoke to pt and informed per provider the lead would not interfere with pt DM. Pt stated having a lot of leg pain while standing. Per provider Dr. Elayne Lerner pt could radhika a VV with him or care team. Writer scheduled appt 2-8-23. Pt unable to do any appts until after 3:45pm due to work.

## 2023-02-08 ENCOUNTER — TELEMEDICINE (OUTPATIENT)
Dept: FAMILY MEDICINE CLINIC | Age: 43
End: 2023-02-08
Payer: COMMERCIAL

## 2023-02-08 DIAGNOSIS — N52.9 ERECTILE DYSFUNCTION, UNSPECIFIED ERECTILE DYSFUNCTION TYPE: ICD-10-CM

## 2023-02-08 DIAGNOSIS — M54.50 CHRONIC LOW BACK PAIN, UNSPECIFIED BACK PAIN LATERALITY, UNSPECIFIED WHETHER SCIATICA PRESENT: Primary | ICD-10-CM

## 2023-02-08 DIAGNOSIS — E66.01 MORBID OBESITY WITH BMI OF 45.0-49.9, ADULT (HCC): ICD-10-CM

## 2023-02-08 DIAGNOSIS — G89.29 CHRONIC LOW BACK PAIN, UNSPECIFIED BACK PAIN LATERALITY, UNSPECIFIED WHETHER SCIATICA PRESENT: Primary | ICD-10-CM

## 2023-02-08 DIAGNOSIS — Z13.220 SCREENING FOR HYPERLIPIDEMIA: ICD-10-CM

## 2023-02-08 PROCEDURE — 99441 PR PHYS/QHP TELEPHONE EVALUATION 5-10 MIN: CPT | Performed by: STUDENT IN AN ORGANIZED HEALTH CARE EDUCATION/TRAINING PROGRAM

## 2023-02-08 RX ORDER — TADALAFIL 10 MG/1
10 TABLET ORAL DAILY PRN
Qty: 9 TABLET | Refills: 0 | Status: CANCELLED | OUTPATIENT
Start: 2023-02-08

## 2023-02-08 RX ORDER — NAPROXEN 500 MG/1
500 TABLET ORAL 2 TIMES DAILY WITH MEALS
Qty: 60 TABLET | Refills: 0 | Status: SHIPPED | OUTPATIENT
Start: 2023-02-08

## 2023-02-08 RX ORDER — ACETAMINOPHEN 500 MG
1000 TABLET ORAL 3 TIMES DAILY PRN
Qty: 180 TABLET | Refills: 0 | Status: SHIPPED | OUTPATIENT
Start: 2023-02-08

## 2023-02-08 ASSESSMENT — PATIENT HEALTH QUESTIONNAIRE - PHQ9
2. FEELING DOWN, DEPRESSED OR HOPELESS: 0
SUM OF ALL RESPONSES TO PHQ QUESTIONS 1-9: 0
SUM OF ALL RESPONSES TO PHQ QUESTIONS 1-9: 0
1. LITTLE INTEREST OR PLEASURE IN DOING THINGS: 0
SUM OF ALL RESPONSES TO PHQ QUESTIONS 1-9: 0
SUM OF ALL RESPONSES TO PHQ QUESTIONS 1-9: 0
SUM OF ALL RESPONSES TO PHQ9 QUESTIONS 1 & 2: 0

## 2023-02-08 NOTE — PROGRESS NOTES
Attending Physician Statement  I  have discussed the care of Heidi Morgan including pertinent history and exam findings with the resident. I agree with the assessment, plan and orders as documented by the resident. Addressed back pain   Needs to be seen in person    There were no vitals taken for this visit. BP Readings from Last 3 Encounters:   12/21/21 (!) 158/88   12/07/21 (!) 156/96   11/05/21 138/78     Wt Readings from Last 3 Encounters:   12/21/21 286 lb 9.6 oz (130 kg)   12/07/21 288 lb (130.6 kg)   11/05/21 292 lb 9.6 oz (132.7 kg)          Diagnosis Orders   1. Chronic low back pain, unspecified back pain laterality, unspecified whether sciatica present  naproxen (NAPROSYN) 500 MG tablet    acetaminophen (TYLENOL) 500 MG tablet      2. Screening for hyperlipidemia        3. Erectile dysfunction, unspecified erectile dysfunction type        4.  Morbid obesity with BMI of 45.0-49.9, adult [facility])            Chris Barry MD 2/9/2023 8:59 AM

## 2023-02-08 NOTE — PATIENT INSTRUCTIONS
Thank you for letting us take care of you today. We hope all your questions were addressed. If a question was overlooked or something else comes to mind after you return home, please contact a member of your Care Team listed below. Your Care Team at [place] [address] is Team #5  Zeke Conteh MD (Faculty)  Rachel Slater MD (Resident)  Dilma Sanchez MD (Resident)  Feroz Oneal MD (Resident)  Isidro Roche MD, (Resident)  Sang Lara., THEODORE Mercer., RMMYRNA Stallworth.,  GLORIAN  Bea Pérez., Karla Kinsey., Zackery Oppenheim [facility] office)  Rondel Schirmer, [address] (Clinical Practice Manager)  Jeanette Mccallum [facility] (Clinical Pharmacist)       Office phone number: [phone]    If you need to get in right away due to illness, please be advised we have \"Same Day\" appointments available Monday-Friday. Please call us at [phone] option #3 to schedule your \"Same Day\" appointment.

## 2023-02-08 NOTE — PROGRESS NOTES
Danielle Schulte is a 43 y.o. male evaluated via telephone on 2/8/2023 for Leg Pain  . Documentation:  I communicated with the patient and/or health care decision maker about medication refill. Details of this discussion including any medical advice provided: 49-year-old gentleman with a history of type 2 diabetes mellitus, morbid obesity, chronic back pain, essential hypertension is calling in today for medication refill for chronic back pain. 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 Patient is experiencing chronic back pain which was evaluated in person, per patient he has been experiencing this pain for years, no change in characteristic of the pain, patient is only using naproxen as needed. Denies any urinary/fecal symptoms, numbness and tingling in perennial area. No trauma mentioned by the patient. Plan:  The patient that he should come in in person for further evaluation, meanwhile patient can use naproxen and Tylenol as needed. Patient might need further imaging, with morbid obesity patient might be experiencing lumbar strain diabetic neuropathy which needs to be assessed in details.  -Still that he will come in next week for further evaluation. Total Time: minutes: 5-10 minutes    Danielle Schulte was evaluated through a synchronous (real-time) audio encounter. Patient identification was verified at the start of the visit. He (or guardian if applicable) is aware that this is a billable service, which includes applicable co-pays. This visit was conducted with the patient's (and/or legal guardian's) verbal consent. He has not had a related appointment within my department in the past 7 days or scheduled within the next 24 hours. The patient was located at Home: [address] [place] [address].   The provider was located at [place] [address]): [address] [place] [id number].    Note: not billable if this call serves to triage the patient into an appointment for the relevant concern    Alida Mccloud MD

## 2023-02-08 NOTE — PROGRESS NOTES
Visit Information    Have you changed or started any medications since your last visit including any over-the-counter medicines, vitamins, or herbal medicines? no   Have you stopped taking any of your medications? Is so, why? -  no  Are you having any side effects from any of your medications? - no    Have you seen any other physician or provider since your last visit?  no   Have you had any other diagnostic tests since your last visit?  no   Have you been seen in the emergency room and/or had an admission in a hospital since we last saw you?  no   Have you had your routine dental cleaning in the past 6 months?  no     Do you have an active MyChart account? If no, what is the barrier?   Yes    Patient Care Team:  Betzy Muniz MD as PCP - General (Emergency Medicine)  Isak Schumacher DO as Consulting Physician (General Surgery)    Medical History Review  Past Medical, Family, and Social History reviewed and does not contribute to the patient presenting condition    Health Maintenance   Topic Date Due    COVID-19 Vaccine (1) Never done    Varicella vaccine (1 of 2 - 2-dose childhood series) Never done    Hepatitis C screen  Never done    Hepatitis B vaccine (2 of 3 - Risk 3-dose series) 01/18/2022    Diabetic retinal exam  04/13/2022    Diabetic Alb to Cr ratio (uACR) test  05/20/2022    Lipids  05/20/2022    GFR test (Diabetes, CKD 3-4, OR last GFR 15-59)  05/20/2022    Flu vaccine (1) 08/01/2022    Diabetic foot exam  08/17/2022    A1C test (Diabetic or Prediabetic)  12/21/2022    Depression Screen  10/25/2023    DTaP/Tdap/Td vaccine (2 - Td or Tdap) 08/15/2027    Pneumococcal 0-64 years Vaccine  Completed    HIV screen  Completed    Hepatitis A vaccine  Aged Out    Hib vaccine  Aged Out    Meningococcal (ACWY) vaccine  Aged Out

## 2023-03-10 PROBLEM — Z13.220 SCREENING FOR HYPERLIPIDEMIA: Status: RESOLVED | Noted: 2023-02-08 | Resolved: 2023-03-10

## 2023-05-31 DIAGNOSIS — M54.50 CHRONIC LOW BACK PAIN, UNSPECIFIED BACK PAIN LATERALITY, UNSPECIFIED WHETHER SCIATICA PRESENT: ICD-10-CM

## 2023-05-31 DIAGNOSIS — G89.29 CHRONIC LOW BACK PAIN, UNSPECIFIED BACK PAIN LATERALITY, UNSPECIFIED WHETHER SCIATICA PRESENT: ICD-10-CM

## 2023-05-31 DIAGNOSIS — E11.9 TYPE 2 DIABETES MELLITUS WITHOUT COMPLICATION, WITH LONG-TERM CURRENT USE OF INSULIN (HCC): ICD-10-CM

## 2023-05-31 DIAGNOSIS — Z79.4 TYPE 2 DIABETES MELLITUS WITHOUT COMPLICATION, WITH LONG-TERM CURRENT USE OF INSULIN (HCC): ICD-10-CM

## 2023-05-31 RX ORDER — ATORVASTATIN CALCIUM 40 MG/1
TABLET, FILM COATED ORAL
Qty: 30 TABLET | Refills: 3 | Status: SHIPPED | OUTPATIENT
Start: 2023-05-31

## 2023-05-31 RX ORDER — NAPROXEN 500 MG/1
TABLET ORAL
Qty: 60 TABLET | Refills: 0 | Status: SHIPPED | OUTPATIENT
Start: 2023-05-31

## 2023-05-31 NOTE — TELEPHONE ENCOUNTER
E-scribe request for LIPITOR 40 MG TAB. Please review and e-scribe if applicable. Last Visit Date:  2/8/2023  Next Visit Date:  Visit date not found    Hemoglobin A1C (%)   Date Value   12/21/2021 11.6   08/17/2021 10.6   05/20/2021 11.3             ( goal A1C is < 7)   Microalb/Crt.  Ratio (mcg/mg creat)   Date Value   05/20/2021 150 (H)     LDL Cholesterol (mg/dL)   Date Value   05/20/2021 131 (H)       (goal LDL is <100)   AST (U/L)   Date Value   04/07/2019 71 (H)     ALT (U/L)   Date Value   04/07/2019 98 (H)     BUN (mg/dL)   Date Value   05/20/2021 9     BP Readings from Last 3 Encounters:   12/21/21 (!) 158/88   12/07/21 (!) 156/96   11/05/21 138/78          (goal 120/80)        Patient Active Problem List:     Type 2 diabetes mellitus without complication, with long-term current use of insulin (HCC)     Essential hypertension     Morbid obesity ([facility]     Scalp lesion     Erectile dysfunction     Morbid obesity with BMI of 45.0-49.9, adult (HCC)     Chronic low back pain      ----JF

## 2023-05-31 NOTE — TELEPHONE ENCOUNTER
E-scribe request for NAPROXEN 500 MG TAB. Please review and e-scribe if applicable. Last Visit Date:  2/8/2023  Next Visit Date:  5/31/2023    Hemoglobin A1C (%)   Date Value   12/21/2021 11.6   08/17/2021 10.6   05/20/2021 11.3             ( goal A1C is < 7)   Microalb/Crt.  Ratio (mcg/mg creat)   Date Value   05/20/2021 150 (H)     LDL Cholesterol (mg/dL)   Date Value   05/20/2021 131 (H)       (goal LDL is <100)   AST (U/L)   Date Value   04/07/2019 71 (H)     ALT (U/L)   Date Value   04/07/2019 98 (H)     BUN (mg/dL)   Date Value   05/20/2021 9     BP Readings from Last 3 Encounters:   12/21/21 (!) 158/88   12/07/21 (!) 156/96   11/05/21 138/78          (goal 120/80)        Patient Active Problem List:     Type 2 diabetes mellitus without complication, with long-term current use of insulin (HCC)     Essential hypertension     Morbid obesity ([address]     Scalp lesion     Erectile dysfunction     Morbid obesity with BMI of 45.0-49.9, adult (HCC)     Chronic low back pain      ----JF

## 2023-06-02 DIAGNOSIS — I10 ESSENTIAL HYPERTENSION: ICD-10-CM

## 2023-06-02 DIAGNOSIS — E11.9 TYPE 2 DIABETES MELLITUS WITHOUT COMPLICATION, WITH LONG-TERM CURRENT USE OF INSULIN (HCC): ICD-10-CM

## 2023-06-02 DIAGNOSIS — Z79.4 TYPE 2 DIABETES MELLITUS WITHOUT COMPLICATION, WITH LONG-TERM CURRENT USE OF INSULIN (HCC): ICD-10-CM

## 2023-06-02 NOTE — TELEPHONE ENCOUNTER
Faxed Refill Request of Januvia and Prinizide received from [employer]. Medication Pended. Pt last seen on 2/8/23, Next appt is currently unscheduled. Health Maintenance   Topic Date Due    COVID-19 Vaccine (1) Never done    Varicella vaccine (1 of 2 - 2-dose childhood series) Never done    Hepatitis C screen  Never done    Hepatitis B vaccine (2 of 3 - Risk 3-dose series) 01/18/2022    Diabetic retinal exam  04/13/2022    Diabetic Alb to Cr ratio (uACR) test  05/20/2022    Lipids  05/20/2022    GFR test (Diabetes, CKD 3-4, OR last GFR 15-59)  05/20/2022    Diabetic foot exam  08/17/2022    A1C test (Diabetic or Prediabetic)  12/21/2022    Flu vaccine (Season Ended) 08/01/2023    Depression Screen  02/08/2024    DTaP/Tdap/Td vaccine (2 - Td or Tdap) 08/15/2027    Pneumococcal 0-64 years Vaccine  Completed    HIV screen  Completed    Hepatitis A vaccine  Aged Out    Hib vaccine  Aged Out    Meningococcal (ACWY) vaccine  Aged Out       Hemoglobin A1C (%)   Date Value   12/21/2021 11.6   08/17/2021 10.6   05/20/2021 11.3             ( goal A1C is < 7)   Microalb/Crt. Ratio (mcg/mg creat)   Date Value   05/20/2021 150 (H)     LDL Cholesterol (mg/dL)   Date Value   05/20/2021 131 (H)       (goal LDL is <100)   AST (U/L)   Date Value   04/07/2019 71 (H)     ALT (U/L)   Date Value   04/07/2019 98 (H)     BUN (mg/dL)   Date Value   05/20/2021 9     BP Readings from Last 3 Encounters:   12/21/21 (!) 158/88   12/07/21 (!) 156/96   11/05/21 138/78          (goal 120/80)    All Future Testing planned in [facility]  Lab Frequency Next Occurrence   Hemoglobin A1C Once 10/25/2022   Lipid Panel Once 10/25/2022       Next Visit Date:  No future appointments.          Patient Active Problem List:     Type 2 diabetes mellitus without complication, with long-term current use of insulin ([address]     Essential hypertension     Morbid obesity (HCC)     Scalp lesion     Erectile dysfunction     Morbid obesity with BMI of 45.0-49.9,

## 2023-06-03 RX ORDER — LISINOPRIL AND HYDROCHLOROTHIAZIDE 20; 12.5 MG/1; MG/1
2 TABLET ORAL DAILY
Qty: 60 TABLET | Refills: 3 | Status: SHIPPED | OUTPATIENT
Start: 2023-06-03

## 2023-07-18 DIAGNOSIS — E11.9 TYPE 2 DIABETES MELLITUS WITHOUT COMPLICATION, WITHOUT LONG-TERM CURRENT USE OF INSULIN (HCC): ICD-10-CM

## 2023-07-18 NOTE — TELEPHONE ENCOUNTER
E-scribe request for DROPLET PEN NEEDLES . Please review and e-scribe if applicable. Last Visit Date:  2/8/23  Next Visit Date:  7/27/2023    Hemoglobin A1C (%)   Date Value   12/21/2021 11.6   08/17/2021 10.6   05/20/2021 11.3             ( goal A1C is < 7)   Microalb/Crt.  Ratio (mcg/mg creat)   Date Value   05/20/2021 150 (H)     LDL Cholesterol (mg/dL)   Date Value   05/20/2021 131 (H)       (goal LDL is <100)   AST (U/L)   Date Value   04/07/2019 71 (H)     ALT (U/L)   Date Value   04/07/2019 98 (H)     BUN (mg/dL)   Date Value   05/20/2021 9     BP Readings from Last 3 Encounters:   12/21/21 (!) 158/88   12/07/21 (!) 156/96   11/05/21 138/78          (goal 120/80)        Patient Active Problem List:     Type 2 diabetes mellitus without complication, with long-term current use of insulin (HCC)     Essential hypertension     Morbid obesity ([address])     Scalp lesion     Erectile dysfunction     Morbid obesity with BMI of 45.0-49.9, adult (HCC)     Chronic low back pain      ----JF

## 2023-07-19 RX ORDER — PEN NEEDLE, DIABETIC 31 GX3/16"
NEEDLE, DISPOSABLE MISCELLANEOUS
Qty: 100 EACH | Refills: 3 | Status: SHIPPED | OUTPATIENT
Start: 2023-07-19

## 2023-09-02 DIAGNOSIS — Z79.4 TYPE 2 DIABETES MELLITUS WITHOUT COMPLICATION, WITH LONG-TERM CURRENT USE OF INSULIN (HCC): ICD-10-CM

## 2023-09-02 DIAGNOSIS — E11.9 TYPE 2 DIABETES MELLITUS WITHOUT COMPLICATION, WITH LONG-TERM CURRENT USE OF INSULIN (HCC): ICD-10-CM

## 2023-09-06 NOTE — TELEPHONE ENCOUNTER
Last visit: 02/08/23  Last Med refill: 10/04/22  Does patient have enough medication for 72 hours: No:     Next Visit Date:  No future appointments.     Health Maintenance   Topic Date Due    COVID-19 Vaccine (1) Never done    Varicella vaccine (1 of 2 - 2-dose childhood series) Never done    Hepatitis C screen  Never done    Hepatitis B vaccine (2 of 3 - Risk 3-dose series) 01/18/2022    Diabetic retinal exam  04/13/2022    Diabetic Alb to Cr ratio (uACR) test  05/20/2022    Lipids  05/20/2022    GFR test (Diabetes, CKD 3-4, OR last GFR 15-59)  05/20/2022    Diabetic foot exam  08/17/2022    A1C test (Diabetic or Prediabetic)  12/21/2022    Flu vaccine (1) 08/01/2023    Depression Screen  02/08/2024    DTaP/Tdap/Td vaccine (2 - Td or Tdap) 08/15/2027    Pneumococcal 0-64 years Vaccine  Completed    HIV screen  Completed    Hepatitis A vaccine  Aged Out    Hib vaccine  Aged Out    Meningococcal (ACWY) vaccine  Aged Out       Hemoglobin A1C (%)   Date Value   12/21/2021 11.6   08/17/2021 10.6   05/20/2021 11.3             ( goal A1C is < 7)   No components found for: LABMICR  LDL Cholesterol (mg/dL)   Date Value   05/20/2021 131 (H)   12/05/2019 130       (goal LDL is <100)   AST (U/L)   Date Value   04/07/2019 71 (H)     ALT (U/L)   Date Value   04/07/2019 98 (H)     BUN (mg/dL)   Date Value   05/20/2021 9     BP Readings from Last 3 Encounters:   12/21/21 (!) 158/88   12/07/21 (!) 156/96   11/05/21 138/78          (goal 120/80)    All Future Testing planned in [facility]  Lab Frequency Next Occurrence   Hemoglobin A1C Once 10/25/2022   Lipid Panel Once 10/25/2022               Patient Active Problem List:     Type 2 diabetes mellitus without complication, with long-term current use of insulin ([address])     Essential hypertension     Morbid obesity ([address])     Scalp lesion     Erectile dysfunction     Morbid obesity with BMI of 45.0-49.9, adult ([address])     Chronic low back pain

## 2023-09-07 RX ORDER — INSULIN GLARGINE 100 [IU]/ML
INJECTION, SOLUTION SUBCUTANEOUS
OUTPATIENT
Start: 2023-09-07

## 2023-09-19 DIAGNOSIS — M54.50 CHRONIC LOW BACK PAIN, UNSPECIFIED BACK PAIN LATERALITY, UNSPECIFIED WHETHER SCIATICA PRESENT: ICD-10-CM

## 2023-09-19 DIAGNOSIS — G89.29 CHRONIC LOW BACK PAIN, UNSPECIFIED BACK PAIN LATERALITY, UNSPECIFIED WHETHER SCIATICA PRESENT: ICD-10-CM

## 2023-09-19 RX ORDER — NAPROXEN 500 MG/1
500 TABLET ORAL 2 TIMES DAILY WITH MEALS
Qty: 60 TABLET | Refills: 0 | Status: SHIPPED | OUTPATIENT
Start: 2023-09-19

## 2023-09-19 NOTE — TELEPHONE ENCOUNTER
Last visit: 2/8/23  Last Med refill: 5/31/23  Does patient have enough medication for 72 hours: No: no    Next Visit Date:  Future Appointments   Date Time Provider [address]   10/3/2023  9:30 AM Carole Ponce  Academy Zimmerman,[facility] Maintenance   Topic Date Due    COVID-19 Vaccine (1) Never done    Varicella vaccine (1 of 2 - 2-dose childhood series) Never done    Hepatitis C screen  Never done    Pneumococcal 0-64 years Vaccine (2 - PCV) 04/28/2017    Hepatitis B vaccine (2 of 3 - 19+ 3-dose series) 01/18/2022    Diabetic retinal exam  04/13/2022    Diabetic Alb to Cr ratio (uACR) test  05/20/2022    Lipids  05/20/2022    GFR test (Diabetes, CKD 3-4, OR last GFR 15-59)  05/20/2022    Diabetic foot exam  08/17/2022    A1C test (Diabetic or Prediabetic)  12/21/2022    Flu vaccine (1) 08/01/2023    Depression Screen  02/08/2024    DTaP/Tdap/Td vaccine (2 - Td or Tdap) 08/15/2027    HIV screen  Completed    Hepatitis A vaccine  Aged Out    Hib vaccine  Aged Out    HPV vaccine  Aged Out    Meningococcal (ACWY) vaccine  Aged Out       Hemoglobin A1C (%)   Date Value   12/21/2021 11.6   08/17/2021 10.6   05/20/2021 11.3             ( goal A1C is < 7)   No components found for: \"LABMICR\"  LDL Cholesterol (mg/dL)   Date Value   05/20/2021 131 (H)   12/05/2019 130       (goal LDL is <100)   AST (U/L)   Date Value   04/07/2019 71 (H)     ALT (U/L)   Date Value   04/07/2019 98 (H)     BUN (mg/dL)   Date Value   05/20/2021 9     BP Readings from Last 3 Encounters:   12/21/21 (!) 158/88   12/07/21 (!) 156/96   11/05/21 138/78          (goal 120/80)    All Future Testing planned in [facility]  Lab Frequency Next Occurrence   Hemoglobin A1C Once 10/25/2022   Lipid Panel Once 10/25/2022               Patient Active Problem List:     Type 2 diabetes mellitus without complication, with long-term current use of insulin (HCC)     Essential hypertension     Morbid obesity (HCC)     Scalp lesion     Erectile

## 2024-02-05 NOTE — TELEPHONE ENCOUNTER
[facility] pharmacy on [address] sent over RX refill for lisinopril-HCTZ but is not listed as the pharmacy for patient. Writer attempted to reach patient [phone], this number is not in service. Writer removed number from file. Writer mailed letter for patient to call office to schedule appointment.

## 2024-03-25 ENCOUNTER — OFFICE VISIT (OUTPATIENT)
Dept: FAMILY MEDICINE CLINIC | Age: 44
End: 2024-03-25
Payer: COMMERCIAL

## 2024-03-25 ENCOUNTER — HOSPITAL ENCOUNTER (OUTPATIENT)
Age: 44
Setting detail: SPECIMEN
Discharge: HOME | End: 2024-03-25

## 2024-03-25 VITALS
WEIGHT: 284 LBS | HEART RATE: 90 BPM | SYSTOLIC BLOOD PRESSURE: 187 MMHG | BODY MASS INDEX: 44.57 KG/M2 | DIASTOLIC BLOOD PRESSURE: 111 MMHG | HEIGHT: 67 IN

## 2024-03-25 DIAGNOSIS — I10 ESSENTIAL HYPERTENSION: ICD-10-CM

## 2024-03-25 DIAGNOSIS — M54.50 CHRONIC LOW BACK PAIN, UNSPECIFIED BACK PAIN LATERALITY, UNSPECIFIED WHETHER SCIATICA PRESENT: ICD-10-CM

## 2024-03-25 DIAGNOSIS — E11.9 TYPE 2 DIABETES MELLITUS WITHOUT COMPLICATION, WITHOUT LONG-TERM CURRENT USE OF INSULIN (HCC): ICD-10-CM

## 2024-03-25 DIAGNOSIS — Z79.4 TYPE 2 DIABETES MELLITUS WITHOUT COMPLICATION, WITH LONG-TERM CURRENT USE OF INSULIN (HCC): Primary | ICD-10-CM

## 2024-03-25 DIAGNOSIS — G89.29 CHRONIC LOW BACK PAIN, UNSPECIFIED BACK PAIN LATERALITY, UNSPECIFIED WHETHER SCIATICA PRESENT: ICD-10-CM

## 2024-03-25 DIAGNOSIS — Z11.59 ENCOUNTER FOR HEPATITIS C SCREENING TEST FOR LOW RISK PATIENT: ICD-10-CM

## 2024-03-25 DIAGNOSIS — L98.9 SKIN LESION: ICD-10-CM

## 2024-03-25 DIAGNOSIS — E11.9 TYPE 2 DIABETES MELLITUS WITHOUT COMPLICATION, WITH LONG-TERM CURRENT USE OF INSULIN (HCC): ICD-10-CM

## 2024-03-25 DIAGNOSIS — Z79.4 TYPE 2 DIABETES MELLITUS WITHOUT COMPLICATION, WITH LONG-TERM CURRENT USE OF INSULIN (HCC): ICD-10-CM

## 2024-03-25 DIAGNOSIS — E11.9 TYPE 2 DIABETES MELLITUS WITHOUT COMPLICATION, WITH LONG-TERM CURRENT USE OF INSULIN (HCC): Primary | ICD-10-CM

## 2024-03-25 LAB
ALBUMIN SERPL-MCNC: 4.1 G/DL (ref 3.5–5.2)
ALBUMIN/GLOB SERPL: 1 {RATIO} (ref 1–2.5)
ALP SERPL-CCNC: 90 U/L (ref 40–129)
ALT SERPL-CCNC: 26 U/L (ref 10–50)
ANION GAP SERPL CALCULATED.3IONS-SCNC: 10 MMOL/L (ref 9–16)
AST SERPL-CCNC: 27 U/L (ref 10–50)
BASOPHILS # BLD: 0.04 K/UL (ref 0–0.2)
BASOPHILS NFR BLD: 1 % (ref 0–2)
BILIRUB SERPL-MCNC: 0.4 MG/DL (ref 0–1.2)
BUN SERPL-MCNC: 11 MG/DL (ref 6–20)
CALCIUM SERPL-MCNC: 9.3 MG/DL (ref 8.6–10.4)
CHLORIDE SERPL-SCNC: 102 MMOL/L (ref 98–107)
CHOLEST SERPL-MCNC: 218 MG/DL (ref 0–199)
CHOLESTEROL/HDL RATIO: 4
CO2 SERPL-SCNC: 27 MMOL/L (ref 20–31)
CREAT SERPL-MCNC: 1.1 MG/DL (ref 0.7–1.2)
CREAT UR-MCNC: 125 MG/DL (ref 39–259)
EOSINOPHIL # BLD: 0.25 K/UL (ref 0–0.44)
EOSINOPHILS RELATIVE PERCENT: 4 % (ref 1–4)
ERYTHROCYTE [DISTWIDTH] IN BLOOD BY AUTOMATED COUNT: 12.2 % (ref 11.8–14.4)
GFR SERPL CREATININE-BSD FRML MDRD: 87 ML/MIN/1.73M2
GLUCOSE SERPL-MCNC: 309 MG/DL (ref 74–99)
HBA1C MFR BLD: 9.8 %
HCT VFR BLD AUTO: 40.6 % (ref 40.7–50.3)
HCV AB SERPL QL IA: NONREACTIVE
HDLC SERPL-MCNC: 55 MG/DL
HGB BLD-MCNC: 14.3 G/DL (ref 13–17)
IMM GRANULOCYTES # BLD AUTO: 0.04 K/UL (ref 0–0.3)
IMM GRANULOCYTES NFR BLD: 1 %
LDLC SERPL CALC-MCNC: 128 MG/DL (ref 0–100)
LYMPHOCYTES NFR BLD: 2.08 K/UL (ref 1.1–3.7)
LYMPHOCYTES RELATIVE PERCENT: 31 % (ref 24–43)
MCH RBC QN AUTO: 31.6 PG (ref 25.2–33.5)
MCHC RBC AUTO-ENTMCNC: 35.2 G/DL (ref 28.4–34.8)
MCV RBC AUTO: 89.8 FL (ref 82.6–102.9)
MICROALBUMIN UR-MCNC: 1008 MG/L (ref 0–20)
MICROALBUMIN/CREAT UR-RTO: 806 MCG/MG CREAT (ref 0–17)
MONOCYTES NFR BLD: 0.64 K/UL (ref 0.1–1.2)
MONOCYTES NFR BLD: 10 % (ref 3–12)
NEUTROPHILS NFR BLD: 53 % (ref 36–65)
NEUTS SEG NFR BLD: 3.65 K/UL (ref 1.5–8.1)
NRBC BLD-RTO: 0 PER 100 WBC
PLATELET # BLD AUTO: 220 K/UL (ref 138–453)
PMV BLD AUTO: 11.3 FL (ref 8.1–13.5)
POTASSIUM SERPL-SCNC: 3.6 MMOL/L (ref 3.7–5.3)
PROT SERPL-MCNC: 6.9 G/DL (ref 6.6–8.7)
RBC # BLD AUTO: 4.52 M/UL (ref 4.21–5.77)
SODIUM SERPL-SCNC: 139 MMOL/L (ref 136–145)
TRIGL SERPL-MCNC: 175 MG/DL
VLDLC SERPL CALC-MCNC: 35 MG/DL
WBC OTHER # BLD: 6.7 K/UL (ref 3.5–11.3)

## 2024-03-25 PROCEDURE — 99214 OFFICE O/P EST MOD 30 MIN: CPT | Performed by: STUDENT IN AN ORGANIZED HEALTH CARE EDUCATION/TRAINING PROGRAM

## 2024-03-25 PROCEDURE — 3080F DIAST BP >= 90 MM HG: CPT | Performed by: STUDENT IN AN ORGANIZED HEALTH CARE EDUCATION/TRAINING PROGRAM

## 2024-03-25 PROCEDURE — 83036 HEMOGLOBIN GLYCOSYLATED A1C: CPT | Performed by: STUDENT IN AN ORGANIZED HEALTH CARE EDUCATION/TRAINING PROGRAM

## 2024-03-25 PROCEDURE — 3077F SYST BP >= 140 MM HG: CPT | Performed by: STUDENT IN AN ORGANIZED HEALTH CARE EDUCATION/TRAINING PROGRAM

## 2024-03-25 PROCEDURE — 3046F HEMOGLOBIN A1C LEVEL >9.0%: CPT | Performed by: STUDENT IN AN ORGANIZED HEALTH CARE EDUCATION/TRAINING PROGRAM

## 2024-03-25 RX ORDER — BLOOD PRESSURE TEST KIT
1 KIT MISCELLANEOUS DAILY
Qty: 1 KIT | Refills: 0 | Status: SHIPPED | OUTPATIENT
Start: 2024-03-25

## 2024-03-25 RX ORDER — CLOTRIMAZOLE 1 %
CREAM (GRAM) TOPICAL
Qty: 1 EACH | Refills: 1 | Status: SHIPPED | OUTPATIENT
Start: 2024-03-25 | End: 2024-04-01

## 2024-03-25 RX ORDER — INSULIN GLARGINE 100 [IU]/ML
INJECTION, SOLUTION SUBCUTANEOUS
Qty: 75 ML | Refills: 3 | Status: SHIPPED | OUTPATIENT
Start: 2024-03-25

## 2024-03-25 RX ORDER — ATORVASTATIN CALCIUM 40 MG/1
40 TABLET, FILM COATED ORAL DAILY
Qty: 30 TABLET | Refills: 3 | Status: SHIPPED | OUTPATIENT
Start: 2024-03-25

## 2024-03-25 RX ORDER — MELOXICAM 15 MG/1
15 TABLET ORAL DAILY
Qty: 30 TABLET | Refills: 3 | Status: SHIPPED | OUTPATIENT
Start: 2024-03-25

## 2024-03-25 RX ORDER — UBIQUINOL 100 MG
1 CAPSULE ORAL 3 TIMES DAILY
Qty: 100 EACH | Refills: 1 | Status: SHIPPED | OUTPATIENT
Start: 2024-03-25

## 2024-03-25 RX ORDER — ACETAMINOPHEN 500 MG
1000 TABLET ORAL 3 TIMES DAILY PRN
Qty: 180 TABLET | Refills: 0 | Status: SHIPPED | OUTPATIENT
Start: 2024-03-25

## 2024-03-25 RX ORDER — FLURBIPROFEN SODIUM 0.3 MG/ML
SOLUTION/ DROPS OPHTHALMIC
Qty: 100 EACH | Refills: 3 | Status: SHIPPED | OUTPATIENT
Start: 2024-03-25

## 2024-03-25 RX ORDER — LISINOPRIL AND HYDROCHLOROTHIAZIDE 20; 12.5 MG/1; MG/1
2 TABLET ORAL DAILY
Qty: 60 TABLET | Refills: 3 | Status: SHIPPED | OUTPATIENT
Start: 2024-03-25

## 2024-03-25 SDOH — ECONOMIC STABILITY: FOOD INSECURITY: WITHIN THE PAST 12 MONTHS, YOU WORRIED THAT YOUR FOOD WOULD RUN OUT BEFORE YOU GOT MONEY TO BUY MORE.: NEVER TRUE

## 2024-03-25 SDOH — ECONOMIC STABILITY: FOOD INSECURITY: WITHIN THE PAST 12 MONTHS, THE FOOD YOU BOUGHT JUST DIDN'T LAST AND YOU DIDN'T HAVE MONEY TO GET MORE.: NEVER TRUE

## 2024-03-25 SDOH — ECONOMIC STABILITY: HOUSING INSECURITY
IN THE LAST 12 MONTHS, WAS THERE A TIME WHEN YOU DID NOT HAVE A STEADY PLACE TO SLEEP OR SLEPT IN A SHELTER (INCLUDING NOW)?: NO

## 2024-03-25 SDOH — ECONOMIC STABILITY: INCOME INSECURITY: HOW HARD IS IT FOR YOU TO PAY FOR THE VERY BASICS LIKE FOOD, HOUSING, MEDICAL CARE, AND HEATING?: NOT VERY HARD

## 2024-03-25 ASSESSMENT — ENCOUNTER SYMPTOMS
EYE DISCHARGE: 0
COUGH: 0
COLOR CHANGE: 0
SINUS PAIN: 0
ABDOMINAL PAIN: 0
EYE PAIN: 0
CONSTIPATION: 0
SHORTNESS OF BREATH: 0
CHEST TIGHTNESS: 0
SINUS PRESSURE: 0
NAUSEA: 0
VOMITING: 0
BACK PAIN: 0
DIARRHEA: 0
ABDOMINAL DISTENTION: 0

## 2024-03-25 ASSESSMENT — PATIENT HEALTH QUESTIONNAIRE - PHQ9
SUM OF ALL RESPONSES TO PHQ QUESTIONS 1-9: 0
SUM OF ALL RESPONSES TO PHQ QUESTIONS 1-9: 0
1. LITTLE INTEREST OR PLEASURE IN DOING THINGS: NOT AT ALL
SUM OF ALL RESPONSES TO PHQ9 QUESTIONS 1 & 2: 0
2. FEELING DOWN, DEPRESSED OR HOPELESS: NOT AT ALL
SUM OF ALL RESPONSES TO PHQ QUESTIONS 1-9: 0
SUM OF ALL RESPONSES TO PHQ QUESTIONS 1-9: 0

## 2024-03-25 NOTE — PROGRESS NOTES
Visits Requested:   1    POCT glycosylated hemoglobin (Hb A1C)    HM DIABETES FOOT EXAM     DIABETES EYE EXAM         There are no Patient Instructions on file for this visit.    Electronically signed by Bijan Guillermo DNP,APRN,CNP  on 3/25/2024 at 1:09 PM           Completed Refills   Requested Prescriptions     Signed Prescriptions Disp Refills    insulin glargine (BASAGLAR KWIKPEN) 100 UNIT/ML injection pen 75 mL 3     Sig: INJECT 80 UNITS INTO THE SKIN NIGHTLY    atorvastatin (LIPITOR) 40 MG tablet 30 tablet 3     Sig: Take 1 tablet by mouth daily    lisinopril-hydroCHLOROthiazide (PRINZIDE;ZESTORETIC) 20-12.5 MG per tablet 60 tablet 3     Sig: Take 2 tablets by mouth daily    SITagliptin (JANUVIA) 100 MG tablet 90 tablet 1     Sig: take 1 tablet by mouth once daily    Insulin Pen Needle (B-D UF III MINI PEN NEEDLES) 31G X 5 MM MISC 100 each 3     Sig: USE ONCE DAILY    blood glucose monitor kit and supplies 1 kit 0     Sig: Dispense sufficient amount for indicated testing frequency plus additional to accommodate PRN testing needs. Dispense all needed supplies to include: monitor, strips, lancing device, lancets, control solutions, alcohol swabs.    meloxicam (MOBIC) 15 MG tablet 30 tablet 3     Sig: Take 1 tablet by mouth daily    diclofenac sodium (VOLTAREN) 1 % GEL 1 g 1     Sig: Apply 2 g topically 4 times daily as needed for Pain    acetaminophen (TYLENOL) 500 MG tablet 180 tablet 0     Sig: Take 2 tablets by mouth 3 times daily as needed for Pain    Alcohol Swabs (ALCOHOL PREP) 70 % PADS 100 each 1     Si each by Does not apply route 3 times daily    clotrimazole (LOTRIMIN AF) 1 % cream 1 each 1     Sig: Apply topically 2 times daily.    Blood Pressure KIT 1 kit 0     Si kit by Does not apply route Daily

## 2024-03-26 ENCOUNTER — TELEPHONE (OUTPATIENT)
Dept: FAMILY MEDICINE CLINIC | Age: 44
End: 2024-03-26

## 2024-03-26 DIAGNOSIS — I10 ESSENTIAL HYPERTENSION: Primary | ICD-10-CM

## 2024-03-26 NOTE — TELEPHONE ENCOUNTER
Per [facility] the BP Cuff that was ordered is on back order and they can't bill it under insurance. Can you please change it to a DME Order. Please and Thank you

## 2024-05-27 ENCOUNTER — APPOINTMENT (OUTPATIENT)
Dept: GENERAL RADIOLOGY | Age: 44
End: 2024-05-27

## 2024-05-27 ENCOUNTER — HOSPITAL ENCOUNTER (EMERGENCY)
Age: 44
Discharge: HOME | End: 2024-05-27
Attending: EMERGENCY MEDICINE

## 2024-05-27 VITALS
BODY MASS INDEX: 45.83 KG/M2 | SYSTOLIC BLOOD PRESSURE: 171 MMHG | HEART RATE: 96 BPM | WEIGHT: 292 LBS | DIASTOLIC BLOOD PRESSURE: 84 MMHG | RESPIRATION RATE: 18 BRPM | HEIGHT: 67 IN | OXYGEN SATURATION: 97 % | TEMPERATURE: 98.5 F

## 2024-05-27 DIAGNOSIS — M25.511 ACUTE PAIN OF RIGHT SHOULDER: Primary | ICD-10-CM

## 2024-05-27 PROCEDURE — 99283 EMERGENCY DEPT VISIT LOW MDM: CPT

## 2024-05-27 PROCEDURE — 6370000000 HC RX 637 (ALT 250 FOR IP): Performed by: EMERGENCY MEDICINE

## 2024-05-27 PROCEDURE — 73030 X-RAY EXAM OF SHOULDER: CPT

## 2024-05-27 RX ORDER — IBUPROFEN 800 MG/1
800 TABLET ORAL 2 TIMES DAILY PRN
Qty: 60 TABLET | Refills: 5 | Status: SHIPPED | OUTPATIENT
Start: 2024-05-27

## 2024-05-27 RX ORDER — ACETAMINOPHEN 500 MG
1000 TABLET ORAL ONCE
Status: COMPLETED | OUTPATIENT
Start: 2024-05-27 | End: 2024-05-27

## 2024-05-27 RX ORDER — ACETAMINOPHEN 500 MG
500 TABLET ORAL 4 TIMES DAILY PRN
Qty: 360 TABLET | Refills: 1 | Status: SHIPPED | OUTPATIENT
Start: 2024-05-27

## 2024-05-27 RX ORDER — CYCLOBENZAPRINE HCL 10 MG
10 TABLET ORAL ONCE
Status: COMPLETED | OUTPATIENT
Start: 2024-05-27 | End: 2024-05-27

## 2024-05-27 RX ORDER — CYCLOBENZAPRINE HCL 10 MG
10 TABLET ORAL NIGHTLY PRN
Qty: 10 TABLET | Refills: 0 | Status: SHIPPED | OUTPATIENT
Start: 2024-05-27 | End: 2024-06-06

## 2024-05-27 RX ADMIN — ACETAMINOPHEN 1000 MG: 500 TABLET ORAL at 02:44

## 2024-05-27 RX ADMIN — CYCLOBENZAPRINE 10 MG: 10 TABLET, FILM COATED ORAL at 02:44

## 2024-05-27 ASSESSMENT — PAIN DESCRIPTION - ORIENTATION: ORIENTATION: RIGHT

## 2024-05-27 ASSESSMENT — PAIN DESCRIPTION - LOCATION: LOCATION: SHOULDER

## 2024-05-27 ASSESSMENT — PAIN SCALES - GENERAL: PAINLEVEL_OUTOF10: 10

## 2024-05-27 ASSESSMENT — PAIN - FUNCTIONAL ASSESSMENT: PAIN_FUNCTIONAL_ASSESSMENT: 0-10

## 2024-05-27 NOTE — ED PROVIDER NOTES
Muscle spasms     Dispense:  10 tablet     Refill:  0     DISCHARGE PRESCRIPTIONS:  Discharge Medication List as of 5/27/2024  2:23 AM        START taking these medications    Details   ibuprofen (ADVIL;MOTRIN) 800 MG tablet Take 1 tablet by mouth 2 times daily as needed for Pain, Disp-60 tablet, R-5Normal      cyclobenzaprine (FLEXERIL) 10 MG tablet Take 1 tablet by mouth nightly as needed for Muscle spasms, Disp-10 tablet, R-0Normal           PHYSICIAN CONSULTS ORDERED THIS ENCOUNTER:  None  FINAL IMPRESSION      1. Acute pain of right shoulder          DISPOSITION/PLAN   DISPOSITION Decision To Discharge 05/27/2024 02:21:55 AM      OUTPATIENT FOLLOW UP THE PATIENT:  Toan Nicolas, DO  [address] [facility] [address] [phone]    Schedule an appointment as soon as possible for a visit in 1 week        Erica B Goldberger, MD Goldberger, Erica B, MD  05/27/24 [id number]

## 2024-07-19 ENCOUNTER — OFFICE VISIT (OUTPATIENT)
Dept: FAMILY MEDICINE CLINIC | Age: 44
End: 2024-07-19
Payer: COMMERCIAL

## 2024-07-19 ENCOUNTER — HOSPITAL ENCOUNTER (OUTPATIENT)
Age: 44
Setting detail: SPECIMEN
Discharge: HOME | End: 2024-07-19

## 2024-07-19 VITALS
HEIGHT: 67 IN | OXYGEN SATURATION: 94 % | BODY MASS INDEX: 44.64 KG/M2 | DIASTOLIC BLOOD PRESSURE: 89 MMHG | HEART RATE: 90 BPM | WEIGHT: 284.4 LBS | SYSTOLIC BLOOD PRESSURE: 162 MMHG

## 2024-07-19 DIAGNOSIS — R06.83 SNORING: ICD-10-CM

## 2024-07-19 DIAGNOSIS — E11.9 TYPE 2 DIABETES MELLITUS WITHOUT COMPLICATION, WITH LONG-TERM CURRENT USE OF INSULIN (HCC): Primary | ICD-10-CM

## 2024-07-19 DIAGNOSIS — Z79.4 TYPE 2 DIABETES MELLITUS WITHOUT COMPLICATION, WITH LONG-TERM CURRENT USE OF INSULIN (HCC): Primary | ICD-10-CM

## 2024-07-19 DIAGNOSIS — E11.9 TYPE 2 DIABETES MELLITUS WITHOUT COMPLICATION, WITH LONG-TERM CURRENT USE OF INSULIN (HCC): ICD-10-CM

## 2024-07-19 DIAGNOSIS — I10 ESSENTIAL HYPERTENSION: ICD-10-CM

## 2024-07-19 DIAGNOSIS — E78.5 HYPERLIPIDEMIA, UNSPECIFIED HYPERLIPIDEMIA TYPE: ICD-10-CM

## 2024-07-19 DIAGNOSIS — G47.36 SLEEP RELATED HYPOVENTILATION IN CONDITIONS CLASSIFIED ELSEWHERE: ICD-10-CM

## 2024-07-19 DIAGNOSIS — Z79.4 TYPE 2 DIABETES MELLITUS WITHOUT COMPLICATION, WITH LONG-TERM CURRENT USE OF INSULIN (HCC): ICD-10-CM

## 2024-07-19 DIAGNOSIS — E66.01 OBESITY, CLASS III, BMI 40-49.9 (MORBID OBESITY) (HCC): ICD-10-CM

## 2024-07-19 DIAGNOSIS — N52.9 ERECTILE DYSFUNCTION, UNSPECIFIED ERECTILE DYSFUNCTION TYPE: ICD-10-CM

## 2024-07-19 LAB
ANION GAP SERPL CALCULATED.3IONS-SCNC: 11 MMOL/L (ref 9–16)
BUN SERPL-MCNC: 12 MG/DL (ref 6–20)
CALCIUM SERPL-MCNC: 8.9 MG/DL (ref 8.6–10.4)
CHLORIDE SERPL-SCNC: 104 MMOL/L (ref 98–107)
CHOLEST SERPL-MCNC: 206 MG/DL (ref 0–199)
CHOLESTEROL/HDL RATIO: 4
CO2 SERPL-SCNC: 28 MMOL/L (ref 20–31)
CREAT SERPL-MCNC: 1.2 MG/DL (ref 0.7–1.2)
GFR, ESTIMATED: 80 ML/MIN/1.73M2
GLUCOSE SERPL-MCNC: 134 MG/DL (ref 74–99)
HBA1C MFR BLD: 9.1 %
HDLC SERPL-MCNC: 53 MG/DL
LDLC SERPL CALC-MCNC: 129 MG/DL (ref 0–100)
POTASSIUM SERPL-SCNC: 3.2 MMOL/L (ref 3.7–5.3)
SODIUM SERPL-SCNC: 143 MMOL/L (ref 136–145)
TRIGL SERPL-MCNC: 121 MG/DL
VLDLC SERPL CALC-MCNC: 24 MG/DL

## 2024-07-19 PROCEDURE — 3079F DIAST BP 80-89 MM HG: CPT | Performed by: STUDENT IN AN ORGANIZED HEALTH CARE EDUCATION/TRAINING PROGRAM

## 2024-07-19 PROCEDURE — 3046F HEMOGLOBIN A1C LEVEL >9.0%: CPT | Performed by: STUDENT IN AN ORGANIZED HEALTH CARE EDUCATION/TRAINING PROGRAM

## 2024-07-19 PROCEDURE — 83036 HEMOGLOBIN GLYCOSYLATED A1C: CPT | Performed by: STUDENT IN AN ORGANIZED HEALTH CARE EDUCATION/TRAINING PROGRAM

## 2024-07-19 PROCEDURE — 3077F SYST BP >= 140 MM HG: CPT | Performed by: STUDENT IN AN ORGANIZED HEALTH CARE EDUCATION/TRAINING PROGRAM

## 2024-07-19 PROCEDURE — 99214 OFFICE O/P EST MOD 30 MIN: CPT | Performed by: STUDENT IN AN ORGANIZED HEALTH CARE EDUCATION/TRAINING PROGRAM

## 2024-07-19 RX ORDER — LISINOPRIL 40 MG/1
40 TABLET ORAL DAILY
Qty: 90 TABLET | Refills: 0 | Status: SHIPPED | OUTPATIENT
Start: 2024-07-19

## 2024-07-19 RX ORDER — TIRZEPATIDE 2.5 MG/.5ML
2.5 INJECTION, SOLUTION SUBCUTANEOUS WEEKLY
Qty: 4 EACH | Refills: 3 | Status: SHIPPED | OUTPATIENT
Start: 2024-07-19

## 2024-07-19 RX ORDER — TADALAFIL 10 MG/1
10 TABLET ORAL DAILY PRN
Qty: 10 TABLET | Refills: 0 | Status: SHIPPED | OUTPATIENT
Start: 2024-07-19

## 2024-07-19 RX ORDER — ATORVASTATIN CALCIUM 40 MG/1
40 TABLET, FILM COATED ORAL DAILY
Qty: 30 TABLET | Refills: 3 | Status: SHIPPED | OUTPATIENT
Start: 2024-07-19

## 2024-07-19 NOTE — PATIENT INSTRUCTIONS
Thank you for letting us take care of you today. We hope all your questions were addressed. If a question was overlooked or something else comes to mind after you return home, please contact a member of your Care Team listed below.        Your Care Team at [facility] is Team #4  Toan Nicolas M.D. (Faculty)  Elmer Maria M.D. (Resident)  Kayden Calhoun M.D.  (Resident)  Grey Bernal M.D. (Resident)  Lobo Rivers M.D. (Resident)  Contreras Amador, MYRNA Peterson, MYRNA Almonte, [facility]  Catherine Pollard, [facility]  Jill Barros, [facility]  Karena Sheriff, MYRNA Lagunas, MYRNA Lopes (LJ) [facility] (Clinical Practice Manager)  Nerissa Glaser [facility] (Clinical Pharmacist)       Office phone number: [phone]    If you need to get in right away due to illness, please be advised we have \"Same Day\" appointments available Monday-Friday. Please call us at [phone] option #3 to schedule your \"Same Day\" appointment.

## 2024-07-19 NOTE — PROGRESS NOTES
[facility]  [facility]  [address]  [place] [address]     Date of Visit:  2024  Patient Name: Ryder Orellana   Patient :  1980       Ryder Orellana is a 44 y.o. male who presents today for an general visit to be evaluated for the following condition(s):  Chief Complaint   Patient presents with    Diabetes    Hypertension     Follow up        HPI     This is a 44 year old male presenting for follow up. He has Hx of DM type II- not well controlled on current regimen. A1c has slightly improved to 9.1 from 9.8 about 3 months ago. Not tolerating metformin. He also complains of continuous erectile dysfunction for which he has been treated for in the past. He complains of snoring and headaches with elevated BP usually in the AM only.       Review of Systems:  Review of Systems   Constitutional:  Negative for activity change, appetite change, chills and diaphoresis.   HENT:  Negative for congestion, ear discharge, ear pain, sinus pressure, sinus pain and sneezing.    Eyes:  Negative for pain and discharge.   Respiratory:  Negative for cough, chest tightness and shortness of breath.    Gastrointestinal:  Negative for abdominal distention, abdominal pain, constipation, diarrhea, nausea and vomiting.   Endocrine: Negative for cold intolerance and heat intolerance.   Genitourinary:  Negative for dysuria, flank pain and hematuria.   Musculoskeletal:  Negative for arthralgias, back pain, joint swelling and myalgias.   Skin:  Negative for color change, pallor, rash and wound.   Neurological:  Negative for dizziness, seizures, numbness and headaches.   Psychiatric/Behavioral:  Negative for agitation, behavioral problems, confusion, dysphoric mood and sleep disturbance.        Prior to Admission medications    Medication Sig Start Date End Date Taking? Authorizing Provider   tadalafil (CIALIS) 10 MG tablet Take 1 tablet by mouth daily as needed for Erectile Dysfunction 24  Yes 
Vaccine (1) 03/25/2025 (Originally 1980)    Flu vaccine (1) 08/01/2024    Diabetic foot exam  03/25/2025    Diabetic Alb to Cr ratio (uACR) test  03/25/2025    Diabetic retinal exam  03/25/2025    Lipids  03/25/2025    Depression Screen  03/25/2025    GFR test (Diabetes, CKD 3-4, OR last GFR 15-59)  03/25/2025    DTaP/Tdap/Td vaccine (2 - Td or Tdap) 08/15/2027    Hepatitis C screen  Completed    HIV screen  Completed    Hepatitis A vaccine  Aged Out    Hib vaccine  Aged Out    HPV vaccine  Aged Out    Polio vaccine  Aged Out    Meningococcal (ACWY) vaccine  Aged Out

## 2024-07-20 ASSESSMENT — ENCOUNTER SYMPTOMS
NAUSEA: 0
ABDOMINAL PAIN: 0
COUGH: 0
ABDOMINAL DISTENTION: 0
SHORTNESS OF BREATH: 0
VOMITING: 0
DIARRHEA: 0
CHEST TIGHTNESS: 0
BACK PAIN: 0
SINUS PRESSURE: 0
COLOR CHANGE: 0
EYE PAIN: 0
SINUS PAIN: 0
CONSTIPATION: 0
EYE DISCHARGE: 0

## 2024-10-07 DIAGNOSIS — M54.50 LOW BACK PAIN, UNSPECIFIED: ICD-10-CM

## 2024-10-07 DIAGNOSIS — E11.9 TYPE 2 DIABETES MELLITUS WITHOUT COMPLICATION, WITH LONG-TERM CURRENT USE OF INSULIN (HCC): ICD-10-CM

## 2024-10-07 DIAGNOSIS — Z79.4 TYPE 2 DIABETES MELLITUS WITHOUT COMPLICATION, WITH LONG-TERM CURRENT USE OF INSULIN (HCC): ICD-10-CM

## 2024-10-07 DIAGNOSIS — I10 ESSENTIAL HYPERTENSION: ICD-10-CM

## 2024-10-07 RX ORDER — PSEUDOEPHED/ACETAMINOPH/DIPHEN 30MG-500MG
2 TABLET ORAL 3 TIMES DAILY PRN
Qty: 180 TABLET | Refills: 5 | Status: SHIPPED | OUTPATIENT
Start: 2024-10-07

## 2024-10-07 RX ORDER — LISINOPRIL 40 MG/1
40 TABLET ORAL DAILY
Qty: 90 TABLET | Refills: 0 | Status: SHIPPED | OUTPATIENT
Start: 2024-10-07

## 2024-10-07 RX ORDER — ATORVASTATIN CALCIUM 40 MG/1
40 TABLET, FILM COATED ORAL DAILY
Qty: 90 TABLET | Refills: 1 | Status: SHIPPED | OUTPATIENT
Start: 2024-10-07

## 2024-10-07 NOTE — TELEPHONE ENCOUNTER
E-scribe request for pended medciations. Please review and e-scribe if applicable.     Last Visit Date:  7/19/24  Next Visit Date:  Visit date not found    Hemoglobin A1C (%)   Date Value   07/19/2024 9.1   03/25/2024 9.8   12/21/2021 11.6             ( goal A1C is < 7)   No components found for: \"LABMICR\"  No components found for: \"LDLCHOLESTEROL\", \"LDLCALC\"    (goal LDL is <100)   AST (U/L)   Date Value   03/25/2024 27     ALT (U/L)   Date Value   03/25/2024 26     BUN (mg/dL)   Date Value   07/19/2024 12     BP Readings from Last 3 Encounters:   07/19/24 (!) 162/89   05/27/24 (!) 171/84   03/25/24 (!) 187/111          (goal 120/80)        Patient Active Problem List:     Type 2 diabetes mellitus without complication, with long-term current use of insulin (HCC)     Essential hypertension     Morbid obesity     Scalp lesion     Erectile dysfunction     Morbid obesity with BMI of 45.0-49.9, adult     Chronic low back pain      ----JF

## 2024-10-08 ENCOUNTER — TELEPHONE (OUTPATIENT)
Dept: FAMILY MEDICINE CLINIC | Age: 44
End: 2024-10-08

## 2024-10-08 NOTE — TELEPHONE ENCOUNTER
Writer received a PA for Moun[employer]. Per last process of this medication the PA was denied as needed proof patient that patient has an A1c greater than 6.5 and a type 2 Diabetic. Which patient fits the criteria. Writer checked with manager to see fi can upload to cover my meds. Per manager will look further into this matter but could fax to the appeal department. Writer called patient to get NIURKA informing could forward this information to the appeal department. While speaking to patient he has seen a new PCP at [facility], Dr. Haywood. Writer asked if patient is going to follow care with our office or theirs, per patient he will stay with Dr. Brooks. No longer needs this medication as Dr. Brooks has update the RX to 5.0 MG today.       Denial reason per insurance:    Per your health plan's criteria, this drug is covered if you meet the following: (1) You have a disease of high blood sugar (type 2 diabetes) as confirmed by one of the following: (A) For ongoing drug treatment for your disease of high blood sugar levels (type 2 diabetes), your doctor must provide medical records (for example: chart notes) showing that you have the disease. (B) Your doctor provides medical records (for example: chart notes) showing that you have a disease of high blood sugar levels (type 2 diabetes) as seen by one of the following laboratory values: (I) Your blood sugar level (hemoglobin A1c) is greater than or equal to 6.5%. (II) Your blood sugar level before eating/drinking (fasting plasma glucose) is greater than or equal to 126mg/dL. (III) Your blood sugar level after drinking (2-hour plasma glucose during oral glucose tolerance test) is greater than or equal to 200mg/dL. (IV) Random blood sugar level (random plasma glucose) is greater than or equal to 200mg/dL when you have classic symptoms of high blood sugar levels (hyperglycemia or hyperglycemic crisis). The information provided does not show that you meet the criteria

## 2024-11-04 PROCEDURE — 99285 EMERGENCY DEPT VISIT HI MDM: CPT

## 2024-11-04 PROCEDURE — 96360 HYDRATION IV INFUSION INIT: CPT

## 2024-11-04 PROCEDURE — 96361 HYDRATE IV INFUSION ADD-ON: CPT

## 2024-11-05 ENCOUNTER — HOSPITAL ENCOUNTER (EMERGENCY)
Age: 44
Discharge: HOME | End: 2024-11-05
Attending: EMERGENCY MEDICINE
Payer: COMMERCIAL

## 2024-11-05 ENCOUNTER — APPOINTMENT (OUTPATIENT)
Dept: CT IMAGING | Age: 44
End: 2024-11-05
Payer: COMMERCIAL

## 2024-11-05 ENCOUNTER — APPOINTMENT (OUTPATIENT)
Dept: GENERAL RADIOLOGY | Age: 44
End: 2024-11-05
Payer: COMMERCIAL

## 2024-11-05 VITALS
RESPIRATION RATE: 18 BRPM | HEIGHT: 67 IN | TEMPERATURE: 98.6 F | SYSTOLIC BLOOD PRESSURE: 164 MMHG | WEIGHT: 280 LBS | BODY MASS INDEX: 43.95 KG/M2 | HEART RATE: 97 BPM | DIASTOLIC BLOOD PRESSURE: 94 MMHG | OXYGEN SATURATION: 96 %

## 2024-11-05 DIAGNOSIS — R73.9 HYPERGLYCEMIA: ICD-10-CM

## 2024-11-05 DIAGNOSIS — E87.6 HYPOKALEMIA: ICD-10-CM

## 2024-11-05 DIAGNOSIS — R42 DIZZINESS: Primary | ICD-10-CM

## 2024-11-05 DIAGNOSIS — N17.9 AKI (ACUTE KIDNEY INJURY) (HCC): ICD-10-CM

## 2024-11-05 LAB
ANION GAP SERPL CALCULATED.3IONS-SCNC: 14 MMOL/L (ref 9–17)
BACTERIA URNS QL MICRO: ABNORMAL
BASOPHILS # BLD: 0.05 K/UL (ref 0–0.2)
BASOPHILS NFR BLD: 0 % (ref 0–2)
BILIRUB UR QL STRIP: NEGATIVE
BUN SERPL-MCNC: 22 MG/DL (ref 6–20)
BUN/CREAT SERPL: 12 (ref 9–20)
CALCIUM SERPL-MCNC: 9.2 MG/DL (ref 8.6–10.4)
CASTS #/AREA URNS LPF: ABNORMAL /LPF
CASTS #/AREA URNS LPF: ABNORMAL /LPF
CHLORIDE SERPL-SCNC: 97 MMOL/L (ref 98–107)
CLARITY UR: CLEAR
CO2 SERPL-SCNC: 24 MMOL/L (ref 20–31)
COLOR UR: YELLOW
CREAT SERPL-MCNC: 1.9 MG/DL (ref 0.7–1.2)
EOSINOPHIL # BLD: 0.16 K/UL (ref 0–0.44)
EOSINOPHILS RELATIVE PERCENT: 1 % (ref 1–4)
EPI CELLS #/AREA URNS HPF: ABNORMAL /HPF (ref 0–5)
ERYTHROCYTE [DISTWIDTH] IN BLOOD BY AUTOMATED COUNT: 11.9 % (ref 11.8–14.4)
GFR, ESTIMATED: 44 ML/MIN/1.73M2
GLUCOSE BLD-MCNC: 296 MG/DL (ref 75–110)
GLUCOSE SERPL-MCNC: 335 MG/DL (ref 70–99)
GLUCOSE UR STRIP-MCNC: ABNORMAL MG/DL
HCT VFR BLD AUTO: 38.6 % (ref 40.7–50.3)
HGB BLD-MCNC: 13.8 G/DL (ref 13–17)
HGB UR QL STRIP.AUTO: ABNORMAL
IMM GRANULOCYTES # BLD AUTO: 0.04 K/UL (ref 0–0.3)
IMM GRANULOCYTES NFR BLD: 0 %
INR PPP: 0.9
KETONES UR STRIP-MCNC: NEGATIVE MG/DL
LEUKOCYTE ESTERASE UR QL STRIP: NEGATIVE
LYMPHOCYTES NFR BLD: 2.88 K/UL (ref 1.1–3.7)
LYMPHOCYTES RELATIVE PERCENT: 26 % (ref 24–43)
MAGNESIUM SERPL-MCNC: 1.6 MG/DL (ref 1.6–2.6)
MCH RBC QN AUTO: 31.5 PG (ref 25.2–33.5)
MCHC RBC AUTO-ENTMCNC: 35.8 G/DL (ref 28.4–34.8)
MCV RBC AUTO: 88.1 FL (ref 82.6–102.9)
MONOCYTES NFR BLD: 0.83 K/UL (ref 0.1–1.2)
MONOCYTES NFR BLD: 7 % (ref 3–12)
MUCOUS THREADS URNS QL MICRO: ABNORMAL
NEUTROPHILS NFR BLD: 66 % (ref 36–65)
NEUTS SEG NFR BLD: 7.22 K/UL (ref 1.5–8.1)
NITRITE UR QL STRIP: NEGATIVE
NRBC BLD-RTO: 0 PER 100 WBC
PARTIAL THROMBOPLASTIN TIME: 25.5 SEC (ref 23.9–33.8)
PH UR STRIP: 5.5 [PH] (ref 5–8)
PHOSPHATE SERPL-MCNC: 3.9 MG/DL (ref 2.5–4.5)
PLATELET # BLD AUTO: 258 K/UL (ref 138–453)
PMV BLD AUTO: 10.9 FL (ref 8.1–13.5)
POTASSIUM SERPL-SCNC: 3.3 MMOL/L (ref 3.7–5.3)
PROT UR STRIP-MCNC: ABNORMAL MG/DL
PROTHROMBIN TIME: 12.6 SEC (ref 11.5–14.2)
RBC # BLD AUTO: 4.38 M/UL (ref 4.21–5.77)
RBC #/AREA URNS HPF: ABNORMAL /HPF (ref 0–2)
SODIUM SERPL-SCNC: 135 MMOL/L (ref 135–144)
SP GR UR STRIP: 1.03 (ref 1–1.03)
TROPONIN I SERPL HS-MCNC: 28 NG/L (ref 0–22)
TROPONIN I SERPL HS-MCNC: 28 NG/L (ref 0–22)
UROBILINOGEN UR STRIP-ACNC: ABNORMAL EU/DL (ref 0–1)
WBC #/AREA URNS HPF: ABNORMAL /HPF (ref 0–5)
WBC OTHER # BLD: 11.2 K/UL (ref 3.5–11.3)

## 2024-11-05 PROCEDURE — 93005 ELECTROCARDIOGRAM TRACING: CPT | Performed by: EMERGENCY MEDICINE

## 2024-11-05 PROCEDURE — 85025 COMPLETE CBC W/AUTO DIFF WBC: CPT

## 2024-11-05 PROCEDURE — 96361 HYDRATE IV INFUSION ADD-ON: CPT

## 2024-11-05 PROCEDURE — 83735 ASSAY OF MAGNESIUM: CPT

## 2024-11-05 PROCEDURE — 6370000000 HC RX 637 (ALT 250 FOR IP): Performed by: EMERGENCY MEDICINE

## 2024-11-05 PROCEDURE — 84484 ASSAY OF TROPONIN QUANT: CPT

## 2024-11-05 PROCEDURE — 2580000003 HC RX 258: Performed by: EMERGENCY MEDICINE

## 2024-11-05 PROCEDURE — 85730 THROMBOPLASTIN TIME PARTIAL: CPT

## 2024-11-05 PROCEDURE — 96360 HYDRATION IV INFUSION INIT: CPT

## 2024-11-05 PROCEDURE — 80048 BASIC METABOLIC PNL TOTAL CA: CPT

## 2024-11-05 PROCEDURE — 84100 ASSAY OF PHOSPHORUS: CPT

## 2024-11-05 PROCEDURE — 70450 CT HEAD/BRAIN W/O DYE: CPT

## 2024-11-05 PROCEDURE — 96372 THER/PROPH/DIAG INJ SC/IM: CPT

## 2024-11-05 PROCEDURE — 81001 URINALYSIS AUTO W/SCOPE: CPT

## 2024-11-05 PROCEDURE — 85610 PROTHROMBIN TIME: CPT

## 2024-11-05 PROCEDURE — 82947 ASSAY GLUCOSE BLOOD QUANT: CPT

## 2024-11-05 PROCEDURE — 71045 X-RAY EXAM CHEST 1 VIEW: CPT

## 2024-11-05 RX ORDER — POTASSIUM CHLORIDE 1500 MG/1
40 TABLET, EXTENDED RELEASE ORAL ONCE
Status: COMPLETED | OUTPATIENT
Start: 2024-11-05 | End: 2024-11-05

## 2024-11-05 RX ORDER — 0.9 % SODIUM CHLORIDE 0.9 %
1000 INTRAVENOUS SOLUTION INTRAVENOUS ONCE
Status: COMPLETED | OUTPATIENT
Start: 2024-11-05 | End: 2024-11-05

## 2024-11-05 RX ADMIN — POTASSIUM CHLORIDE 40 MEQ: 1500 TABLET, EXTENDED RELEASE ORAL at 01:33

## 2024-11-05 RX ADMIN — INSULIN HUMAN 10 UNITS: 100 INJECTION, SOLUTION PARENTERAL at 01:32

## 2024-11-05 RX ADMIN — SODIUM CHLORIDE 1000 ML: 0.9 INJECTION, SOLUTION INTRAVENOUS at 01:43

## 2024-11-05 ASSESSMENT — ENCOUNTER SYMPTOMS
VOMITING: 0
DIARRHEA: 0
COUGH: 0
ABDOMINAL PAIN: 0
PHOTOPHOBIA: 0
SHORTNESS OF BREATH: 0
COLOR CHANGE: 0
TROUBLE SWALLOWING: 0
NAUSEA: 0

## 2024-11-05 ASSESSMENT — LIFESTYLE VARIABLES
HOW MANY STANDARD DRINKS CONTAINING ALCOHOL DO YOU HAVE ON A TYPICAL DAY: PATIENT DOES NOT DRINK
HOW OFTEN DO YOU HAVE A DRINK CONTAINING ALCOHOL: NEVER

## 2024-11-05 ASSESSMENT — HEART SCORE: ECG: NORMAL

## 2024-11-05 NOTE — ED PROVIDER NOTES
EMERGENCY DEPARTMENT ENCOUNTER    Pt Name: Ryder Orellana  MRN: [id number]  Birthdate 1980  Date of evaluation: 11/5/24  CHIEF COMPLAINT       Chief Complaint   Patient presents with    Dizziness     Dizziness on and off all day today and his legs felt like they were tired. Slight headache.      HISTORY OF PRESENT ILLNESS   44-year-old male presenting to the ER complaining of dizziness and fatigue that started earlier on Monday.  The patient does admit to an underlying history of hypertension, hyperlipidemia as well as diabetes.    The history is provided by the patient.   Dizziness  Quality:  Lightheadedness  Severity:  Moderate  Associated symptoms: no chest pain, no diarrhea, no nausea, no palpitations, no shortness of breath and no vomiting            REVIEW OF SYSTEMS     Review of Systems   Constitutional:  Positive for fatigue. Negative for activity change and fever.   HENT:  Negative for congestion, ear pain and trouble swallowing.    Eyes:  Negative for photophobia and visual disturbance.   Respiratory:  Negative for cough and shortness of breath.    Cardiovascular:  Negative for chest pain and palpitations.   Gastrointestinal:  Negative for abdominal pain, diarrhea, nausea and vomiting.   Genitourinary:  Negative for dysuria, flank pain and urgency.   Musculoskeletal:  Negative for arthralgias and myalgias.   Skin:  Negative for color change and rash.   Neurological:  Positive for dizziness. Negative for facial asymmetry.   Psychiatric/Behavioral:  Negative for agitation and behavioral problems.      PASTMEDICAL HISTORY     Past Medical History:   Diagnosis Date    Diabetes mellitus ([facility])     Erectile dysfunction 8/15/2017    Hypertension     Morbid obesity 4/28/2016    Type 2 diabetes mellitus without complication ([facility]) 4/28/2016     Past Problem List  Patient Active Problem List   Diagnosis Code    Type 2 diabetes mellitus without complication, with long-term current use of insulin ([facility]) E11.9, Z79.4

## 2024-11-06 LAB
EKG ATRIAL RATE: 95 BPM
EKG P AXIS: 47 DEGREES
EKG P-R INTERVAL: 162 MS
EKG Q-T INTERVAL: 356 MS
EKG QRS DURATION: 108 MS
EKG QTC CALCULATION (BAZETT): 447 MS
EKG R AXIS: 22 DEGREES
EKG T AXIS: 30 DEGREES
EKG VENTRICULAR RATE: 95 BPM
